# Patient Record
Sex: MALE | Employment: UNEMPLOYED | URBAN - METROPOLITAN AREA
[De-identification: names, ages, dates, MRNs, and addresses within clinical notes are randomized per-mention and may not be internally consistent; named-entity substitution may affect disease eponyms.]

---

## 2018-08-17 ENCOUNTER — TELEPHONE (OUTPATIENT)
Dept: INFECTIOUS DISEASES | Age: 53
End: 2018-08-17

## 2018-08-17 NOTE — TELEPHONE ENCOUNTER
Eli Angelo MD     8/17/2018 11:24 AM   Dr Jesus Mcpherson, I received a fax from [facility] on Giovanna Dakins ([birth year]) reading: End of treatment 8/18/18. Patient will be done with cefepime and daptomycin on 8/18. Is SN able to remove patient picc line? Dr Anne-Marie White recommendations on dictation from 7/20/18 in the hospital read: Wound culture of the right foot VRE  Wound culture positive for staph aureus and gram-negative prasanth  Continue daptomycin and cefepime until August 23  Check WBC, platelets, creatinine, LFTs, CK weekly while on antibiotic  Follow-up ID in 2 weeks. Patient scheduled to see you in the office on 9/11/18. Please advise. Thank you, Naila  Read 8/17/2018 12:01 PM     8/17/2018 12:02 PM   Yes pull picc after end date    *I spoke with Callie Strickland at [facility] and informed her of Dr Randie Gilford order. *

## 2018-09-07 PROBLEM — M00.9 SEPTIC ARTHRITIS (HCC): Status: ACTIVE | Noted: 2017-08-16

## 2018-09-07 PROBLEM — R13.10 DYSPHAGIA: Status: ACTIVE | Noted: 2018-03-02

## 2018-09-07 PROBLEM — R41.82 ALTERED MENTAL STATUS: Status: ACTIVE | Noted: 2017-08-24

## 2018-09-07 PROBLEM — E11.10 DKA (DIABETIC KETOACIDOSES): Status: ACTIVE | Noted: 2017-07-31

## 2018-09-07 PROBLEM — M79.89 HAND SWELLING: Status: ACTIVE | Noted: 2017-08-01

## 2018-09-07 PROBLEM — M86.172 ACUTE OSTEOMYELITIS OF TOE OF LEFT FOOT (HCC): Status: ACTIVE | Noted: 2017-08-08

## 2018-09-07 PROBLEM — L08.9 FOOT INFECTION: Status: ACTIVE | Noted: 2018-03-01

## 2018-09-07 PROBLEM — M24.571 EQUINUS CONTRACTURE OF RIGHT ANKLE: Status: ACTIVE | Noted: 2018-07-09

## 2018-09-07 PROBLEM — E66.01 SEVERE OBESITY (BMI 35.0-39.9): Status: ACTIVE | Noted: 2017-08-01

## 2018-09-07 PROBLEM — M00.232: Status: ACTIVE | Noted: 2017-08-08

## 2018-09-07 PROBLEM — M86.9 OSTEOMYELITIS OF FOOT (HCC): Status: ACTIVE | Noted: 2018-03-01

## 2018-09-07 PROBLEM — L97.529 SKIN ULCERS OF FOOT, BILATERAL (HCC): Status: ACTIVE | Noted: 2017-08-01

## 2018-09-07 PROBLEM — M14.671 CHARCOT'S JOINT OF RIGHT FOOT: Status: ACTIVE | Noted: 2018-03-01

## 2018-09-07 PROBLEM — L03.119 CELLULITIS AND ABSCESS OF FOOT: Status: ACTIVE | Noted: 2018-03-07

## 2018-09-07 PROBLEM — T84.84XA PAINFUL ORTHOPAEDIC HARDWARE (HCC): Status: ACTIVE | Noted: 2018-09-07

## 2018-09-07 PROBLEM — N50.89 SCROTUM SWELLING: Status: ACTIVE | Noted: 2018-09-06

## 2018-09-07 PROBLEM — N17.9 AKI (ACUTE KIDNEY INJURY) (HCC): Status: ACTIVE | Noted: 2017-08-01

## 2018-09-07 PROBLEM — L02.619 CELLULITIS AND ABSCESS OF FOOT: Status: ACTIVE | Noted: 2018-03-07

## 2018-09-07 PROBLEM — L97.519 SKIN ULCERS OF FOOT, BILATERAL (HCC): Status: ACTIVE | Noted: 2017-08-01

## 2018-09-07 PROBLEM — M00.9 SEPTIC ARTHRITIS OF WRIST, LEFT (HCC): Status: ACTIVE | Noted: 2017-08-02

## 2018-09-07 PROBLEM — L03.115 CELLULITIS OF RIGHT FOOT: Status: ACTIVE | Noted: 2018-07-09

## 2018-09-07 PROBLEM — D72.9 NEUTROPHILIC LEUKOCYTOSIS: Status: ACTIVE | Noted: 2017-08-08

## 2018-09-07 PROBLEM — T38.0X5A STEROID-INDUCED HYPERGLYCEMIA: Status: ACTIVE | Noted: 2018-03-11

## 2018-09-07 PROBLEM — A49.02 MRSA INFECTION: Status: ACTIVE | Noted: 2017-08-08

## 2018-09-07 PROBLEM — D57.3 SICKLE-CELL TRAIT (HCC): Status: ACTIVE | Noted: 2018-09-07

## 2018-09-07 PROBLEM — R73.9 STEROID-INDUCED HYPERGLYCEMIA: Status: ACTIVE | Noted: 2018-03-11

## 2018-09-07 PROBLEM — D72.829 LEUKOCYTOSIS: Status: ACTIVE | Noted: 2017-08-01

## 2018-10-06 ENCOUNTER — APPOINTMENT (OUTPATIENT)
Dept: CT IMAGING | Age: 53
End: 2018-10-06
Payer: COMMERCIAL

## 2018-10-06 ENCOUNTER — HOSPITAL ENCOUNTER (EMERGENCY)
Age: 53
Discharge: HOME | End: 2018-10-07
Attending: EMERGENCY MEDICINE
Payer: COMMERCIAL

## 2018-10-06 DIAGNOSIS — R10.13 ABDOMINAL PAIN, EPIGASTRIC: Primary | ICD-10-CM

## 2018-10-06 LAB
ABSOLUTE EOS #: 0 K/UL (ref 0–0.4)
ABSOLUTE IMMATURE GRANULOCYTE: ABNORMAL K/UL (ref 0–0.3)
ABSOLUTE LYMPH #: 0.8 K/UL (ref 1–4.8)
ABSOLUTE MONO #: 0.6 K/UL (ref 0.2–0.8)
ALBUMIN SERPL-MCNC: 3.4 G/DL (ref 3.5–5.2)
ALBUMIN/GLOBULIN RATIO: ABNORMAL (ref 1–2.5)
ALP BLD-CCNC: 106 U/L (ref 40–129)
ALT SERPL-CCNC: 13 U/L (ref 5–41)
AMYLASE: 66 U/L (ref 28–100)
ANION GAP SERPL CALCULATED.3IONS-SCNC: 21 MMOL/L (ref 9–17)
AST SERPL-CCNC: 36 U/L
BASOPHILS # BLD: 0 % (ref 0–2)
BASOPHILS ABSOLUTE: 0 K/UL (ref 0–0.2)
BILIRUB SERPL-MCNC: 1.19 MG/DL (ref 0.3–1.2)
BILIRUBIN DIRECT: 0.28 MG/DL
BILIRUBIN, INDIRECT: 0.91 MG/DL (ref 0–1)
BUN BLDV-MCNC: 24 MG/DL (ref 6–20)
BUN/CREAT BLD: 19 (ref 9–20)
CALCIUM SERPL-MCNC: 9.8 MG/DL (ref 8.6–10.4)
CHLORIDE BLD-SCNC: 92 MMOL/L (ref 98–107)
CO2: 24 MMOL/L (ref 20–31)
CREAT SERPL-MCNC: 1.29 MG/DL (ref 0.7–1.2)
DIFFERENTIAL TYPE: ABNORMAL
EOSINOPHILS RELATIVE PERCENT: 0 % (ref 1–4)
GFR AFRICAN AMERICAN: >60 ML/MIN
GFR NON-AFRICAN AMERICAN: 58 ML/MIN
GFR SERPL CREATININE-BSD FRML MDRD: ABNORMAL ML/MIN/{1.73_M2}
GFR SERPL CREATININE-BSD FRML MDRD: ABNORMAL ML/MIN/{1.73_M2}
GLOBULIN: ABNORMAL G/DL (ref 1.5–3.8)
GLUCOSE BLD-MCNC: 453 MG/DL (ref 70–99)
HCT VFR BLD CALC: 37.4 % (ref 41–53)
HEMOGLOBIN: 12.1 G/DL (ref 13.5–17.5)
IMMATURE GRANULOCYTES: ABNORMAL %
LIPASE: 10 U/L (ref 13–60)
LYMPHOCYTES # BLD: 11 % (ref 24–44)
MCH RBC QN AUTO: 25.6 PG (ref 26–34)
MCHC RBC AUTO-ENTMCNC: 32.3 G/DL (ref 31–37)
MCV RBC AUTO: 79.1 FL (ref 80–100)
MONOCYTES # BLD: 7 % (ref 1–7)
NRBC AUTOMATED: ABNORMAL PER 100 WBC
PDW BLD-RTO: 17.8 % (ref 11.5–14.5)
PLATELET # BLD: 326 K/UL (ref 130–400)
PLATELET ESTIMATE: ABNORMAL
PMV BLD AUTO: 8.3 FL (ref 6–12)
POTASSIUM SERPL-SCNC: 4.7 MMOL/L (ref 3.7–5.3)
RBC # BLD: 4.72 M/UL (ref 4.5–5.9)
RBC # BLD: ABNORMAL 10*6/UL
SEG NEUTROPHILS: 82 % (ref 36–66)
SEGMENTED NEUTROPHILS ABSOLUTE COUNT: 6.4 K/UL (ref 1.8–7.7)
SODIUM BLD-SCNC: 137 MMOL/L (ref 135–144)
TOTAL PROTEIN: 7.7 G/DL (ref 6.4–8.3)
WBC # BLD: 7.9 K/UL (ref 3.5–11)
WBC # BLD: ABNORMAL 10*3/UL

## 2018-10-06 PROCEDURE — 6360000002 HC RX W HCPCS: Performed by: NURSE PRACTITIONER

## 2018-10-06 PROCEDURE — 83690 ASSAY OF LIPASE: CPT

## 2018-10-06 PROCEDURE — 80076 HEPATIC FUNCTION PANEL: CPT

## 2018-10-06 PROCEDURE — 85025 COMPLETE CBC W/AUTO DIFF WBC: CPT

## 2018-10-06 PROCEDURE — 6370000000 HC RX 637 (ALT 250 FOR IP): Performed by: NURSE PRACTITIONER

## 2018-10-06 PROCEDURE — 99284 EMERGENCY DEPT VISIT MOD MDM: CPT

## 2018-10-06 PROCEDURE — 2580000003 HC RX 258: Performed by: NURSE PRACTITIONER

## 2018-10-06 PROCEDURE — 96375 TX/PRO/DX INJ NEW DRUG ADDON: CPT

## 2018-10-06 PROCEDURE — 74176 CT ABD & PELVIS W/O CONTRAST: CPT

## 2018-10-06 PROCEDURE — 82150 ASSAY OF AMYLASE: CPT

## 2018-10-06 PROCEDURE — 80048 BASIC METABOLIC PNL TOTAL CA: CPT

## 2018-10-06 PROCEDURE — 81003 URINALYSIS AUTO W/O SCOPE: CPT

## 2018-10-06 PROCEDURE — 96374 THER/PROPH/DIAG INJ IV PUSH: CPT

## 2018-10-06 RX ORDER — MORPHINE SULFATE 4 MG/ML
4 INJECTION, SOLUTION INTRAMUSCULAR; INTRAVENOUS ONCE
Status: COMPLETED | OUTPATIENT
Start: 2018-10-06 | End: 2018-10-06

## 2018-10-06 RX ORDER — SODIUM CHLORIDE 9 MG/ML
INJECTION, SOLUTION INTRAVENOUS CONTINUOUS
Status: DISCONTINUED | OUTPATIENT
Start: 2018-10-06 | End: 2018-10-07 | Stop reason: HOSPADM

## 2018-10-06 RX ORDER — CARVEDILOL 25 MG/1
25 TABLET ORAL 2 TIMES DAILY WITH MEALS
COMMUNITY

## 2018-10-06 RX ORDER — ATORVASTATIN CALCIUM 10 MG/1
TABLET, FILM COATED ORAL DAILY
Status: ON HOLD | COMMUNITY
End: 2018-10-24 | Stop reason: HOSPADM

## 2018-10-06 RX ORDER — INSULIN GLARGINE 100 [IU]/ML
40 INJECTION, SOLUTION SUBCUTANEOUS 2 TIMES DAILY
Status: ON HOLD | COMMUNITY
End: 2018-10-31 | Stop reason: HOSPADM

## 2018-10-06 RX ORDER — PANTOPRAZOLE SODIUM 20 MG/1
20 TABLET, DELAYED RELEASE ORAL DAILY
COMMUNITY

## 2018-10-06 RX ORDER — AMLODIPINE BESYLATE 10 MG/1
10 TABLET ORAL DAILY
Status: ON HOLD | COMMUNITY
End: 2018-10-24 | Stop reason: HOSPADM

## 2018-10-06 RX ORDER — ONDANSETRON 2 MG/ML
4 INJECTION INTRAMUSCULAR; INTRAVENOUS ONCE
Status: COMPLETED | OUTPATIENT
Start: 2018-10-06 | End: 2018-10-06

## 2018-10-06 RX ADMIN — INSULIN HUMAN 7 UNITS: 100 INJECTION, SOLUTION PARENTERAL at 23:24

## 2018-10-06 RX ADMIN — SODIUM CHLORIDE: 9 INJECTION, SOLUTION INTRAVENOUS at 22:17

## 2018-10-06 RX ADMIN — MORPHINE SULFATE 4 MG: 4 INJECTION INTRAVENOUS at 22:17

## 2018-10-06 RX ADMIN — ONDANSETRON 4 MG: 2 INJECTION INTRAMUSCULAR; INTRAVENOUS at 22:17

## 2018-10-06 ASSESSMENT — PAIN SCALES - GENERAL
PAINLEVEL_OUTOF10: 10
PAINLEVEL_OUTOF10: 7
PAINLEVEL_OUTOF10: 10

## 2018-10-07 VITALS
DIASTOLIC BLOOD PRESSURE: 95 MMHG | BODY MASS INDEX: 38.36 KG/M2 | HEIGHT: 76 IN | SYSTOLIC BLOOD PRESSURE: 148 MMHG | WEIGHT: 315 LBS | TEMPERATURE: 98.1 F | HEART RATE: 101 BPM | RESPIRATION RATE: 16 BRPM | OXYGEN SATURATION: 97 %

## 2018-10-07 LAB — GLUCOSE BLD-MCNC: 384 MG/DL (ref 75–110)

## 2018-10-07 PROCEDURE — 82947 ASSAY GLUCOSE BLOOD QUANT: CPT

## 2018-10-07 PROCEDURE — 96375 TX/PRO/DX INJ NEW DRUG ADDON: CPT

## 2018-10-07 PROCEDURE — 6360000002 HC RX W HCPCS: Performed by: NURSE PRACTITIONER

## 2018-10-07 RX ORDER — ONDANSETRON 4 MG/1
4 TABLET, ORALLY DISINTEGRATING ORAL EVERY 8 HOURS PRN
Qty: 20 TABLET | Refills: 0 | Status: SHIPPED | OUTPATIENT
Start: 2018-10-07

## 2018-10-07 RX ORDER — ONDANSETRON 2 MG/ML
4 INJECTION INTRAMUSCULAR; INTRAVENOUS ONCE
Status: COMPLETED | OUTPATIENT
Start: 2018-10-07 | End: 2018-10-07

## 2018-10-07 RX ORDER — SUCRALFATE 1 G/1
1 TABLET ORAL 4 TIMES DAILY
Qty: 30 TABLET | Refills: 0 | Status: ON HOLD | OUTPATIENT
Start: 2018-10-07 | End: 2018-10-31 | Stop reason: HOSPADM

## 2018-10-07 RX ADMIN — ONDANSETRON 4 MG: 2 INJECTION INTRAMUSCULAR; INTRAVENOUS at 00:50

## 2018-10-07 ASSESSMENT — ENCOUNTER SYMPTOMS
RHINORRHEA: 0
SORE THROAT: 0
COLOR CHANGE: 0
WHEEZING: 0
COUGH: 0
SINUS PRESSURE: 0
CONSTIPATION: 0
DIARRHEA: 0
SHORTNESS OF BREATH: 0
ABDOMINAL PAIN: 1
NAUSEA: 1
VOMITING: 1

## 2018-10-07 NOTE — ED PROVIDER NOTES
[address] [place] [address] ED  eMERGENCY dEPARTMENT eNCOUnter      Pt Name: Noa Renae  MRN: [id number]  [place] 1965  Date of evaluation: 10/6/2018  Provider: [address] [place] [address] NP, OLIVER Mcmullen 6663       Chief Complaint   Patient presents with    Abdominal Pain     hx of pancreatitis         HISTORY OF PRESENT ILLNESS  (Location/Symptom, Timing/Onset, Context/Setting, Quality, Duration, Modifying Factors, Severity.)   Noa Renae is a 45 y.o. male who presents to the emergency department By private vehicle for evaluation of abdominal pain. Patient states he has a history of epigastric abdominal pain. He denies any associated fevers or chills. He states he's also had nausea and vomiting that started yesterday. Patient states that this feels similar. He denies any diarrhea or constipation. Nursing Notes were reviewed.     ALLERGIES     Nsaids    CURRENT MEDICATIONS       Discharge Medication List as of 10/7/2018 12:33 AM      CONTINUE these medications which have NOT CHANGED    Details   carvedilol (COREG) 25 MG tablet Take 25 mg by mouth 2 times daily (with meals)Historical Med      amLODIPine (NORVASC) 10 MG tablet Take 10 mg by mouth dailyHistorical Med      pantoprazole (PROTONIX) 20 MG tablet Take 20 mg by mouth dailyHistorical Med      atorvastatin (LIPITOR) 10 MG tablet Take by mouth dailyHistorical Med      insulin glargine (LANTUS) 100 UNIT/ML injection vial Inject into the skin nightlyHistorical Med      Insulin Zinc Human (HUMULIN L SC) Inject into the skinHistorical Med      aspirin 81 MG tablet Take 81 mg by mouth dailyHistorical Med             PAST MEDICAL HISTORY         Diagnosis Date    Dysphagia 3/2/2018    Overview:  Added automatically from request for surgery [id number]  Overview:  Overview:  Added automatically from request for surgery [id number]    Equinus contracture of right ankle 7/9/2018    Foot infection 3/1/2018    Hand swelling 8/1/2017    HTN (hypertension) 12/27/2012    Hyperglycemia without ketosis 12/27/2012    Hyperkalemia 7/20/2012    Leukocytosis 8/1/2017    Mixed hyperlipidemia 12/27/2012    MRSA infection 8/8/2017    Nausea & vomiting 35/34/5984    Neutrophilic leukocytosis 8/5/6123    Osteomyelitis ([address] 7/20/2012    Osteomyelitis of foot ([address] 3/1/2018    Overview:  Added automatically from request for surgery [id number]  Overview:  Overview:  Added automatically from request for surgery [id number]    Painful orthopaedic hardware ([address] 9/7/2018    Scrotum swelling 9/6/2018    Septic arthritis ([address] 8/16/2017    Overview:  Added automatically from request for surgery [id number]  Overview:  Overview:  Added automatically from request for surgery [id number]    Septic arthritis of wrist, left ([address] 8/2/2017    Overview:  Added automatically from request for surgery [id number]  Overview:  Overview:  Added automatically from request for surgery [id number]    Sickle-cell trait ([address] 9/7/2018    Skin ulcers of foot, bilateral ([address] 8/1/2017    Steroid-induced hyperglycemia 3/11/2018    Streptococcal arthritis of left wrist ([address] 8/8/2017    Type 2 diabetes mellitus, uncontrolled ([address] 7/20/2012    Uncontrolled type 2 diabetes mellitus with diabetic nephropathy, with long-term current use of insulin ([address] 3/9/2018    Uncontrolled type 2 diabetes mellitus with retinopathy, with long-term current use of insulin ([address] 3/9/2018       SURGICAL HISTORY           Procedure Laterality Date    AMPUTATION      left foot, 3rd toe    AMPUTATION      right foot, 2nd toe         FAMILY HISTORY     History reviewed. No pertinent family history. No family status information on file. SOCIAL HISTORY      reports that he has quit smoking. He has never used smokeless tobacco. He reports that he does not drink alcohol or use drugs.     REVIEW OF SYSTEMS    (2-9 systems for level 4, 10 or more for level 5)     Review of Systems   Constitutional: Negative for chills, fever and injection 4 mg (4 mg Intravenous Given 10/6/18 3455)   insulin regular (HUMULIN R;NOVOLIN R) injection 7 Units (7 Units Intravenous Given 10/6/18 9776)   ondansetron (ZOFRAN) injection 4 mg (4 mg Intravenous Given 10/7/18 0050)     FINAL IMPRESSION      1.  Abdominal pain, epigastric          DISPOSITION/PLAN   DISPOSITION Decision To Discharge 10/07/2018 12:32:31 AM      PATIENT REFERRED TO:   Monique Traore MD  [address] [facility]  [address] [facility]  [address]  [phone]    Call in 2 days      [facility] ED  [address] [facility]  [phone]    If symptoms worsen      DISCHARGE MEDICATIONS:     Discharge Medication List as of 10/7/2018 12:33 AM      START taking these medications    Details   sucralfate (CARAFATE) 1 GM tablet Take 1 tablet by mouth 4 times daily, Disp-30 tablet, R-0Print      ondansetron (ZOFRAN ODT) 4 MG disintegrating tablet Take 1 tablet by mouth every 8 hours as needed for Nausea, Disp-20 tablet, R-0Print                 (Please note that portions of this note were completed with a voice recognition program.  Efforts were made to edit the dictations but occasionally words are mis-transcribed.)    Destiny Rico NP, APRN - CNP  Certified Nurse Practitioner            OLIVER Madrid CNP  10/07/18 5631

## 2018-10-07 NOTE — ED NOTES
Pt presents with c/o abdominal pain. Pt states he has history of pancreatitis. Also experiencing nausea and vomiting. S/sx started today. Rates pain 10/10.      Lesley Habermann, RN  10/06/18 0461

## 2018-10-12 ENCOUNTER — OFFICE VISIT (OUTPATIENT)
Dept: INTERNAL MEDICINE | Age: 53
End: 2018-10-12
Payer: COMMERCIAL

## 2018-10-12 VITALS — DIASTOLIC BLOOD PRESSURE: 90 MMHG | OXYGEN SATURATION: 91 % | HEART RATE: 91 BPM | SYSTOLIC BLOOD PRESSURE: 145 MMHG

## 2018-10-12 DIAGNOSIS — D86.9 SARCOIDOSIS: ICD-10-CM

## 2018-10-12 DIAGNOSIS — Z23 NEED FOR PNEUMOCOCCAL VACCINATION: ICD-10-CM

## 2018-10-12 DIAGNOSIS — Z13.220 SCREENING FOR HYPERLIPIDEMIA: ICD-10-CM

## 2018-10-12 DIAGNOSIS — Z23 FLU VACCINE NEED: ICD-10-CM

## 2018-10-12 DIAGNOSIS — Z12.11 SCREENING FOR COLON CANCER: ICD-10-CM

## 2018-10-12 DIAGNOSIS — M06.9 RHEUMATOID ARTHRITIS, INVOLVING UNSPECIFIED SITE, UNSPECIFIED RHEUMATOID FACTOR PRESENCE: ICD-10-CM

## 2018-10-12 DIAGNOSIS — Z79.4 TYPE 2 DIABETES MELLITUS WITH DIABETIC PERIPHERAL ANGIOPATHY WITHOUT GANGRENE, WITH LONG-TERM CURRENT USE OF INSULIN (HCC): ICD-10-CM

## 2018-10-12 DIAGNOSIS — E11.51 TYPE 2 DIABETES MELLITUS WITH DIABETIC PERIPHERAL ANGIOPATHY WITHOUT GANGRENE, WITH LONG-TERM CURRENT USE OF INSULIN (HCC): ICD-10-CM

## 2018-10-12 LAB — HBA1C MFR BLD: 11.1 %

## 2018-10-12 PROCEDURE — 3046F HEMOGLOBIN A1C LEVEL >9.0%: CPT | Performed by: INTERNAL MEDICINE

## 2018-10-12 PROCEDURE — G8482 FLU IMMUNIZE ORDER/ADMIN: HCPCS | Performed by: INTERNAL MEDICINE

## 2018-10-12 PROCEDURE — 3017F COLORECTAL CA SCREEN DOC REV: CPT | Performed by: INTERNAL MEDICINE

## 2018-10-12 PROCEDURE — G8427 DOCREV CUR MEDS BY ELIG CLIN: HCPCS | Performed by: INTERNAL MEDICINE

## 2018-10-12 PROCEDURE — 90472 IMMUNIZATION ADMIN EACH ADD: CPT | Performed by: INTERNAL MEDICINE

## 2018-10-12 PROCEDURE — 90670 PCV13 VACCINE IM: CPT | Performed by: INTERNAL MEDICINE

## 2018-10-12 PROCEDURE — 2022F DILAT RTA XM EVC RTNOPTHY: CPT | Performed by: INTERNAL MEDICINE

## 2018-10-12 PROCEDURE — 90471 IMMUNIZATION ADMIN: CPT | Performed by: INTERNAL MEDICINE

## 2018-10-12 PROCEDURE — 1036F TOBACCO NON-USER: CPT | Performed by: INTERNAL MEDICINE

## 2018-10-12 PROCEDURE — 83036 HEMOGLOBIN GLYCOSYLATED A1C: CPT | Performed by: INTERNAL MEDICINE

## 2018-10-12 PROCEDURE — 99203 OFFICE O/P NEW LOW 30 MIN: CPT | Performed by: INTERNAL MEDICINE

## 2018-10-12 PROCEDURE — G8417 CALC BMI ABV UP PARAM F/U: HCPCS | Performed by: INTERNAL MEDICINE

## 2018-10-12 PROCEDURE — 90688 IIV4 VACCINE SPLT 0.5 ML IM: CPT | Performed by: INTERNAL MEDICINE

## 2018-10-12 RX ORDER — AMMONIUM LACTATE 12 G/100G
CREAM TOPICAL
COMMUNITY

## 2018-10-12 RX ORDER — DOCUSATE SODIUM 100 MG/1
100 CAPSULE, LIQUID FILLED ORAL 2 TIMES DAILY
COMMUNITY

## 2018-10-12 RX ORDER — FERROUS SULFATE 325(65) MG
325 TABLET ORAL 2 TIMES DAILY WITH MEALS
COMMUNITY

## 2018-10-12 RX ORDER — PREGABALIN 50 MG/1
50 CAPSULE ORAL 2 TIMES DAILY
COMMUNITY

## 2018-10-12 RX ORDER — HYDROCHLOROTHIAZIDE 25 MG/1
25 TABLET ORAL
Status: ON HOLD | COMMUNITY
Start: 2018-09-20 | End: 2018-10-24 | Stop reason: HOSPADM

## 2018-10-12 ASSESSMENT — ENCOUNTER SYMPTOMS
RESPIRATORY NEGATIVE: 1
NAUSEA: 1
VOMITING: 1
EYES NEGATIVE: 1
ALLERGIC/IMMUNOLOGIC NEGATIVE: 1

## 2018-10-12 ASSESSMENT — PATIENT HEALTH QUESTIONNAIRE - PHQ9
SUM OF ALL RESPONSES TO PHQ QUESTIONS 1-9: 0
SUM OF ALL RESPONSES TO PHQ QUESTIONS 1-9: 0
SUM OF ALL RESPONSES TO PHQ9 QUESTIONS 1 & 2: 0
1. LITTLE INTEREST OR PLEASURE IN DOING THINGS: 0
2. FEELING DOWN, DEPRESSED OR HOPELESS: 0

## 2018-10-12 NOTE — PROGRESS NOTES
of insulin ([address]   . Past Medical History:   Diagnosis Date    Dysphagia 3/2/2018    Overview:  Added automatically from request for surgery [id number]  Overview:  Overview:  Added automatically from request for surgery [id number]    Equinus contracture of right ankle 7/9/2018    Foot infection 3/1/2018    Hand swelling 8/1/2017    HTN (hypertension) 12/27/2012    Hyperglycemia without ketosis 12/27/2012    Hyperkalemia 7/20/2012    Leukocytosis 8/1/2017    Mixed hyperlipidemia 12/27/2012    MRSA infection 8/8/2017    Nausea & vomiting 17/83/0650    Neutrophilic leukocytosis 9/2/9910    Osteomyelitis ([address] 7/20/2012    Osteomyelitis of foot ([address] 3/1/2018    Overview:  Added automatically from request for surgery [id number]  Overview:  Overview:  Added automatically from request for surgery [id number]    Painful orthopaedic hardware ([address] 9/7/2018    Scrotum swelling 9/6/2018    Septic arthritis ([address] 8/16/2017    Overview:  Added automatically from request for surgery [id number]  Overview:  Overview:  Added automatically from request for surgery [id number]    Septic arthritis of wrist, left ([address] 8/2/2017    Overview:  Added automatically from request for surgery [id number]  Overview:  Overview:  Added automatically from request for surgery [id number]    Sickle-cell trait ([address] 9/7/2018    Skin ulcers of foot, bilateral ([address] 8/1/2017    Steroid-induced hyperglycemia 3/11/2018    Streptococcal arthritis of left wrist ([address] 8/8/2017    Type 2 diabetes mellitus, uncontrolled ([address] 7/20/2012    Uncontrolled type 2 diabetes mellitus with diabetic nephropathy, with long-term current use of insulin ([address] 3/9/2018    Uncontrolled type 2 diabetes mellitus with retinopathy, with long-term current use of insulin ([address] 3/9/2018       Past Surgical History:   Procedure Laterality Date    AMPUTATION      left foot, 3rd toe    AMPUTATION      right foot, 2nd toe       No family history on file.     Social History   Substance Use test (Diabetic or Prediabetic)  11/20/1973    Diabetic retinal exam  11/20/1973    Lipid screen  11/20/1973    HIV screen  11/20/1978    Diabetic microalbuminuria test  11/20/1981    DTaP/Tdap/Td vaccine (1 - Tdap) 11/20/1982    Colon cancer screen colonoscopy  11/20/2013    Pneumococcal highest risk (2 of 3 - PCV13) 12/29/2013    Flu vaccine (1) 09/01/2018    Potassium monitoring  10/06/2019    Creatinine monitoring  10/06/2019       Controlled Substances Monitoring:      HPI:     Diabetes   He presents for his initial diabetic visit. He has type 2 diabetes mellitus. His disease course has been stable. Symptoms are stable. Diabetic complications include nephropathy, peripheral neuropathy and PVD. Risk factors for coronary artery disease include diabetes mellitus, dyslipidemia, obesity and hypertension. Current diabetic treatment includes insulin injections. He is compliant with treatment most of the time. He is following a generally healthy diet. His home blood glucose trend is fluctuating minimally (A1C is 11.1). (Recently he says his BS run between 150 and 450.) An ACE inhibitor/angiotensin II receptor blocker is contraindicated. Multiple hospitalizations. Mainly for DM foot problems: Charcot foot, ulcers. Right foot. Has a podiatrist.    Has DM nephropathy not seeing a nephrologist.    He states he has been dx with both sarcoidosis and rheumatoid arthritis. Wants rheumatology referral.     ROS:     Review of Systems   Constitutional: Positive for appetite change. HENT: Negative. Eyes: Negative. Respiratory: Negative. Cardiovascular: Positive for leg swelling. Gastrointestinal: Positive for nausea and vomiting. Endocrine: Negative. Genitourinary: Negative. Musculoskeletal: Negative. Skin: Negative. Allergic/Immunologic: Negative. Neurological: Negative. Hematological: Negative. Psychiatric/Behavioral: Negative.     All other systems reviewed and are negative. Objective:     Physical Exam   Constitutional: He is oriented to person, place, and time. He appears well-developed and well-nourished. HENT:   Head: Normocephalic and atraumatic. Neck: Neck supple. Cardiovascular: Normal rate and regular rhythm. Pulmonary/Chest: Effort normal and breath sounds normal.   Abdominal: Soft. Musculoskeletal: He exhibits edema. Neurological: He is alert and oriented to person, place, and time. Skin: Skin is warm and dry. Right foot with decreased sensation and pulses. Has healing ulcerations. Psychiatric: He has a normal mood and affect. His behavior is normal.   Vitals reviewed. Visit Information    Have you changed or started any medications since your last visit including any over-the-counter medicines, vitamins, or herbal medicines? no   Are you having any side effects from any of your medications? -  no  Have you stopped taking any of your medications? Is so, why? -  no    Have you seen any other physician or provider since your last visit? Yes - Records Obtained  Have you had any other diagnostic tests since your last visit? Yes - Records Obtained  Have you been seen in the emergency room and/or had an admission to a hospital since we last saw you? Yes - Records Obtained  Have you had your routine dental cleaning in the past 6 months? no    Have you activated your [employer] account? If not, what are your barriers?  Yes     No care team member to display    Medical History Review  Past Medical, Family, and Social History reviewed and does contribute to the patient presenting condition    Health Maintenance   Topic Date Due    Hepatitis C screen  1965    Diabetic foot exam  11/20/1973    A1C test (Diabetic or Prediabetic)  11/20/1973    Diabetic retinal exam  11/20/1973    Lipid screen  11/20/1973    HIV screen  11/20/1978    Diabetic microalbuminuria test  11/20/1981    DTaP/Tdap/Td vaccine (1 - Tdap) 11/20/1982    Colon cancer

## 2018-10-14 ENCOUNTER — HOSPITAL ENCOUNTER (INPATIENT)
Age: 53
LOS: 9 days | Discharge: HOME | DRG: 174 | End: 2018-10-24
Attending: EMERGENCY MEDICINE | Admitting: INTERNAL MEDICINE
Payer: COMMERCIAL

## 2018-10-14 DIAGNOSIS — I50.42 CHRONIC COMBINED SYSTOLIC (CONGESTIVE) AND DIASTOLIC (CONGESTIVE) HEART FAILURE (HCC): ICD-10-CM

## 2018-10-14 DIAGNOSIS — I21.4 NSTEMI (NON-ST ELEVATED MYOCARDIAL INFARCTION) (HCC): Primary | ICD-10-CM

## 2018-10-14 DIAGNOSIS — I47.1 SVT (SUPRAVENTRICULAR TACHYCARDIA) (HCC): ICD-10-CM

## 2018-10-14 DIAGNOSIS — N17.9 AKI (ACUTE KIDNEY INJURY) (HCC): ICD-10-CM

## 2018-10-14 PROCEDURE — 2580000003 HC RX 258: Performed by: EMERGENCY MEDICINE

## 2018-10-14 PROCEDURE — 85014 HEMATOCRIT: CPT

## 2018-10-14 PROCEDURE — 85610 PROTHROMBIN TIME: CPT

## 2018-10-14 PROCEDURE — 87205 SMEAR GRAM STAIN: CPT

## 2018-10-14 PROCEDURE — 82435 ASSAY OF BLOOD CHLORIDE: CPT

## 2018-10-14 PROCEDURE — 85730 THROMBOPLASTIN TIME PARTIAL: CPT

## 2018-10-14 PROCEDURE — 85025 COMPLETE CBC W/AUTO DIFF WBC: CPT

## 2018-10-14 PROCEDURE — 93005 ELECTROCARDIOGRAM TRACING: CPT

## 2018-10-14 PROCEDURE — 87149 DNA/RNA DIRECT PROBE: CPT

## 2018-10-14 PROCEDURE — 82947 ASSAY GLUCOSE BLOOD QUANT: CPT

## 2018-10-14 PROCEDURE — 99291 CRITICAL CARE FIRST HOUR: CPT

## 2018-10-14 PROCEDURE — 83690 ASSAY OF LIPASE: CPT

## 2018-10-14 PROCEDURE — 86403 PARTICLE AGGLUT ANTBDY SCRN: CPT

## 2018-10-14 PROCEDURE — 84132 ASSAY OF SERUM POTASSIUM: CPT

## 2018-10-14 PROCEDURE — 84295 ASSAY OF SERUM SODIUM: CPT

## 2018-10-14 PROCEDURE — 87040 BLOOD CULTURE FOR BACTERIA: CPT

## 2018-10-14 PROCEDURE — 82565 ASSAY OF CREATININE: CPT

## 2018-10-14 PROCEDURE — 80053 COMPREHEN METABOLIC PANEL: CPT

## 2018-10-14 PROCEDURE — 82330 ASSAY OF CALCIUM: CPT

## 2018-10-14 PROCEDURE — 82803 BLOOD GASES ANY COMBINATION: CPT

## 2018-10-14 PROCEDURE — 83605 ASSAY OF LACTIC ACID: CPT

## 2018-10-14 RX ORDER — SODIUM CHLORIDE 0.9 % (FLUSH) 0.9 %
10 SYRINGE (ML) INJECTION PRN
Status: DISCONTINUED | OUTPATIENT
Start: 2018-10-14 | End: 2018-10-24 | Stop reason: HOSPADM

## 2018-10-14 RX ORDER — 0.9 % SODIUM CHLORIDE 0.9 %
1000 INTRAVENOUS SOLUTION INTRAVENOUS ONCE
Status: COMPLETED | OUTPATIENT
Start: 2018-10-15 | End: 2018-10-15

## 2018-10-14 RX ORDER — FENTANYL CITRATE 50 UG/ML
25 INJECTION, SOLUTION INTRAMUSCULAR; INTRAVENOUS ONCE
Status: DISCONTINUED | OUTPATIENT
Start: 2018-10-15 | End: 2018-10-14

## 2018-10-14 RX ORDER — FENTANYL CITRATE 50 UG/ML
50 INJECTION, SOLUTION INTRAMUSCULAR; INTRAVENOUS ONCE
Status: COMPLETED | OUTPATIENT
Start: 2018-10-15 | End: 2018-10-15

## 2018-10-14 RX ORDER — SODIUM CHLORIDE 0.9 % (FLUSH) 0.9 %
10 SYRINGE (ML) INJECTION EVERY 12 HOURS SCHEDULED
Status: DISCONTINUED | OUTPATIENT
Start: 2018-10-15 | End: 2018-10-24 | Stop reason: HOSPADM

## 2018-10-14 RX ORDER — ONDANSETRON 2 MG/ML
4 INJECTION INTRAMUSCULAR; INTRAVENOUS ONCE
Status: COMPLETED | OUTPATIENT
Start: 2018-10-15 | End: 2018-10-15

## 2018-10-14 RX ADMIN — SODIUM CHLORIDE 1000 ML: 9 INJECTION, SOLUTION INTRAVENOUS at 23:46

## 2018-10-14 RX ADMIN — SODIUM CHLORIDE 1000 ML: 9 INJECTION, SOLUTION INTRAVENOUS at 23:56

## 2018-10-15 ENCOUNTER — APPOINTMENT (OUTPATIENT)
Dept: CT IMAGING | Age: 53
DRG: 174 | End: 2018-10-15
Payer: COMMERCIAL

## 2018-10-15 ENCOUNTER — APPOINTMENT (OUTPATIENT)
Dept: NUCLEAR MEDICINE | Age: 53
DRG: 174 | End: 2018-10-15
Payer: COMMERCIAL

## 2018-10-15 ENCOUNTER — APPOINTMENT (OUTPATIENT)
Dept: GENERAL RADIOLOGY | Age: 53
DRG: 174 | End: 2018-10-15
Payer: COMMERCIAL

## 2018-10-15 PROBLEM — R77.8 ELEVATED TROPONIN: Status: ACTIVE | Noted: 2018-10-15

## 2018-10-15 PROBLEM — I24.9 ACUTE CORONARY SYNDROME (HCC): Status: ACTIVE | Noted: 2018-10-15

## 2018-10-15 PROBLEM — I47.1 SVT (SUPRAVENTRICULAR TACHYCARDIA) (HCC): Status: ACTIVE | Noted: 2018-10-15

## 2018-10-15 LAB
-: NORMAL
ABSOLUTE EOS #: 0.43 K/UL (ref 0–0.44)
ABSOLUTE IMMATURE GRANULOCYTE: 0.13 K/UL (ref 0–0.3)
ABSOLUTE LYMPH #: 2.15 K/UL (ref 1.1–3.7)
ABSOLUTE MONO #: 1.34 K/UL (ref 0.1–1.2)
ALBUMIN SERPL-MCNC: 2.9 G/DL (ref 3.5–5.2)
ALBUMIN/GLOBULIN RATIO: 0.7 (ref 1–2.5)
ALLEN TEST: ABNORMAL
ALP BLD-CCNC: 105 U/L (ref 40–129)
ALT SERPL-CCNC: 14 U/L (ref 5–41)
AMORPHOUS: NORMAL
ANION GAP SERPL CALCULATED.3IONS-SCNC: 13 MMOL/L (ref 9–17)
ANION GAP: 7 MMOL/L (ref 7–16)
AST SERPL-CCNC: 18 U/L
BACTERIA: NORMAL
BASOPHILS # BLD: 0 % (ref 0–2)
BASOPHILS ABSOLUTE: 0.03 K/UL (ref 0–0.2)
BILIRUB SERPL-MCNC: 0.58 MG/DL (ref 0.3–1.2)
BILIRUBIN URINE: NEGATIVE
BNP INTERPRETATION: ABNORMAL
BUN BLDV-MCNC: 42 MG/DL (ref 6–20)
BUN BLDV-MCNC: 42 MG/DL (ref 6–20)
BUN BLDV-MCNC: 44 MG/DL (ref 6–20)
BUN/CREAT BLD: ABNORMAL (ref 9–20)
CALCIUM SERPL-MCNC: 9.2 MG/DL (ref 8.6–10.4)
CASTS UA: NORMAL /LPF (ref 0–8)
CHLORIDE BLD-SCNC: 94 MMOL/L (ref 98–107)
CO2: 30 MMOL/L (ref 20–31)
COLOR: YELLOW
COMMENT UA: ABNORMAL
CREAT SERPL-MCNC: 1.68 MG/DL (ref 0.7–1.2)
CREAT SERPL-MCNC: 1.69 MG/DL (ref 0.7–1.2)
CREAT SERPL-MCNC: 1.97 MG/DL (ref 0.7–1.2)
CREATININE URINE: 68.5 MG/DL (ref 39–259)
CRYSTALS, UA: NORMAL /HPF
DIFFERENTIAL TYPE: ABNORMAL
EOSINOPHILS RELATIVE PERCENT: 4 % (ref 1–4)
EPITHELIAL CELLS UA: NORMAL /HPF (ref 0–5)
FIO2: ABNORMAL
GFR AFRICAN AMERICAN: 43 ML/MIN
GFR AFRICAN AMERICAN: 52 ML/MIN
GFR AFRICAN AMERICAN: 52 ML/MIN
GFR NON-AFRICAN AMERICAN: 34 ML/MIN
GFR NON-AFRICAN AMERICAN: 36 ML/MIN
GFR NON-AFRICAN AMERICAN: 43 ML/MIN
GFR NON-AFRICAN AMERICAN: 43 ML/MIN
GFR SERPL CREATININE-BSD FRML MDRD: 42 ML/MIN
GFR SERPL CREATININE-BSD FRML MDRD: ABNORMAL ML/MIN/{1.73_M2}
GLUCOSE BLD-MCNC: 356 MG/DL (ref 70–99)
GLUCOSE BLD-MCNC: 365 MG/DL (ref 74–100)
GLUCOSE BLD-MCNC: 399 MG/DL (ref 75–110)
GLUCOSE BLD-MCNC: 455 MG/DL (ref 75–110)
GLUCOSE BLD-MCNC: 473 MG/DL (ref 75–110)
GLUCOSE URINE: ABNORMAL
HCO3 VENOUS: 31.8 MMOL/L (ref 22–29)
HCT VFR BLD CALC: 37.5 % (ref 40.7–50.3)
HCT VFR BLD CALC: 42 % (ref 40.7–50.3)
HEMOGLOBIN: 11.7 G/DL (ref 13–17)
HEMOGLOBIN: 13.1 G/DL (ref 13–17)
IMMATURE GRANULOCYTES: 1 %
INR BLD: 1.1
KETONES, URINE: NEGATIVE
LACTIC ACID, SEPSIS WHOLE BLOOD: 3 MMOL/L (ref 0.5–1.9)
LACTIC ACID, SEPSIS: ABNORMAL MMOL/L (ref 0.5–1.9)
LACTIC ACID, WHOLE BLOOD: 2.8 MMOL/L (ref 0.7–2.1)
LEUKOCYTE ESTERASE, URINE: NEGATIVE
LIPASE: 13 U/L (ref 13–60)
LYMPHOCYTES # BLD: 19 % (ref 24–43)
MAGNESIUM: 1.8 MG/DL (ref 1.6–2.6)
MCH RBC QN AUTO: 24.6 PG (ref 25.2–33.5)
MCH RBC QN AUTO: 24.7 PG (ref 25.2–33.5)
MCHC RBC AUTO-ENTMCNC: 31.2 G/DL (ref 28.4–34.8)
MCHC RBC AUTO-ENTMCNC: 31.2 G/DL (ref 28.4–34.8)
MCV RBC AUTO: 78.9 FL (ref 82.6–102.9)
MCV RBC AUTO: 79.3 FL (ref 82.6–102.9)
MODE: ABNORMAL
MONOCYTES # BLD: 12 % (ref 3–12)
MUCUS: NORMAL
NEGATIVE BASE EXCESS, VEN: ABNORMAL (ref 0–2)
NITRITE, URINE: NEGATIVE
NRBC AUTOMATED: 0 PER 100 WBC
NRBC AUTOMATED: 0 PER 100 WBC
O2 DEVICE/FLOW/%: ABNORMAL
O2 SAT, VEN: 86 % (ref 60–85)
OTHER OBSERVATIONS UA: NORMAL
PARTIAL THROMBOPLASTIN TIME: 23 SEC (ref 20.5–30.5)
PARTIAL THROMBOPLASTIN TIME: 23.8 SEC (ref 20.5–30.5)
PARTIAL THROMBOPLASTIN TIME: 42.1 SEC (ref 20.5–30.5)
PATIENT TEMP: ABNORMAL
PCO2, VEN: 40.9 MM HG (ref 41–51)
PDW BLD-RTO: 17.1 % (ref 11.8–14.4)
PDW BLD-RTO: 17.1 % (ref 11.8–14.4)
PH UA: 5 (ref 5–8)
PH VENOUS: 7.5 (ref 7.32–7.43)
PHOSPHORUS: 3.6 MG/DL (ref 2.5–4.5)
PLATELET # BLD: 334 K/UL (ref 138–453)
PLATELET # BLD: 475 K/UL (ref 138–453)
PLATELET ESTIMATE: ABNORMAL
PMV BLD AUTO: 10.7 FL (ref 8.1–13.5)
PMV BLD AUTO: 10.8 FL (ref 8.1–13.5)
PO2, VEN: 47.1 MM HG (ref 30–50)
POC CHLORIDE: 97 MMOL/L (ref 98–107)
POC CREATININE: 2.03 MG/DL (ref 0.51–1.19)
POC HEMATOCRIT: 49 % (ref 41–53)
POC HEMOGLOBIN: 16.7 G/DL (ref 13.5–17.5)
POC IONIZED CALCIUM: 1.06 MMOL/L (ref 1.15–1.33)
POC LACTIC ACID: 2.93 MMOL/L (ref 0.56–1.39)
POC PCO2 TEMP: ABNORMAL MM HG
POC PH TEMP: ABNORMAL
POC PO2 TEMP: ABNORMAL MM HG
POC POTASSIUM: 4 MMOL/L (ref 3.5–4.5)
POC SODIUM: 136 MMOL/L (ref 138–146)
POC TROPONIN I: 3.74 NG/ML (ref 0–0.1)
POC TROPONIN INTERP: ABNORMAL
POSITIVE BASE EXCESS, VEN: 8 (ref 0–3)
POTASSIUM SERPL-SCNC: 4.2 MMOL/L (ref 3.7–5.3)
PRO-BNP: ABNORMAL PG/ML
PROTEIN UA: ABNORMAL
PROTHROMBIN TIME: 11.3 SEC (ref 9–12)
RBC # BLD: 4.73 M/UL (ref 4.21–5.77)
RBC # BLD: 5.32 M/UL (ref 4.21–5.77)
RBC # BLD: ABNORMAL 10*6/UL
RBC UA: NORMAL /HPF (ref 0–4)
RENAL EPITHELIAL, UA: NORMAL /HPF
SAMPLE SITE: ABNORMAL
SEG NEUTROPHILS: 64 % (ref 36–65)
SEGMENTED NEUTROPHILS ABSOLUTE COUNT: 7.18 K/UL (ref 1.5–8.1)
SODIUM BLD-SCNC: 137 MMOL/L (ref 135–144)
SODIUM,UR: 28 MMOL/L
SPECIFIC GRAVITY UA: 1.01 (ref 1–1.03)
TOTAL CO2, VENOUS: 33 MMOL/L (ref 23–30)
TOTAL PROTEIN, URINE: 40 MG/DL
TOTAL PROTEIN: 7.2 G/DL (ref 6.4–8.3)
TRICHOMONAS: NORMAL
TROPONIN INTERP: ABNORMAL
TROPONIN T: 1.91 NG/ML
TROPONIN T: 2.07 NG/ML
TROPONIN T: 2.14 NG/ML
TROPONIN T: 2.45 NG/ML
TSH SERPL DL<=0.05 MIU/L-ACNC: 1.82 MIU/L (ref 0.3–5)
TURBIDITY: CLEAR
URINE HGB: ABNORMAL
URINE TOTAL PROTEIN CREATININE RATIO: 0.58 (ref 0–0.2)
UROBILINOGEN, URINE: NORMAL
WBC # BLD: 11.3 K/UL (ref 3.5–11.3)
WBC # BLD: 9.6 K/UL (ref 3.5–11.3)
WBC # BLD: ABNORMAL 10*3/UL
WBC UA: NORMAL /HPF (ref 0–5)
YEAST: NORMAL

## 2018-10-15 PROCEDURE — 2580000003 HC RX 258: Performed by: EMERGENCY MEDICINE

## 2018-10-15 PROCEDURE — 93308 TTE F-UP OR LMTD: CPT

## 2018-10-15 PROCEDURE — 76937 US GUIDE VASCULAR ACCESS: CPT

## 2018-10-15 PROCEDURE — 87086 URINE CULTURE/COLONY COUNT: CPT

## 2018-10-15 PROCEDURE — 6360000002 HC RX W HCPCS: Performed by: EMERGENCY MEDICINE

## 2018-10-15 PROCEDURE — 6370000000 HC RX 637 (ALT 250 FOR IP): Performed by: NURSE PRACTITIONER

## 2018-10-15 PROCEDURE — A9538 TC99M PYROPHOSPHATE: HCPCS | Performed by: EMERGENCY MEDICINE

## 2018-10-15 PROCEDURE — 84300 ASSAY OF URINE SODIUM: CPT

## 2018-10-15 PROCEDURE — 96366 THER/PROPH/DIAG IV INF ADDON: CPT

## 2018-10-15 PROCEDURE — A9540 TC99M MAA: HCPCS | Performed by: EMERGENCY MEDICINE

## 2018-10-15 PROCEDURE — 82570 ASSAY OF URINE CREATININE: CPT

## 2018-10-15 PROCEDURE — 85730 THROMBOPLASTIN TIME PARTIAL: CPT

## 2018-10-15 PROCEDURE — 36415 COLL VENOUS BLD VENIPUNCTURE: CPT

## 2018-10-15 PROCEDURE — 2580000003 HC RX 258: Performed by: NURSE PRACTITIONER

## 2018-10-15 PROCEDURE — 71045 X-RAY EXAM CHEST 1 VIEW: CPT

## 2018-10-15 PROCEDURE — 2060000000 HC ICU INTERMEDIATE R&B

## 2018-10-15 PROCEDURE — 84443 ASSAY THYROID STIM HORMONE: CPT

## 2018-10-15 PROCEDURE — 82565 ASSAY OF CREATININE: CPT

## 2018-10-15 PROCEDURE — 99223 1ST HOSP IP/OBS HIGH 75: CPT | Performed by: INTERNAL MEDICINE

## 2018-10-15 PROCEDURE — 78582 LUNG VENTILAT&PERFUS IMAGING: CPT

## 2018-10-15 PROCEDURE — 84520 ASSAY OF UREA NITROGEN: CPT

## 2018-10-15 PROCEDURE — 6360000002 HC RX W HCPCS: Performed by: INTERNAL MEDICINE

## 2018-10-15 PROCEDURE — 85027 COMPLETE CBC AUTOMATED: CPT

## 2018-10-15 PROCEDURE — 87088 URINE BACTERIA CULTURE: CPT

## 2018-10-15 PROCEDURE — 74176 CT ABD & PELVIS W/O CONTRAST: CPT

## 2018-10-15 PROCEDURE — 83880 ASSAY OF NATRIURETIC PEPTIDE: CPT

## 2018-10-15 PROCEDURE — 3430000000 HC RX DIAGNOSTIC RADIOPHARMACEUTICAL: Performed by: EMERGENCY MEDICINE

## 2018-10-15 PROCEDURE — 2500000003 HC RX 250 WO HCPCS: Performed by: EMERGENCY MEDICINE

## 2018-10-15 PROCEDURE — 2500000003 HC RX 250 WO HCPCS

## 2018-10-15 PROCEDURE — 87186 SC STD MICRODIL/AGAR DIL: CPT

## 2018-10-15 PROCEDURE — 6360000002 HC RX W HCPCS: Performed by: NURSE PRACTITIONER

## 2018-10-15 PROCEDURE — 6370000000 HC RX 637 (ALT 250 FOR IP): Performed by: INTERNAL MEDICINE

## 2018-10-15 PROCEDURE — 96367 TX/PROPH/DG ADDL SEQ IV INF: CPT

## 2018-10-15 PROCEDURE — 84156 ASSAY OF PROTEIN URINE: CPT

## 2018-10-15 PROCEDURE — 96368 THER/DIAG CONCURRENT INF: CPT

## 2018-10-15 PROCEDURE — 83735 ASSAY OF MAGNESIUM: CPT

## 2018-10-15 PROCEDURE — 6360000002 HC RX W HCPCS

## 2018-10-15 PROCEDURE — 96375 TX/PRO/DX INJ NEW DRUG ADDON: CPT

## 2018-10-15 PROCEDURE — 81001 URINALYSIS AUTO W/SCOPE: CPT

## 2018-10-15 PROCEDURE — 84100 ASSAY OF PHOSPHORUS: CPT

## 2018-10-15 PROCEDURE — 84484 ASSAY OF TROPONIN QUANT: CPT

## 2018-10-15 PROCEDURE — 83605 ASSAY OF LACTIC ACID: CPT

## 2018-10-15 PROCEDURE — 82947 ASSAY GLUCOSE BLOOD QUANT: CPT

## 2018-10-15 PROCEDURE — 6370000000 HC RX 637 (ALT 250 FOR IP): Performed by: EMERGENCY MEDICINE

## 2018-10-15 PROCEDURE — 96376 TX/PRO/DX INJ SAME DRUG ADON: CPT

## 2018-10-15 PROCEDURE — 93005 ELECTROCARDIOGRAM TRACING: CPT

## 2018-10-15 PROCEDURE — 96365 THER/PROPH/DIAG IV INF INIT: CPT

## 2018-10-15 RX ORDER — ADENOSINE 3 MG/ML
INJECTION, SOLUTION INTRAVENOUS
Status: COMPLETED
Start: 2018-10-15 | End: 2018-10-15

## 2018-10-15 RX ORDER — MORPHINE SULFATE 4 MG/ML
4 INJECTION, SOLUTION INTRAMUSCULAR; INTRAVENOUS
Status: DISCONTINUED | OUTPATIENT
Start: 2018-10-15 | End: 2018-10-23

## 2018-10-15 RX ORDER — NICOTINE POLACRILEX 4 MG
15 LOZENGE BUCCAL PRN
Status: DISCONTINUED | OUTPATIENT
Start: 2018-10-15 | End: 2018-10-24 | Stop reason: HOSPADM

## 2018-10-15 RX ORDER — DEXTROSE, SODIUM CHLORIDE, AND POTASSIUM CHLORIDE 5; .45; .15 G/100ML; G/100ML; G/100ML
INJECTION INTRAVENOUS CONTINUOUS
Status: DISCONTINUED | OUTPATIENT
Start: 2018-10-15 | End: 2018-10-15

## 2018-10-15 RX ORDER — ACETYLCYSTEINE 200 MG/ML
600 SOLUTION ORAL; RESPIRATORY (INHALATION) 2 TIMES DAILY
Status: COMPLETED | OUTPATIENT
Start: 2018-10-15 | End: 2018-10-17

## 2018-10-15 RX ORDER — ATORVASTATIN CALCIUM 10 MG/1
10 TABLET, FILM COATED ORAL DAILY
Status: DISCONTINUED | OUTPATIENT
Start: 2018-10-15 | End: 2018-10-17

## 2018-10-15 RX ORDER — LANOLIN ALCOHOL/MO/W.PET/CERES
325 CREAM (GRAM) TOPICAL 2 TIMES DAILY WITH MEALS
Status: DISCONTINUED | OUTPATIENT
Start: 2018-10-15 | End: 2018-10-24 | Stop reason: HOSPADM

## 2018-10-15 RX ORDER — ASPIRIN 81 MG/1
324 TABLET, CHEWABLE ORAL ONCE
Status: COMPLETED | OUTPATIENT
Start: 2018-10-15 | End: 2018-10-15

## 2018-10-15 RX ORDER — SODIUM CHLORIDE 0.9 % (FLUSH) 0.9 %
10 SYRINGE (ML) INJECTION EVERY 12 HOURS SCHEDULED
Status: DISCONTINUED | OUTPATIENT
Start: 2018-10-15 | End: 2018-10-24 | Stop reason: HOSPADM

## 2018-10-15 RX ORDER — PANTOPRAZOLE SODIUM 20 MG/1
20 TABLET, DELAYED RELEASE ORAL DAILY
Status: DISCONTINUED | OUTPATIENT
Start: 2018-10-15 | End: 2018-10-24 | Stop reason: HOSPADM

## 2018-10-15 RX ORDER — ONDANSETRON 2 MG/ML
4 INJECTION INTRAMUSCULAR; INTRAVENOUS EVERY 6 HOURS PRN
Status: DISCONTINUED | OUTPATIENT
Start: 2018-10-15 | End: 2018-10-24 | Stop reason: HOSPADM

## 2018-10-15 RX ORDER — HEPARIN SODIUM 1000 [USP'U]/ML
2000 INJECTION, SOLUTION INTRAVENOUS; SUBCUTANEOUS PRN
Status: DISCONTINUED | OUTPATIENT
Start: 2018-10-15 | End: 2018-10-17

## 2018-10-15 RX ORDER — HYDROCODONE BITARTRATE AND ACETAMINOPHEN 5; 325 MG/1; MG/1
1 TABLET ORAL EVERY 4 HOURS PRN
Status: DISCONTINUED | OUTPATIENT
Start: 2018-10-15 | End: 2018-10-20

## 2018-10-15 RX ORDER — INSULIN GLARGINE 100 [IU]/ML
40 INJECTION, SOLUTION SUBCUTANEOUS NIGHTLY
Status: DISCONTINUED | OUTPATIENT
Start: 2018-10-15 | End: 2018-10-16

## 2018-10-15 RX ORDER — SODIUM CHLORIDE 0.9 % (FLUSH) 0.9 %
10 SYRINGE (ML) INJECTION PRN
Status: DISCONTINUED | OUTPATIENT
Start: 2018-10-15 | End: 2018-10-24 | Stop reason: HOSPADM

## 2018-10-15 RX ORDER — PREGABALIN 50 MG/1
50 CAPSULE ORAL 3 TIMES DAILY
Status: DISCONTINUED | OUTPATIENT
Start: 2018-10-15 | End: 2018-10-24 | Stop reason: HOSPADM

## 2018-10-15 RX ORDER — HYDROCHLOROTHIAZIDE 25 MG/1
25 TABLET ORAL DAILY
Status: DISCONTINUED | OUTPATIENT
Start: 2018-10-15 | End: 2018-10-15

## 2018-10-15 RX ORDER — POTASSIUM CHLORIDE 7.45 MG/ML
10 INJECTION INTRAVENOUS PRN
Status: DISCONTINUED | OUTPATIENT
Start: 2018-10-15 | End: 2018-10-23

## 2018-10-15 RX ORDER — HEPARIN SODIUM 1000 [USP'U]/ML
4000 INJECTION, SOLUTION INTRAVENOUS; SUBCUTANEOUS ONCE
Status: COMPLETED | OUTPATIENT
Start: 2018-10-15 | End: 2018-10-15

## 2018-10-15 RX ORDER — AMLODIPINE BESYLATE 10 MG/1
10 TABLET ORAL DAILY
Status: DISCONTINUED | OUTPATIENT
Start: 2018-10-15 | End: 2018-10-15

## 2018-10-15 RX ORDER — CARVEDILOL 25 MG/1
25 TABLET ORAL 2 TIMES DAILY WITH MEALS
Status: DISCONTINUED | OUTPATIENT
Start: 2018-10-15 | End: 2018-10-15

## 2018-10-15 RX ORDER — 0.9 % SODIUM CHLORIDE 0.9 %
1000 INTRAVENOUS SOLUTION INTRAVENOUS ONCE
Status: DISCONTINUED | OUTPATIENT
Start: 2018-10-15 | End: 2018-10-16

## 2018-10-15 RX ORDER — ACETAMINOPHEN 325 MG/1
650 TABLET ORAL EVERY 4 HOURS PRN
Status: DISCONTINUED | OUTPATIENT
Start: 2018-10-15 | End: 2018-10-24 | Stop reason: HOSPADM

## 2018-10-15 RX ORDER — HEPARIN SODIUM 10000 [USP'U]/100ML
1000 INJECTION, SOLUTION INTRAVENOUS CONTINUOUS
Status: DISCONTINUED | OUTPATIENT
Start: 2018-10-15 | End: 2018-10-17

## 2018-10-15 RX ORDER — DEXTROSE MONOHYDRATE 25 G/50ML
12.5 INJECTION, SOLUTION INTRAVENOUS PRN
Status: DISCONTINUED | OUTPATIENT
Start: 2018-10-15 | End: 2018-10-24 | Stop reason: HOSPADM

## 2018-10-15 RX ORDER — HYDROCODONE BITARTRATE AND ACETAMINOPHEN 5; 325 MG/1; MG/1
2 TABLET ORAL EVERY 4 HOURS PRN
Status: DISCONTINUED | OUTPATIENT
Start: 2018-10-15 | End: 2018-10-20

## 2018-10-15 RX ORDER — FENTANYL CITRATE 50 UG/ML
50 INJECTION, SOLUTION INTRAMUSCULAR; INTRAVENOUS ONCE
Status: COMPLETED | OUTPATIENT
Start: 2018-10-15 | End: 2018-10-15

## 2018-10-15 RX ORDER — SODIUM CHLORIDE 9 MG/ML
INJECTION, SOLUTION INTRAVENOUS CONTINUOUS
Status: DISCONTINUED | OUTPATIENT
Start: 2018-10-16 | End: 2018-10-20

## 2018-10-15 RX ORDER — ADENOSINE 3 MG/ML
6 INJECTION, SOLUTION INTRAVENOUS ONCE
Status: COMPLETED | OUTPATIENT
Start: 2018-10-15 | End: 2018-10-15

## 2018-10-15 RX ORDER — MAGNESIUM SULFATE 1 G/100ML
1 INJECTION INTRAVENOUS
Status: COMPLETED | OUTPATIENT
Start: 2018-10-15 | End: 2018-10-15

## 2018-10-15 RX ORDER — MORPHINE SULFATE 4 MG/ML
2 INJECTION, SOLUTION INTRAMUSCULAR; INTRAVENOUS
Status: DISCONTINUED | OUTPATIENT
Start: 2018-10-15 | End: 2018-10-23

## 2018-10-15 RX ORDER — HEPARIN SODIUM 1000 [USP'U]/ML
4000 INJECTION, SOLUTION INTRAVENOUS; SUBCUTANEOUS PRN
Status: DISCONTINUED | OUTPATIENT
Start: 2018-10-15 | End: 2018-10-17

## 2018-10-15 RX ORDER — MAGNESIUM SULFATE 1 G/100ML
1 INJECTION INTRAVENOUS PRN
Status: DISCONTINUED | OUTPATIENT
Start: 2018-10-15 | End: 2018-10-24 | Stop reason: HOSPADM

## 2018-10-15 RX ORDER — POTASSIUM CHLORIDE 20MEQ/15ML
40 LIQUID (ML) ORAL PRN
Status: DISCONTINUED | OUTPATIENT
Start: 2018-10-15 | End: 2018-10-23

## 2018-10-15 RX ORDER — DILTIAZEM HYDROCHLORIDE 5 MG/ML
INJECTION INTRAVENOUS
Status: COMPLETED
Start: 2018-10-15 | End: 2018-10-15

## 2018-10-15 RX ORDER — DEXTROSE MONOHYDRATE 50 MG/ML
100 INJECTION, SOLUTION INTRAVENOUS PRN
Status: DISCONTINUED | OUTPATIENT
Start: 2018-10-15 | End: 2018-10-24 | Stop reason: HOSPADM

## 2018-10-15 RX ORDER — ASPIRIN 81 MG/1
81 TABLET, CHEWABLE ORAL DAILY
Status: DISCONTINUED | OUTPATIENT
Start: 2018-10-15 | End: 2018-10-24 | Stop reason: HOSPADM

## 2018-10-15 RX ORDER — POTASSIUM CHLORIDE 20 MEQ/1
40 TABLET, EXTENDED RELEASE ORAL PRN
Status: DISCONTINUED | OUTPATIENT
Start: 2018-10-15 | End: 2018-10-23

## 2018-10-15 RX ORDER — NITROGLYCERIN 0.4 MG/1
0.4 TABLET SUBLINGUAL EVERY 5 MIN PRN
Status: DISCONTINUED | OUTPATIENT
Start: 2018-10-15 | End: 2018-10-24 | Stop reason: HOSPADM

## 2018-10-15 RX ORDER — SUCRALFATE 1 G/1
1 TABLET ORAL 4 TIMES DAILY
Status: DISCONTINUED | OUTPATIENT
Start: 2018-10-15 | End: 2018-10-21

## 2018-10-15 RX ADMIN — HEPARIN SODIUM AND DEXTROSE 1000 UNITS/HR: 10000; 5 INJECTION INTRAVENOUS at 00:37

## 2018-10-15 RX ADMIN — ASPIRIN 81 MG 324 MG: 81 TABLET ORAL at 10:28

## 2018-10-15 RX ADMIN — MAGNESIUM SULFATE HEPTAHYDRATE 1 G: 1 INJECTION, SOLUTION INTRAVENOUS at 00:37

## 2018-10-15 RX ADMIN — FERROUS SULFATE TAB EC 325 MG (65 MG FE EQUIVALENT) 325 MG: 325 (65 FE) TABLET DELAYED RESPONSE at 15:40

## 2018-10-15 RX ADMIN — METOPROLOL TARTRATE 25 MG: 25 TABLET ORAL at 10:10

## 2018-10-15 RX ADMIN — SUCRALFATE 1 G: 1 TABLET ORAL at 15:00

## 2018-10-15 RX ADMIN — Medication 600 MG: at 20:12

## 2018-10-15 RX ADMIN — INSULIN LISPRO 9 UNITS: 100 INJECTION, SOLUTION INTRAVENOUS; SUBCUTANEOUS at 21:09

## 2018-10-15 RX ADMIN — Medication 1000 UNITS/HR: at 08:00

## 2018-10-15 RX ADMIN — SUCRALFATE 1 G: 1 TABLET ORAL at 15:39

## 2018-10-15 RX ADMIN — HEPARIN SODIUM 4000 UNITS: 1000 INJECTION, SOLUTION INTRAVENOUS; SUBCUTANEOUS at 09:00

## 2018-10-15 RX ADMIN — INSULIN GLARGINE 40 UNITS: 100 INJECTION, SOLUTION SUBCUTANEOUS at 22:06

## 2018-10-15 RX ADMIN — SUCRALFATE 1 G: 1 TABLET ORAL at 21:08

## 2018-10-15 RX ADMIN — ONDANSETRON HYDROCHLORIDE 4 MG: 2 INJECTION, SOLUTION INTRAMUSCULAR; INTRAVENOUS at 00:01

## 2018-10-15 RX ADMIN — Medication 10 ML: at 21:09

## 2018-10-15 RX ADMIN — DILTIAZEM HYDROCHLORIDE 20 MG: 5 INJECTION INTRAVENOUS at 00:48

## 2018-10-15 RX ADMIN — AMLODIPINE BESYLATE 10 MG: 10 TABLET ORAL at 09:56

## 2018-10-15 RX ADMIN — Medication 37 MILLICURIE: at 10:50

## 2018-10-15 RX ADMIN — HEPARIN SODIUM 4000 UNITS: 1000 INJECTION, SOLUTION INTRAVENOUS; SUBCUTANEOUS at 00:31

## 2018-10-15 RX ADMIN — ADENOSINE 6 MG: 3 INJECTION, SOLUTION INTRAVENOUS at 00:17

## 2018-10-15 RX ADMIN — ADENOSINE 6 MG: 3 INJECTION, SOLUTION INTRAVENOUS at 00:26

## 2018-10-15 RX ADMIN — INSULIN LISPRO 12 UNITS: 100 INJECTION, SOLUTION INTRAVENOUS; SUBCUTANEOUS at 17:44

## 2018-10-15 RX ADMIN — Medication 1500 MG: at 15:40

## 2018-10-15 RX ADMIN — Medication 1500 MG: at 03:28

## 2018-10-15 RX ADMIN — PREGABALIN 50 MG: 50 CAPSULE ORAL at 14:39

## 2018-10-15 RX ADMIN — Medication 6 MILLICURIE: at 11:15

## 2018-10-15 RX ADMIN — MAGNESIUM SULFATE HEPTAHYDRATE 1 G: 1 INJECTION, SOLUTION INTRAVENOUS at 00:11

## 2018-10-15 RX ADMIN — PREGABALIN 50 MG: 50 CAPSULE ORAL at 21:09

## 2018-10-15 RX ADMIN — HYDROCODONE BITARTRATE AND ACETAMINOPHEN 2 TABLET: 5; 325 TABLET ORAL at 07:40

## 2018-10-15 RX ADMIN — FERROUS SULFATE TAB EC 325 MG (65 MG FE EQUIVALENT) 325 MG: 325 (65 FE) TABLET DELAYED RESPONSE at 10:11

## 2018-10-15 RX ADMIN — FENTANYL CITRATE 50 MCG: 50 INJECTION INTRAMUSCULAR; INTRAVENOUS at 02:48

## 2018-10-15 RX ADMIN — PIPERACILLIN AND TAZOBACTAM 3.38 G: 3; .375 INJECTION, POWDER, LYOPHILIZED, FOR SOLUTION INTRAVENOUS; PARENTERAL at 02:38

## 2018-10-15 RX ADMIN — DILTIAZEM HYDROCHLORIDE 10 MG/HR: 5 INJECTION INTRAVENOUS at 00:52

## 2018-10-15 RX ADMIN — HEPARIN SODIUM 2000 UNITS: 1000 INJECTION, SOLUTION INTRAVENOUS; SUBCUTANEOUS at 07:07

## 2018-10-15 RX ADMIN — PREGABALIN 50 MG: 50 CAPSULE ORAL at 09:55

## 2018-10-15 RX ADMIN — INSULIN LISPRO 10 UNITS: 100 INJECTION, SOLUTION INTRAVENOUS; SUBCUTANEOUS at 12:14

## 2018-10-15 RX ADMIN — FENTANYL CITRATE 50 MCG: 50 INJECTION, SOLUTION INTRAMUSCULAR; INTRAVENOUS at 00:00

## 2018-10-15 RX ADMIN — ATORVASTATIN CALCIUM 10 MG: 10 TABLET, FILM COATED ORAL at 12:14

## 2018-10-15 RX ADMIN — SODIUM CHLORIDE, PRESERVATIVE FREE 10 ML: 5 INJECTION INTRAVENOUS at 11:15

## 2018-10-15 RX ADMIN — HYDROCODONE BITARTRATE AND ACETAMINOPHEN 2 TABLET: 5; 325 TABLET ORAL at 20:02

## 2018-10-15 RX ADMIN — HEPARIN SODIUM 4000 UNITS: 1000 INJECTION, SOLUTION INTRAVENOUS; SUBCUTANEOUS at 17:57

## 2018-10-15 RX ADMIN — Medication 12.3 UNITS/KG/HR: at 17:59

## 2018-10-15 RX ADMIN — HYDROCODONE BITARTRATE AND ACETAMINOPHEN 2 TABLET: 5; 325 TABLET ORAL at 23:55

## 2018-10-15 RX ADMIN — ASPIRIN 81 MG 324 MG: 81 TABLET ORAL at 00:30

## 2018-10-15 RX ADMIN — PANTOPRAZOLE 20 MG: 20 TABLET, DELAYED RELEASE ORAL at 09:59

## 2018-10-15 ASSESSMENT — PAIN DESCRIPTION - LOCATION
LOCATION: ABDOMEN
LOCATION: GENERALIZED

## 2018-10-15 ASSESSMENT — PAIN DESCRIPTION - DESCRIPTORS
DESCRIPTORS: SHARP;BURNING
DESCRIPTORS: BURNING;SHARP
DESCRIPTORS: BURNING;SHARP

## 2018-10-15 ASSESSMENT — PAIN SCALES - GENERAL
PAINLEVEL_OUTOF10: 0
PAINLEVEL_OUTOF10: 5
PAINLEVEL_OUTOF10: 6
PAINLEVEL_OUTOF10: 8
PAINLEVEL_OUTOF10: 1
PAINLEVEL_OUTOF10: 10
PAINLEVEL_OUTOF10: 2
PAINLEVEL_OUTOF10: 2
PAINLEVEL_OUTOF10: 8
PAINLEVEL_OUTOF10: 2

## 2018-10-15 ASSESSMENT — PAIN DESCRIPTION - FREQUENCY
FREQUENCY: CONTINUOUS
FREQUENCY: CONTINUOUS

## 2018-10-15 ASSESSMENT — ENCOUNTER SYMPTOMS
SHORTNESS OF BREATH: 1
CHEST TIGHTNESS: 0
DIARRHEA: 0
COLOR CHANGE: 0
BACK PAIN: 0
ABDOMINAL PAIN: 1
VOMITING: 1
NAUSEA: 1

## 2018-10-15 ASSESSMENT — PAIN DESCRIPTION - PAIN TYPE
TYPE: ACUTE PAIN

## 2018-10-15 ASSESSMENT — PAIN - FUNCTIONAL ASSESSMENT: PAIN_FUNCTIONAL_ASSESSMENT: 0-10

## 2018-10-15 NOTE — ED NOTES
Adenosine 6mg IV given rapid IV push. Patient converted to sinus rhythm with bigeminy. HR 96.   2nd EKG done.       Mikala Silverman RN  10/15/18 9980

## 2018-10-15 NOTE — PROGRESS NOTES
Smoking Cessation - topics covered   []  Health Risks  []  Benefits of Quitting   []  Smoking Cessation  []  Patient has no history of tobacco use  [x]  Patient is former smoker. [x]  No need for tobacco cessation education. []  Booklet given  []  Patient verbalizes understanding. []  Patient denies need for tobacco cessation education.   Carolyn Xie  9:45 AM

## 2018-10-15 NOTE — CONSULTS
[facility]   Consultation Note               Today's Date: 10/15/2018  Patient Name: Holland Miller  Date of admission: 10/14/2018 11:18 PM  Patient's age: 45 y.o., 1965  Admission Dx: Acute coronary syndrome ([address] [I24.9]    Reason for Consult:  Elevated troponins, SVT    Requesting Physician: Quirino Brambila MD    CHIEF COMPLAINT:  Nausea/vomiting    History Obtained From:  patient, electronic medical record    HISTORY OF PRESENT ILLNESS:      The patient is a 45 y.o.  male who was admitted to the hospital for nausea/vomiting. He states that had about 30 episodes of vomiting that brought him to the ED. He was noted to have SVT in the ED with HR in the 160s to 180s. He was given Adenosine that converted him, but the same thing happened two more times requiring Adenosine. He states he has been feeling lightheaded, short of breath, but denies any chest pain. Past Medical History:   has a past medical history of Dysphagia; Equinus contracture of right ankle; Foot infection; Hand swelling; HTN (hypertension); Hyperglycemia without ketosis; Hyperkalemia; Leukocytosis; Mixed hyperlipidemia; MRSA infection; Nausea & vomiting; Neutrophilic leukocytosis; Osteomyelitis ([address] Osteomyelitis of foot ([address] Painful orthopaedic hardware [facility]); Scrotum swelling; Septic arthritis ([address] Septic arthritis of wrist, left ([address] Sickle-cell trait ([address] Skin ulcers of foot, bilateral ([address] Steroid-induced hyperglycemia; Streptococcal arthritis of left wrist ([address] Type 2 diabetes mellitus, uncontrolled ([address] Uncontrolled type 2 diabetes mellitus with diabetic nephropathy, with long-term current use of insulin ([address] and Uncontrolled type 2 diabetes mellitus with retinopathy, with long-term current use of insulin ([address] Past Surgical History:   has a past surgical history that includes amputation and amputation.        Home Medications:    Prior to Admission medications Medication Sig Start Date End Date Taking? Authorizing Provider   docusate sodium (COLACE) 100 MG capsule Take 100 mg by mouth 2 times daily    Historical Provider, MD   ferrous sulfate 325 (65 Fe) MG tablet Take 325 mg by mouth 2 times daily (with meals)    Historical Provider, MD   insulin NPH (HUMULIN N;NOVOLIN N) 100 UNIT/ML injection vial While on 50 mg prednisone, last dose 3/14/18  80 units of NPH in the AM, 55 units of NPH with dinner  15 of Regular with meals   Regular insulin 2 units per every 50 mg/dl greater than 150 mg/dl     3/15 return to previous insulin doses:  50 units of NPH with breakfast, 35 units of NPH with dinner  10 units of Regular insulin with meals  Regular in 3/13/18   Historical Provider, MD   ammonium lactate (AMLACTIN) 12 % cream Apply topically    Historical Provider, MD   hydrochlorothiazide (HYDRODIURIL) 25 MG tablet Take 25 mg by mouth 9/20/18 10/20/18  Historical Provider, MD   pregabalin (LYRICA) 50 MG capsule Take 50 mg by mouth 3 times daily. Daron Arzola     Historical Provider, MD   sucralfate (CARAFATE) 1 GM tablet Take 1 tablet by mouth 4 times daily 10/7/18   OLIVER Carver CNP   ondansetron (ZOFRAN ODT) 4 MG disintegrating tablet Take 1 tablet by mouth every 8 hours as needed for Nausea 10/7/18   OLIVER Carver CNP   carvedilol (COREG) 25 MG tablet Take 25 mg by mouth 2 times daily (with meals)    Historical Provider, MD   amLODIPine (NORVASC) 10 MG tablet Take 10 mg by mouth daily    Historical Provider, MD   pantoprazole (PROTONIX) 20 MG tablet Take 20 mg by mouth daily    Historical Provider, MD   atorvastatin (LIPITOR) 10 MG tablet Take by mouth daily    Historical Provider, MD   insulin glargine (LANTUS) 100 UNIT/ML injection vial Inject into the skin nightly    Historical Provider, MD   Insulin Zinc Human (HUMULIN L SC) Inject into the skin    Historical Provider, MD   aspirin 81 MG tablet Take 81 mg by mouth daily    Historical Provider, MD      Current strength, numbness or tingling. No change in gait, balance, coordination, mood, affect, memory, mentation, behavior. · Psychiatric: No anxiety, or depression. · Endocrine: No temperature intolerance. No excessive thirst, fluid intake, or urination. No tremor. · Hematologic/Lymphatic: No abnormal bruising or bleeding, blood clots or swollen lymph nodes. · Allergic/Immunologic: No nasal congestion or hives. PHYSICAL EXAM:      BP (!) 143/71   Pulse 86   Temp 98.4 °F (36.9 °C) (Oral)   Resp 24   Ht 6' 4\" (1.93 m)   Wt (!) 350 lb (158.8 kg)   SpO2 93%   BMI 42.60 kg/m²    Constitutional and General Appearance: Alert, cooperative, no distress and appears stated age  HEENT: PERRLA, no cervical lymphadenopathy. No masses palpable. Respiratory:  · Normal excursion and expansion without use of accessory muscles  · Resp Auscultation: Fair respiratory effort. No increased work of breathing. · Clear to auscultation bilaterally  Cardiovascular:  · Normal S1 and S2. RRR  · Jugular venous pulsation Normal  · The carotid upstroke is normal in amplitude and contour without delay or bruit  · Peripheral pulses are symmetrical and full   Abdomen:   · Soft  · No tenderness  · Bowel sounds present  Extremities:  · No cyanosis or clubbing  · No lower extremity edema  · Skin: Warm and dry  Neurologic:  · Alert and oriented.   · Moves all extremities well  Psychiatric:  · No abnormalities of mood, affect, memory, mentation, or behavior are noted      DATA:    Diagnostics:    EKG:   NSR, RBBB, LAE, inferior and anteroseptal infarct      ECHO: pending      Labs:     CBC:   Recent Labs      10/14/18   0003  10/15/18   0625   WBC  11.3  9.6   HGB  13.1  11.7*   HCT  42.0  37.5*   PLT  475*  334     BMP:   Recent Labs      10/14/18   0003  10/14/18   2350  10/15/18   0625   NA  137   --    --    K  4.2   --    --    CO2  30   --    --    BUN  44*   --   42*   CREATININE  1.97*  2.03*  1.68*   LABGLOM  36*  34*  43*   GLUCOSE

## 2018-10-15 NOTE — CONSULTS
CMP, magnesium, CBC, phosphorus, uric acid and intact PTH in the am  3. No renal ultrasound as just had it done  4. Mucomyst 600 mg oral twice daily x 4 doses  5. Begin IV fluids at 100 ml/hr of normal saline beginning 6 hours prior to cardiac cath and continuing 2 hours post cardiac cath. 6. Follow renal function and I and O following the cardiac cath  7. Eventually, it may be prudent to consider discontinuing the amlodipine with the patient's significant edema and switching to another antihypertensive. Thank you for the consultation. Please do not hesitate to call with questions. Yury Howard M.D.   [facility]                Electronically signed by Yury Howard MD on 10/15/2018 at 2:52 PM

## 2018-10-15 NOTE — ED PROVIDER NOTES
[facility] ED  Emergency Department  Emergency Medicine Resident Sign-out     Care of Claudean Hook was assumed from Dr. Mayela Pineda and is being seen for Emesis (started 11am) and Palpitations  . The patient's initial evaluation and plan have been discussed with the prior provider who initially evaluated the patient.      EMERGENCY DEPARTMENT COURSE / MEDICAL DECISION MAKING:       MEDICATIONS GIVEN:  Orders Placed This Encounter   Medications    sodium chloride flush 0.9 % injection 10 mL    sodium chloride flush 0.9 % injection 10 mL    0.9 % sodium chloride bolus    ondansetron (ZOFRAN) injection 4 mg    DISCONTD: fentaNYL (SUBLIMAZE) injection 25 mcg    fentaNYL (SUBLIMAZE) injection 50 mcg    magnesium sulfate 1 g in dextrose 5% 100 mL IVPB    adenosine (ADENOCARD) 6 MG/2ML injection     CRISTINA STOUT: cabinet override    aspirin chewable tablet 324 mg    heparin (porcine) injection 4,000 Units    heparin (porcine) injection 4,000 Units    heparin (porcine) injection 2,000 Units    heparin 25,000 units in dextrose 5% 250 mL infusion    adenosine (ADENOCARD) 6 MG/2ML injection     CRISTINA STOUT: cabinet override    0.9 % sodium chloride bolus    diltiazem 125 mg in dextrose 5 % 125 mL infusion    adenosine (ADENOCARD) injection 6 mg    adenosine (ADENOCARD) injection 6 mg    diltiazem 50 MG/10ML injection     Rachael Chamberlain: cabinet override    piperacillin-tazobactam (ZOSYN) 3.375 g in dextrose 5 % 50 mL IVPB (mini-bag)    vancomycin (VANCOCIN) intermittent dosing (placeholder)    vancomycin (VANCOCIN) 1500 mg in dextrose 5 % 250 mL IVPB    fentaNYL (SUBLIMAZE) injection 50 mcg    OR Linked Order Group     HYDROcodone-acetaminophen (NORCO) 5-325 MG per tablet 1 tablet     HYDROcodone-acetaminophen (NORCO) 5-325 MG per tablet 2 tablet    OR Linked Order Group     morphine (PF) injection 2 mg     morphine (PF) injection 4 mg       LABS / epigastric tenderness. Hernia. FINDINGS: Lower Chest:  Visualized portion of the lower chest demonstrates no acute abnormality. Scarring and probable partial atelectasis in the right lower lobe. Organs: Evaluation of abdominal organs and bowel is limited due to lack of IV contrast.  Liver, spleen, pancreas and adrenal glands are within normal limits. Unremarkable gallbladder. No enlarged lymph nodes identified in the abdomen and pelvis. No hydronephrosis. GI/Bowel: Please see below for description of inguinal hernia contents. .  No focal enterocolitis. No bowel obstruction. Colonic diverticulosis. No evidence of appendicitis. No free air. Pelvis: Large right inguinal hernia containing cecum and proximal right colon as well as small bowel. Appendix is nondilated. Concurrent hydrocele in the right inguinal hernia sac. There is no associated bowel obstruction. There is a smaller left inguinal hernia containing fat only. Minimal wall thickening and pericolonic edema adjacent to the proximal right colon. Peritoneum/Retroperitoneum: No abdominal aortic aneurysm. Bones/Soft Tissues: No acute osseous findings identified. 1. Large right inguinal hernia containing proximal right colon, appendix and small bowel. There is associated hydrocele. No bowel obstruction. 2. Slight wall thickening and pericolonic edema in the intrapelvic portion of the proximal right colon. Correlate for possible mild colitis. 3. Small left inguinal hernia containing fat only. Ct Abdomen Pelvis Wo Contrast    Result Date: 10/7/2018  EXAMINATION: CT OF THE ABDOMEN AND PELVIS WITHOUT CONTRAST 10/6/2018 11:43 pm TECHNIQUE: CT of the abdomen and pelvis was performed without the administration of intravenous contrast. Multiplanar reformatted images are provided for review. Dose modulation, iterative reconstruction, and/or weight based adjustment of the mA/kV was utilized to reduce the radiation dose to as low as reasonably achievable. Prominence of the right hilum. Finding is nonspecific but can be seen with enlarged hilar lymph nodes. Consider further assessment with chest CT as clinically indicated. RECENT VITALS:     Temp: 98.1 °F (36.7 °C),  Pulse: 80, Resp: 18, BP: (!) 158/103, SpO2: 96 %    This patient is a 45 y.o. Male with abdominal pain with nausea and vomiting since yesterday. Patient was found to have a SVT with aberrancy was given 12 mg adenosine which converted into a sinus rhythm however 5 minutes later he had returned to my SVT received 6 mg of adenosine. Patient got a Cardizem drip and bolus. Patient has been rate controlled with Cardizem. Patient's CT abdomen showed a right inguinal hernia. Patient's troponins were elevated and started on heparin per cardiology's recommendations. Cardiology consult. OUTSTANDING TASKS / RECOMMENDATIONS:    1. Awaiting transfer to the floor     FINAL IMPRESSION:     1. NSTEMI (non-ST elevated myocardial infarction) ([facility]    2. SVT (supraventricular tachycardia) ([facility] [address]        DISPOSITION:         DISPOSITION:  []  Discharge   []  Transfer -    [x]  Admission - InterMed     []  Against Medical Advice   []  Eloped   FOLLOW-UP: No follow-up provider specified.    DISCHARGE MEDICATIONS: New Prescriptions    No medications on file          Casandra Whitten [facility]  Emergency Medicine Resident  [address] [facility]  Resident  10/15/18 5568

## 2018-10-15 NOTE — ED PROVIDER NOTES
[facility]     Emergency Department     Faculty Attestation    I performed a history and physical examination of the patient and discussed management with the resident. I have reviewed and agree with the residents findings including all diagnostic interpretations, and treatment plans as written at the time of my review. Any areas of disagreement are noted on the chart. I was personally present for the key portions of any procedures. I have documented in the chart those procedures where I was not present during the key portions. I agree with the chief complaint, past medical history, past surgical history, allergies, medications, social and family history as documented unless otherwise noted below. Documentation of the HPI, Physical Exam and Medical Decision Making performed by jeanette is based on my personal performance of the HPI, PE and MDM. For Physician Assistant/ Nurse Practitioner cases/documentation I have personally evaluated this patient and have completed at least one if not all key elements of the E/M (history, physical exam, and MDM). Additional findings are as noted. Primary Care Physician: No primary care provider on file. History: This is a 45 y.o. male who presents to the Emergency Department with complaint of nausea vomiting abdominal pain tachycardia. Patient states yesterday began having experiencing nausea with some vomiting. Also complains of abdominal pain and with a history of previous pancreatitis. Physical:   height is 6' 4\" (1.93 m) and weight is 350 lb (158.8 kg) (abnormal). His oral temperature is 98.2 °F (36.8 °C). His blood pressure is 124/99 (abnormal) and his pulse is 169. His respiration is 24 and oxygen saturation is 97%.   Lungs clear to auscultation with some rhonchi auscultated on the right, heart tachycardic, abdomen is soft he is diffusely tender obese, low extremity 3+ edema    Impression: Nausea vomiting mis-transcribed.)    Kiki Fletcher.  Maria Guadalupe Mayberry MD, [facility]  Attending Emergency Medicine Physician        Olaf Licea MD  10/15/18 9745

## 2018-10-15 NOTE — H&P
breakfast, 35 units of NPH with dinner  10 units of Regular insulin with meals  Regular in 3/13/18   Historical Provider, MD   ammonium lactate (AMLACTIN) 12 % cream Apply topically    Historical Provider, MD   hydrochlorothiazide (HYDRODIURIL) 25 MG tablet Take 25 mg by mouth 9/20/18 10/20/18  Historical Provider, MD   pregabalin (LYRICA) 50 MG capsule Take 50 mg by mouth 3 times daily. Deena Marsh Historical Provider, MD   sucralfate (CARAFATE) 1 GM tablet Take 1 tablet by mouth 4 times daily 10/7/18   Turner Rash, APRN - CNP   ondansetron (ZOFRAN ODT) 4 MG disintegrating tablet Take 1 tablet by mouth every 8 hours as needed for Nausea 10/7/18   Turner Rash, APRN - CNP   carvedilol (COREG) 25 MG tablet Take 25 mg by mouth 2 times daily (with meals)    Historical Provider, MD   amLODIPine (NORVASC) 10 MG tablet Take 10 mg by mouth daily    Historical Provider, MD   pantoprazole (PROTONIX) 20 MG tablet Take 20 mg by mouth daily    Historical Provider, MD   atorvastatin (LIPITOR) 10 MG tablet Take by mouth daily    Historical Provider, MD   insulin glargine (LANTUS) 100 UNIT/ML injection vial Inject into the skin nightly    Historical Provider, MD   Insulin Zinc Human (HUMULIN L SC) Inject into the skin    Historical Provider, MD   aspirin 81 MG tablet Take 81 mg by mouth daily    Historical Provider, MD        Allergies:     Nsaids    Social History:     Tobacco:    reports that he has quit smoking. He has never used smokeless tobacco.  Alcohol:      reports that he does not drink alcohol. Drug Use:  reports that he does not use drugs. Family History:     History reviewed. No pertinent family history. Review of Systems:     Positive and Negative as described in HPI. CONSTITUTIONAL:  negative for fevers, chills, sweats, fatigue, weight loss  HEENT:  negative for vision, hearing changes, runny nose, throat pain  RESPIRATORY:  negative for shortness of breath, cough, congestion, wheezing.   CARDIOVASCULAR: Positive for chest pain, palpitations  GASTROINTESTINAL:  Positive for nausea, vomiting, No diarrhea, constipation, change in bowel habits, abdominal pain   GENITOURINARY:  negative for difficulty of urination, burning with urination, frequency   INTEGUMENT:  negative for rash, skin lesions, easy bruising   HEMATOLOGIC/LYMPHATIC:  negative for swelling/edema   ALLERGIC/IMMUNOLOGIC:  negative for urticaria , itching  ENDOCRINE:  negative increase in drinking, increase in urination, hot or cold intolerance  MUSCULOSKELETAL:  negative joint pains, muscle aches, swelling of joints  NEUROLOGICAL:  negative for headaches, dizziness, lightheadedness, numbness, pain, tingling extremities  BEHAVIOR/PSYCH:  negative for depression, anxiety    Physical Exam:   BP (!) 144/75   Pulse 85   Temp 98.2 °F (36.8 °C) (Oral)   Resp 20   Ht 6' 4\" (1.93 m)   Wt (!) 350 lb (158.8 kg)   SpO2 96%   BMI 42.60 kg/m²   Temp (24hrs), Av.4 °F (36.9 °C), Min:98.1 °F (36.7 °C), Max:98.8 °F (37.1 °C)    Recent Labs      10/14/18   2350  10/15/18   1001  10/15/18   1638  10/15/18   2108   POCGLU  365*  399*  455*  473*       Intake/Output Summary (Last 24 hours) at 10/15/18 2317  Last data filed at 10/15/18 2118   Gross per 24 hour   Intake             2000 ml   Output             1400 ml   Net              600 ml       General Appearance:  alert, well appearing, and in no acute distress  Mental status: oriented to person, place, and time with normal affect  Head:  normocephalic, atraumatic. Eye: no icterus, redness, pupils equal and reactive, extraocular eye movements intact, conjunctiva clear  Ear: normal external ear, no discharge, hearing intact  Nose:  no drainage noted  Mouth: mucous membranes moist  Neck: supple, no carotid bruits, thyroid not palpable  Lungs: Bilateral equal air entry, clear to ausculation, no wheezing, rales or rhonchi, normal effort  Cardiovascular: normal rate, regular rhythm, no murmur, gallop, rub.   Abdomen: Temp NOT REPORTED     POC pCO2 Temp NOT REPORTED mm Hg    POC pO2 Temp NOT REPORTED mm Hg   Hemoglobin and hematocrit, blood    Collection Time: 10/14/18 11:50 PM   Result Value Ref Range    POC Hemoglobin 16.7 13.5 - 17.5 g/dL    POC Hematocrit 49 41 - 53 %   Creatinine W/GFR Point of Care    Collection Time: 10/14/18 11:50 PM   Result Value Ref Range    POC Creatinine 2.03 (H) 0.51 - 1.19 mg/dL    GFR Comment 42 (L) >60 mL/min    GFR Non- 34 (L) >60 mL/min    GFR Comment         SODIUM (POC)    Collection Time: 10/14/18 11:50 PM   Result Value Ref Range    POC Sodium 136 (L) 138 - 146 mmol/L   POTASSIUM (POC)    Collection Time: 10/14/18 11:50 PM   Result Value Ref Range    POC Potassium 4.0 3.5 - 4.5 mmol/L   CHLORIDE (POC)    Collection Time: 10/14/18 11:50 PM   Result Value Ref Range    POC Chloride 97 (L) 98 - 107 mmol/L   CALCIUM, IONIC (POC)    Collection Time: 10/14/18 11:50 PM   Result Value Ref Range    POC Ionized Calcium 1.06 (L) 1.15 - 1.33 mmol/L   Lactic Acid, POC    Collection Time: 10/14/18 11:50 PM   Result Value Ref Range    POC Lactic Acid 2.93 (H) 0.56 - 1.39 mmol/L   POCT Glucose    Collection Time: 10/14/18 11:50 PM   Result Value Ref Range    POC Glucose 365 (H) 74 - 100 mg/dL   Anion Gap (Calc) POC    Collection Time: 10/14/18 11:50 PM   Result Value Ref Range    Anion Gap 7 7 - 16 mmol/L   Troponin    Collection Time: 10/15/18  2:21 AM   Result Value Ref Range    Troponin T 2.07 (HH) <0.03 ng/mL    Troponin Interp         TSH with Reflex    Collection Time: 10/15/18  2:21 AM   Result Value Ref Range    TSH 1.82 0.30 - 5.00 mIU/L   Phosphorus    Collection Time: 10/15/18  2:21 AM   Result Value Ref Range    Phosphorus 3.6 2.5 - 4.5 mg/dL   Brain Natriuretic Peptide    Collection Time: 10/15/18  2:21 AM   Result Value Ref Range    Pro-BNP 11,213 (H) <300 pg/mL    BNP Interpretation         Lactic Acid, Whole Blood    Collection Time: 10/15/18  2:21 AM   Result Value Ref Range Lactic Acid, Whole Blood 2.8 (H) 0.7 - 2.1 mmol/L   Urinalysis Reflex to Culture    Collection Time: 10/15/18  3:20 AM   Result Value Ref Range    Color, UA YELLOW YEL    Turbidity UA CLEAR CLEAR    Glucose, Ur 2+ (A) NEG    Bilirubin Urine NEGATIVE NEG    Ketones, Urine NEGATIVE NEG    Specific Gravity, UA 1.014 1.005 - 1.030    Urine Hgb TRACE (A) NEG    pH, UA 5.0 5.0 - 8.0    Protein, UA 2+ (A) NEG    Urobilinogen, Urine Normal NORM    Nitrite, Urine NEGATIVE NEG    Leukocyte Esterase, Urine NEGATIVE NEG    Urinalysis Comments NOT REPORTED    Microscopic Urinalysis    Collection Time: 10/15/18  3:20 AM   Result Value Ref Range    -          WBC, UA 0 TO 2 0 - 5 /HPF    RBC, UA 0 TO 2 0 - 4 /HPF    Casts UA 2 TO 5 HYALINE 0 - 8 /LPF    Crystals UA NOT REPORTED NONE /HPF    Epithelial Cells UA 0 TO 2 0 - 5 /HPF    Renal Epithelial, Urine NOT REPORTED 0 /HPF    Bacteria, UA NOT REPORTED NONE    Mucus, UA NOT REPORTED NONE    Trichomonas, UA NOT REPORTED NONE    Amorphous, UA NOT REPORTED NONE    Other Observations UA NOT REPORTED NREQ    Yeast, UA NOT REPORTED NONE   APTT    Collection Time: 10/15/18  6:25 AM   Result Value Ref Range    PTT 42.1 (H) 20.5 - 30.5 sec   CBC    Collection Time: 10/15/18  6:25 AM   Result Value Ref Range    WBC 9.6 3.5 - 11.3 k/uL    RBC 4.73 4.21 - 5.77 m/uL    Hemoglobin 11.7 (L) 13.0 - 17.0 g/dL    Hematocrit 37.5 (L) 40.7 - 50.3 %    MCV 79.3 (L) 82.6 - 102.9 fL    MCH 24.7 (L) 25.2 - 33.5 pg    MCHC 31.2 28.4 - 34.8 g/dL    RDW 17.1 (H) 11.8 - 14.4 %    Platelets 752 952 - 339 k/uL    MPV 10.7 8.1 - 13.5 fL    NRBC Automated 0.0 0.0 per 100 WBC   BUN & Creatinine    Collection Time: 10/15/18  6:25 AM   Result Value Ref Range    BUN 42 (H) 6 - 20 mg/dL    CREATININE 1.68 (H) 0.70 - 1.20 mg/dL    GFR Non- 43 (L) >60 mL/min    GFR  52 (L) >60 mL/min    GFR Comment          GFR Staging NOT REPORTED    Troponin    Collection Time: 10/15/18  9:57 AM Large right inguinal hernia containing proximal right colon, appendix and  small bowel.  There is associated hydrocele.  No bowel obstruction. 2. Slight wall thickening and pericolonic edema in the intrapelvic portion of  the proximal right colon.  Correlate for possible mild colitis. 3. Small left inguinal hernia containing fat only. CXR:  Impression:        1. Minimal bibasilar opacities suggestive of partial atelectasis or mild  pneumonia  2. Mild cardiomegaly  3. Prominence of the right hilum.  Finding is nonspecific but can be seen  with enlarged hilar lymph nodes.  Consider further assessment with chest CT  as clinically indicated           Assessment :      Primary Problem  Acute coronary syndrome [facility])    Active Hospital Problems    Diagnosis Date Noted    Acute coronary syndrome ([facility] [address] [I24.9] 10/15/2018    SVT (supraventricular tachycardia) (HCC) [I47.1] 10/15/2018    Elevated troponin [R74.8] 10/15/2018    Type 2 diabetes mellitus with diabetic peripheral angiopathy without gangrene, with long-term current use of insulin ([facility] [address] [E11.51, Z79.4] 10/12/2018    HTN (hypertension) [I10] 12/27/2012    Nausea & vomiting [R11.2] 12/27/2012       Plan:     Patient status Admit as inpatient in the  Progressive Unit/Step down    1. Admit  2. Apprec Cardiology eval, needs cath once cleared by Nephrology, ASA, statin  3. Check Echo  4. IVF, mucomyst, monitor Bun/ Cr, avoid nephrotoxins  5. Monitor and control BS, Lantus 40 units BID, SSI, check HbA1C  6. Monitor BP  7.  Gi/ DVT proph    Consultations:   IP CONSULT TO CARDIOLOGY  PHARMACY TO DOSE VANCOMYCIN  IP CONSULT TO HOSPITALIST  IP CONSULT TO CARDIOLOGY  IP CONSULT TO NEPHROLOGY  IP CONSULT TO IV TEAM     Patient is admitted as inpatient status because of co-morbidities listed above, severity of signs and symptoms as outlined, requirement for current medical therapies and most importantly because of direct risk to patient if care not provided in a hospital setting. Erlinda Castro MD  10/15/2018  11:17 PM    Copy sent to Dr. Karron Boast primary care provider on file.

## 2018-10-15 NOTE — ED NOTES
RN to bedside with Yeny Alexandre RN to rate change Heparin after verbal hand off at 0700 am. Patients Heparin drip is going 15 ml/hr, per MAR should be going at 10 ml/hr. Previous RN, Krystin, was contacted about rate, states that it is possible the rate was initiated or adjusted incorrectly when medications were started around 0330 AM. Patients Cardizem drip was noted to be at 10 mg/ hr when Krystin RN states it should be running at 15 mg/hr. The adjustment was possibly made on wrong medication by Krystin RN. Charge nurse is informed. MD Wali Gayle is informed and APTT results are 41.2. Per MD Julian, the drip will be adjusted per protocol still. Yeny Alexandre RN remains in care of patient at this time, VSS at hand off. Yeny Alexandre RN makes adjustments to medications at this time.       Montserrat Roberson RN  10/15/18 [address] [facility] Deejay Obando RN  10/15/18 9571

## 2018-10-15 NOTE — ED NOTES
Hand off report from Krystin RN, patient resting on monitor with nad noted. Patients having increased episodes of low 02 and sleep apnea. Patient intermittently needs to awakened, but does come out of apnea on own. RT is aware of patient and suggests BIPAP to MD [facility] who does not want at this time. VS remain stable.       Asha Arriola RN  10/15/18 [address] Nigel Smith RN  10/15/18 [address] Juanjo Zendejas RN  10/15/18 6105

## 2018-10-15 NOTE — ED PROVIDER NOTES
injection 50 mcg    magnesium sulfate 1 g in dextrose 5% 100 mL IVPB    adenosine (ADENOCARD) 6 MG/2ML injection     Ting Clemente: cabinet override    aspirin chewable tablet 324 mg    heparin (porcine) injection 4,000 Units    heparin (porcine) injection 4,000 Units    heparin (porcine) injection 2,000 Units    heparin 25,000 units in dextrose 5% 250 mL infusion    adenosine (ADENOCARD) 6 MG/2ML injection     CRISTINA STOUT: cabinet override    0.9 % sodium chloride bolus    diltiazem 125 mg in dextrose 5 % 125 mL infusion    adenosine (ADENOCARD) injection 6 mg    adenosine (ADENOCARD) injection 6 mg    diltiazem 50 MG/10ML injection     Ting Clemente: cabinet override    piperacillin-tazobactam (ZOSYN) 3.375 g in dextrose 5 % 50 mL IVPB (mini-bag)    vancomycin (VANCOCIN) intermittent dosing (placeholder)    vancomycin (VANCOCIN) 1500 mg in dextrose 5 % 250 mL IVPB    fentaNYL (SUBLIMAZE) injection 50 mcg    amLODIPine (NORVASC) tablet 10 mg    atorvastatin (LIPITOR) tablet 10 mg    carvedilol (COREG) tablet 25 mg    ferrous sulfate EC tablet 325 mg    hydrochlorothiazide (HYDRODIURIL) tablet 25 mg    pantoprazole (PROTONIX) tablet 20 mg    pregabalin (LYRICA) capsule 50 mg    sucralfate (CARAFATE) tablet 1 g    dextrose 5 % and 0.45 % NaCl with KCl 20 mEq infusion    sodium chloride flush 0.9 % injection 10 mL    sodium chloride flush 0.9 % injection 10 mL    OR Linked Order Group     potassium chloride (KLOR-CON M) extended release tablet 40 mEq     potassium chloride 20 MEQ/15ML (10%) oral solution 40 mEq     potassium chloride 10 mEq/100 mL IVPB (Peripheral Line)    magnesium sulfate 1 g in dextrose 5% 100 mL IVPB    acetaminophen (TYLENOL) tablet 650 mg    OR Linked Order Group     HYDROcodone-acetaminophen (NORCO) 5-325 MG per tablet 1 tablet     HYDROcodone-acetaminophen (NORCO) 5-325 MG per tablet 2 tablet    OR Linked Order Group     morphine (PF) 453 k/uL    MPV 10.7 8.1 - 13.5 fL    NRBC Automated 0.0 0.0 per 100 WBC   Venous Blood Gas, POC   Result Value Ref Range    pH, Matt 7.499 (H) 7.320 - 7.430    pCO2, Matt 40.9 (L) 41.0 - 51.0 mm Hg    pO2, Matt 47.1 30.0 - 50.0 mm Hg    HCO3, Venous 31.8 (H) 22.0 - 29.0 mmol/L    Total CO2, Venous 33 (H) 23.0 - 30.0 mmol/L    Negative Base Excess, Matt NOT REPORTED 0.0 - 2.0    Positive Base Excess, Matt 8 (H) 0.0 - 3.0    O2 Sat, Matt 86 (H) 60.0 - 85.0 %    O2 Device/Flow/% NOT REPORTED     Eliceo Test NOT REPORTED     Sample Site NOT REPORTED     Mode NOT REPORTED     FIO2 NOT REPORTED     Pt Temp NOT REPORTED     POC pH Temp NOT REPORTED     POC pCO2 Temp NOT REPORTED mm Hg    POC pO2 Temp NOT REPORTED mm Hg   Creatinine W/GFR Point of Care   Result Value Ref Range    POC Creatinine 2.03 (H) 0.51 - 1.19 mg/dL    GFR Comment 42 (L) >60 mL/min    GFR Non- 34 (L) >60 mL/min    GFR Comment         Lactic Acid, POC   Result Value Ref Range    POC Lactic Acid 2.93 (H) 0.56 - 1.39 mmol/L   POCT Glucose   Result Value Ref Range    POC Glucose 365 (H) 74 - 100 mg/dL   Anion Gap (Calc) POC   Result Value Ref Range    Anion Gap 7 7 - 16 mmol/L   POCT troponin   Result Value Ref Range    POC Troponin I 3.74 (HH) 0.00 - 0.10 ng/mL    POC Troponin Interp       The Troponin-I (POC) results cannot be compared to the Troponin-T results. IMPRESSION: 78-year-old male comes in with significant tachycardia, initial EKG concerning for V. tach versus SVT with aberrancy. Patient was initially given 6 of adenosine after which he converted to a normal sinus rhythm. Patient troponin was however elevated, concern for an N STEMI. Full cardiac workup was done as well as a Septic workup given the elevated lactic acid as well, including EKG, chest x-ray, 2 sets of troponins, and lactate ×2, blood cultures ×2.       RADIOLOGY:    Ct Abdomen Pelvis Wo Contrast    Result Date: 10/15/2018  EXAMINATION: CT OF THE ABDOMEN AND

## 2018-10-15 NOTE — ED NOTES
Arrives per triage c/o vomiting, dizziness, pale, diaphoretic, placed on monitor  regular. Awake, alert and oriented.       Florinda Bazzi RN  10/14/18 2878

## 2018-10-15 NOTE — ED NOTES
Patients AM labs are sent, pending APTT for rate adjustment on Heparin.       Kofi Phelan RN  10/15/18 7842

## 2018-10-16 PROBLEM — B95.2 UTI (URINARY TRACT INFECTION) DUE TO ENTEROCOCCUS: Status: ACTIVE | Noted: 2018-10-16

## 2018-10-16 PROBLEM — N39.0 UTI (URINARY TRACT INFECTION) DUE TO ENTEROCOCCUS: Status: ACTIVE | Noted: 2018-10-16

## 2018-10-16 PROBLEM — I51.9 SYSTOLIC DYSFUNCTION, LEFT VENTRICLE: Status: ACTIVE | Noted: 2018-10-16

## 2018-10-16 LAB
ALBUMIN SERPL-MCNC: 2.7 G/DL (ref 3.5–5.2)
ALBUMIN/GLOBULIN RATIO: 0.8 (ref 1–2.5)
ALP BLD-CCNC: 89 U/L (ref 40–129)
ALT SERPL-CCNC: 11 U/L (ref 5–41)
ANION GAP SERPL CALCULATED.3IONS-SCNC: 11 MMOL/L (ref 9–17)
AST SERPL-CCNC: 13 U/L
BILIRUB SERPL-MCNC: 0.4 MG/DL (ref 0.3–1.2)
BNP INTERPRETATION: ABNORMAL
BUN BLDV-MCNC: 38 MG/DL (ref 6–20)
BUN/CREAT BLD: ABNORMAL (ref 9–20)
CALCIUM SERPL-MCNC: 8.2 MG/DL (ref 8.6–10.4)
CHLORIDE BLD-SCNC: 97 MMOL/L (ref 98–107)
CHOLESTEROL/HDL RATIO: 4.3
CHOLESTEROL: 112 MG/DL
CO2: 29 MMOL/L (ref 20–31)
CREAT SERPL-MCNC: 1.76 MG/DL (ref 0.7–1.2)
CULTURE: ABNORMAL
GFR AFRICAN AMERICAN: 49 ML/MIN
GFR NON-AFRICAN AMERICAN: 41 ML/MIN
GFR SERPL CREATININE-BSD FRML MDRD: ABNORMAL ML/MIN/{1.73_M2}
GFR SERPL CREATININE-BSD FRML MDRD: ABNORMAL ML/MIN/{1.73_M2}
GLUCOSE BLD-MCNC: 294 MG/DL (ref 75–110)
GLUCOSE BLD-MCNC: 307 MG/DL (ref 75–110)
GLUCOSE BLD-MCNC: 389 MG/DL (ref 75–110)
GLUCOSE BLD-MCNC: 405 MG/DL (ref 75–110)
GLUCOSE BLD-MCNC: 445 MG/DL (ref 70–99)
HCT VFR BLD CALC: 35.8 % (ref 40.7–50.3)
HDLC SERPL-MCNC: 26 MG/DL
HEMOGLOBIN: 10.9 G/DL (ref 13–17)
INR BLD: 1
LDL CHOLESTEROL: 53 MG/DL (ref 0–130)
Lab: ABNORMAL
MCH RBC QN AUTO: 24.7 PG (ref 25.2–33.5)
MCHC RBC AUTO-ENTMCNC: 30.4 G/DL (ref 28.4–34.8)
MCV RBC AUTO: 81 FL (ref 82.6–102.9)
NRBC AUTOMATED: 0 PER 100 WBC
ORGANISM: ABNORMAL
PARTIAL THROMBOPLASTIN TIME: 104.7 SEC (ref 20.5–30.5)
PARTIAL THROMBOPLASTIN TIME: 40.7 SEC (ref 20.5–30.5)
PARTIAL THROMBOPLASTIN TIME: 41.6 SEC (ref 20.5–30.5)
PARTIAL THROMBOPLASTIN TIME: 45.2 SEC (ref 20.5–30.5)
PARTIAL THROMBOPLASTIN TIME: 55.7 SEC (ref 20.5–30.5)
PDW BLD-RTO: 17.2 % (ref 11.8–14.4)
PHOSPHORUS: 4.1 MG/DL (ref 2.5–4.5)
PLATELET # BLD: 333 K/UL (ref 138–453)
PMV BLD AUTO: 10.9 FL (ref 8.1–13.5)
POTASSIUM SERPL-SCNC: 4.2 MMOL/L (ref 3.7–5.3)
PRO-BNP: 5702 PG/ML
PROTHROMBIN TIME: 10.9 SEC (ref 9–12)
PTH INTACT: 89.56 PG/ML (ref 15–65)
RBC # BLD: 4.42 M/UL (ref 4.21–5.77)
SODIUM BLD-SCNC: 137 MMOL/L (ref 135–144)
SPECIMEN DESCRIPTION: ABNORMAL
STATUS: ABNORMAL
TOTAL PROTEIN: 6.1 G/DL (ref 6.4–8.3)
TRIGL SERPL-MCNC: 165 MG/DL
URIC ACID: 8.6 MG/DL (ref 3.4–7)
VANCOMYCIN TROUGH DATE LAST DOSE: NORMAL
VANCOMYCIN TROUGH DOSE AMOUNT: NORMAL
VANCOMYCIN TROUGH TIME LAST DOSE: NORMAL
VANCOMYCIN TROUGH: 19 UG/ML (ref 10–20)
VLDLC SERPL CALC-MCNC: ABNORMAL MG/DL (ref 1–30)
WBC # BLD: 6.7 K/UL (ref 3.5–11.3)

## 2018-10-16 PROCEDURE — 99232 SBSQ HOSP IP/OBS MODERATE 35: CPT | Performed by: INTERNAL MEDICINE

## 2018-10-16 PROCEDURE — 6360000002 HC RX W HCPCS: Performed by: EMERGENCY MEDICINE

## 2018-10-16 PROCEDURE — 2700000000 HC OXYGEN THERAPY PER DAY

## 2018-10-16 PROCEDURE — 83970 ASSAY OF PARATHORMONE: CPT

## 2018-10-16 PROCEDURE — 84100 ASSAY OF PHOSPHORUS: CPT

## 2018-10-16 PROCEDURE — 6360000002 HC RX W HCPCS: Performed by: NURSE PRACTITIONER

## 2018-10-16 PROCEDURE — 36415 COLL VENOUS BLD VENIPUNCTURE: CPT

## 2018-10-16 PROCEDURE — 6370000000 HC RX 637 (ALT 250 FOR IP): Performed by: INTERNAL MEDICINE

## 2018-10-16 PROCEDURE — 80061 LIPID PANEL: CPT

## 2018-10-16 PROCEDURE — 2580000003 HC RX 258: Performed by: EMERGENCY MEDICINE

## 2018-10-16 PROCEDURE — 2060000000 HC ICU INTERMEDIATE R&B

## 2018-10-16 PROCEDURE — 82947 ASSAY GLUCOSE BLOOD QUANT: CPT

## 2018-10-16 PROCEDURE — 85610 PROTHROMBIN TIME: CPT

## 2018-10-16 PROCEDURE — 80053 COMPREHEN METABOLIC PANEL: CPT

## 2018-10-16 PROCEDURE — 83880 ASSAY OF NATRIURETIC PEPTIDE: CPT

## 2018-10-16 PROCEDURE — 6360000002 HC RX W HCPCS: Performed by: INTERNAL MEDICINE

## 2018-10-16 PROCEDURE — 84550 ASSAY OF BLOOD/URIC ACID: CPT

## 2018-10-16 PROCEDURE — 80202 ASSAY OF VANCOMYCIN: CPT

## 2018-10-16 PROCEDURE — 2580000003 HC RX 258: Performed by: INTERNAL MEDICINE

## 2018-10-16 PROCEDURE — 85730 THROMBOPLASTIN TIME PARTIAL: CPT

## 2018-10-16 PROCEDURE — 6370000000 HC RX 637 (ALT 250 FOR IP)

## 2018-10-16 PROCEDURE — 6370000000 HC RX 637 (ALT 250 FOR IP): Performed by: NURSE PRACTITIONER

## 2018-10-16 PROCEDURE — 85027 COMPLETE CBC AUTOMATED: CPT

## 2018-10-16 RX ORDER — ASPIRIN 81 MG/1
TABLET, CHEWABLE ORAL
Status: COMPLETED
Start: 2018-10-16 | End: 2018-10-16

## 2018-10-16 RX ORDER — INSULIN GLARGINE 100 [IU]/ML
25 INJECTION, SOLUTION SUBCUTANEOUS ONCE
Status: COMPLETED | OUTPATIENT
Start: 2018-10-16 | End: 2018-10-16

## 2018-10-16 RX ORDER — METOPROLOL TARTRATE 50 MG/1
50 TABLET, FILM COATED ORAL 2 TIMES DAILY
Status: DISCONTINUED | OUTPATIENT
Start: 2018-10-16 | End: 2018-10-16

## 2018-10-16 RX ORDER — CARVEDILOL 12.5 MG/1
12.5 TABLET ORAL 2 TIMES DAILY WITH MEALS
Status: DISCONTINUED | OUTPATIENT
Start: 2018-10-16 | End: 2018-10-17

## 2018-10-16 RX ORDER — LABETALOL HYDROCHLORIDE 5 MG/ML
10 INJECTION, SOLUTION INTRAVENOUS EVERY 4 HOURS PRN
Status: DISCONTINUED | OUTPATIENT
Start: 2018-10-16 | End: 2018-10-24 | Stop reason: HOSPADM

## 2018-10-16 RX ORDER — PROMETHAZINE HYDROCHLORIDE 25 MG/ML
12.5 INJECTION, SOLUTION INTRAMUSCULAR; INTRAVENOUS EVERY 6 HOURS PRN
Status: DISCONTINUED | OUTPATIENT
Start: 2018-10-16 | End: 2018-10-24 | Stop reason: HOSPADM

## 2018-10-16 RX ORDER — INSULIN GLARGINE 100 [IU]/ML
40 INJECTION, SOLUTION SUBCUTANEOUS 2 TIMES DAILY
Status: DISCONTINUED | OUTPATIENT
Start: 2018-10-16 | End: 2018-10-20

## 2018-10-16 RX ADMIN — INSULIN LISPRO 7 UNITS: 100 INJECTION, SOLUTION INTRAVENOUS; SUBCUTANEOUS at 21:00

## 2018-10-16 RX ADMIN — METOPROLOL TARTRATE 50 MG: 50 TABLET, FILM COATED ORAL at 08:55

## 2018-10-16 RX ADMIN — Medication 1500 MG: at 02:14

## 2018-10-16 RX ADMIN — Medication 600 MG: at 08:51

## 2018-10-16 RX ADMIN — PREGABALIN 50 MG: 50 CAPSULE ORAL at 21:05

## 2018-10-16 RX ADMIN — ONDANSETRON 4 MG: 2 INJECTION INTRAMUSCULAR; INTRAVENOUS at 11:42

## 2018-10-16 RX ADMIN — SUCRALFATE 1 G: 1 TABLET ORAL at 13:45

## 2018-10-16 RX ADMIN — Medication 12 UNITS/KG/HR: at 21:00

## 2018-10-16 RX ADMIN — PREGABALIN 50 MG: 50 CAPSULE ORAL at 08:50

## 2018-10-16 RX ADMIN — ASPIRIN 81 MG 81 MG: 81 TABLET ORAL at 08:54

## 2018-10-16 RX ADMIN — ATORVASTATIN CALCIUM 10 MG: 10 TABLET, FILM COATED ORAL at 08:51

## 2018-10-16 RX ADMIN — Medication 81 MG: at 08:54

## 2018-10-16 RX ADMIN — PREGABALIN 50 MG: 50 CAPSULE ORAL at 13:45

## 2018-10-16 RX ADMIN — SODIUM CHLORIDE: 9 INJECTION, SOLUTION INTRAVENOUS at 02:14

## 2018-10-16 RX ADMIN — SUCRALFATE 1 G: 1 TABLET ORAL at 16:51

## 2018-10-16 RX ADMIN — INSULIN LISPRO 18 UNITS: 100 INJECTION, SOLUTION INTRAVENOUS; SUBCUTANEOUS at 08:58

## 2018-10-16 RX ADMIN — HEPARIN SODIUM 2000 UNITS: 1000 INJECTION, SOLUTION INTRAVENOUS; SUBCUTANEOUS at 21:50

## 2018-10-16 RX ADMIN — HEPARIN SODIUM 2000 UNITS: 1000 INJECTION, SOLUTION INTRAVENOUS; SUBCUTANEOUS at 00:59

## 2018-10-16 RX ADMIN — Medication 600 MG: at 21:05

## 2018-10-16 RX ADMIN — INSULIN GLARGINE 40 UNITS: 100 INJECTION, SOLUTION SUBCUTANEOUS at 21:00

## 2018-10-16 RX ADMIN — CARVEDILOL 12.5 MG: 12.5 TABLET, FILM COATED ORAL at 16:45

## 2018-10-16 RX ADMIN — Medication 14.3 UNITS/KG/HR: at 05:40

## 2018-10-16 RX ADMIN — INSULIN LISPRO 12 UNITS: 100 INJECTION, SOLUTION INTRAVENOUS; SUBCUTANEOUS at 16:45

## 2018-10-16 RX ADMIN — ONDANSETRON 4 MG: 2 INJECTION INTRAMUSCULAR; INTRAVENOUS at 21:15

## 2018-10-16 RX ADMIN — PANTOPRAZOLE 20 MG: 20 TABLET, DELAYED RELEASE ORAL at 08:51

## 2018-10-16 RX ADMIN — FERROUS SULFATE TAB EC 325 MG (65 MG FE EQUIVALENT) 325 MG: 325 (65 FE) TABLET DELAYED RESPONSE at 08:51

## 2018-10-16 RX ADMIN — SUCRALFATE 1 G: 1 TABLET ORAL at 08:50

## 2018-10-16 RX ADMIN — HYDROCODONE BITARTRATE AND ACETAMINOPHEN 2 TABLET: 5; 325 TABLET ORAL at 04:42

## 2018-10-16 RX ADMIN — INSULIN GLARGINE 25 UNITS: 100 INJECTION, SOLUTION SUBCUTANEOUS at 11:46

## 2018-10-16 RX ADMIN — METOPROLOL TARTRATE 50 MG: 25 TABLET ORAL at 08:55

## 2018-10-16 RX ADMIN — SUCRALFATE 1 G: 1 TABLET ORAL at 21:05

## 2018-10-16 RX ADMIN — INSULIN LISPRO 9 UNITS: 100 INJECTION, SOLUTION INTRAVENOUS; SUBCUTANEOUS at 11:47

## 2018-10-16 ASSESSMENT — PAIN SCALES - GENERAL
PAINLEVEL_OUTOF10: 2
PAINLEVEL_OUTOF10: 7
PAINLEVEL_OUTOF10: 2

## 2018-10-16 ASSESSMENT — PAIN DESCRIPTION - PAIN TYPE: TYPE: ACUTE PAIN

## 2018-10-16 ASSESSMENT — PAIN DESCRIPTION - LOCATION: LOCATION: ABDOMEN

## 2018-10-16 NOTE — FLOWSHEET NOTE
Admit from ER. No c/o pain. [email or web address]  Monitor freq APBS  Sinus beats  Cardizem at 10ml  Heparin @8.3U/kg/hr        10:30 11:45   To and from nuclear medicine.   No c/o    Sinus rhythm with occas APBS    Tolerates food, But does c/o occas nausea    Bilat legs swollenRt>lt  3/4 toes missing  Old scabbed vijay from [employer]

## 2018-10-16 NOTE — PROGRESS NOTES
carvedilol  12.5 mg Oral BID     vancomycin (VANCOCIN) intermittent dosing (placeholder)   Other RX Placeholder    vancomycin  1,500 mg Intravenous Q12H    atorvastatin  10 mg Oral Daily    ferrous sulfate  325 mg Oral BID     pantoprazole  20 mg Oral Daily    pregabalin  50 mg Oral TID    sucralfate  1 g Oral 4x Daily    sodium chloride flush  10 mL Intravenous 2 times per day    aspirin  81 mg Oral Daily    acetylcysteine  600 mg Oral BID    insulin glargine  40 Units Subcutaneous Nightly    insulin lispro  0-18 Units Subcutaneous TID     insulin lispro  0-9 Units Subcutaneous Nightly    sodium chloride flush  10 mL Intravenous 2 times per day     Continuous Infusions:    heparin (porcine) 13.2 mL/hr (10/15/18 0709)    heparin (porcine) 12 Units/kg/hr (10/16/18 1217)    dextrose      sodium chloride 100 mL/hr at 10/16/18 0214     PRN Meds:  labetalol, heparin (porcine), heparin (porcine), sodium chloride flush, potassium chloride **OR** potassium chloride **OR** potassium chloride, magnesium sulfate, acetaminophen, HYDROcodone 5 mg - acetaminophen **OR** HYDROcodone 5 mg - acetaminophen, morphine **OR** morphine, magnesium hydroxide, ondansetron, nitroGLYCERIN, heparin (porcine), heparin (porcine), glucose, dextrose, glucagon (rDNA), dextrose, sodium chloride flush, sodium chloride flush    Input/Output:       I/O last 3 completed shifts: In: 5169 [P.O.:1895; I.V.:1249; IV Piggyback:250]  Out: 2877 [Urine:3020].       Patient Vitals for the past 96 hrs (Last 3 readings):   Weight   10/16/18 0446 (!) 347 lb 3.6 oz (157.5 kg)   10/14/18 2335 (!) 350 lb (158.8 kg)       Vital Signs:   Temperature:  Temp: 97.5 °F (36.4 °C)  TMax:   Temp (24hrs), Av.1 °F (36.7 °C), Min:97.5 °F (36.4 °C), Max:98.8 °F (37.1 °C)    Respirations:  Resp: 18  Pulse:   Pulse: 79  BP:    BP: (!) 152/87  BP Range: Systolic (06IOH), XLE:392 , Min:138 , SZD:559       Diastolic (44COI), BWW:00, Min:57, renal dysfunction. 2. Primary treating the hyperglycemia. 3. Monitor BMP in a.m.  4. Cardiac renal function better  5. Mucomyst and gentle fluids before and after cardiac cath due to Low EF. 6. Will follow     Nutrition   Please ensure that patient is on a renal diet/TF. Avoid nephrotoxic drugs/contrast exposure. We will continue to follow along with you. Adal Arce MD  [facility]     This note is created with the assistance of a speech-recognition program. While intending to generate a document that actually reflects the content of the visit, no guarantees can be provided that every mistake has been identified and corrected by editing.

## 2018-10-16 NOTE — PLAN OF CARE
Problem: Cardiac Output - Decreased:  Goal: Hemodynamic stability will improve  Hemodynamic stability will improve  Outcome: Ongoing

## 2018-10-16 NOTE — PLAN OF CARE
Problem: Pain:  Goal: Pain level will decrease  Pain level will decrease   Outcome: Ongoing    Goal: Control of acute pain  Control of acute pain   Outcome: Ongoing    Goal: Control of chronic pain  Control of chronic pain   Outcome: Ongoing      Problem: Cardiac Output - Decreased:  Goal: Hemodynamic stability will improve  Hemodynamic stability will improve   Outcome: Ongoing      Problem: Risk for Falls  Goal: Safety behavior - fall prevention  Patient and caregivers take actions to minimize risk factors that  might lead to falls.      Outcome: Ongoing

## 2018-10-17 ENCOUNTER — APPOINTMENT (OUTPATIENT)
Dept: CARDIAC CATH/INVASIVE PROCEDURES | Age: 53
DRG: 174 | End: 2018-10-17
Payer: COMMERCIAL

## 2018-10-17 ENCOUNTER — APPOINTMENT (OUTPATIENT)
Dept: GENERAL RADIOLOGY | Age: 53
DRG: 174 | End: 2018-10-17
Payer: COMMERCIAL

## 2018-10-17 PROBLEM — I82.402 LEFT LEG DVT (HCC): Status: ACTIVE | Noted: 2018-10-17

## 2018-10-17 LAB
ANION GAP SERPL CALCULATED.3IONS-SCNC: 12 MMOL/L (ref 9–17)
BUN BLDV-MCNC: 30 MG/DL (ref 6–20)
BUN/CREAT BLD: ABNORMAL (ref 9–20)
CALCIUM SERPL-MCNC: 8.4 MG/DL (ref 8.6–10.4)
CHLORIDE BLD-SCNC: 96 MMOL/L (ref 98–107)
CO2: 29 MMOL/L (ref 20–31)
CREAT SERPL-MCNC: 1.46 MG/DL (ref 0.7–1.2)
CULTURE: ABNORMAL
EKG ATRIAL RATE: 23 BPM
EKG ATRIAL RATE: 76 BPM
EKG ATRIAL RATE: 78 BPM
EKG ATRIAL RATE: 90 BPM
EKG ATRIAL RATE: 98 BPM
EKG P AXIS: 58 DEGREES
EKG P AXIS: 59 DEGREES
EKG P AXIS: 66 DEGREES
EKG P AXIS: 68 DEGREES
EKG P-R INTERVAL: 188 MS
EKG P-R INTERVAL: 192 MS
EKG P-R INTERVAL: 200 MS
EKG P-R INTERVAL: 204 MS
EKG Q-T INTERVAL: 312 MS
EKG Q-T INTERVAL: 358 MS
EKG Q-T INTERVAL: 386 MS
EKG Q-T INTERVAL: 388 MS
EKG Q-T INTERVAL: 414 MS
EKG QRS DURATION: 128 MS
EKG QRS DURATION: 136 MS
EKG QRS DURATION: 138 MS
EKG QRS DURATION: 138 MS
EKG QRS DURATION: 142 MS
EKG QTC CALCULATION (BAZETT): 442 MS
EKG QTC CALCULATION (BAZETT): 457 MS
EKG QTC CALCULATION (BAZETT): 465 MS
EKG QTC CALCULATION (BAZETT): 472 MS
EKG QTC CALCULATION (BAZETT): 521 MS
EKG R AXIS: -64 DEGREES
EKG R AXIS: -72 DEGREES
EKG R AXIS: -73 DEGREES
EKG R AXIS: -74 DEGREES
EKG R AXIS: -77 DEGREES
EKG T AXIS: 24 DEGREES
EKG T AXIS: 56 DEGREES
EKG T AXIS: 71 DEGREES
EKG T AXIS: 72 DEGREES
EKG T AXIS: 90 DEGREES
EKG VENTRICULAR RATE: 168 BPM
EKG VENTRICULAR RATE: 76 BPM
EKG VENTRICULAR RATE: 78 BPM
EKG VENTRICULAR RATE: 90 BPM
EKG VENTRICULAR RATE: 98 BPM
GFR AFRICAN AMERICAN: >60 ML/MIN
GFR NON-AFRICAN AMERICAN: 50 ML/MIN
GFR SERPL CREATININE-BSD FRML MDRD: ABNORMAL ML/MIN/{1.73_M2}
GFR SERPL CREATININE-BSD FRML MDRD: ABNORMAL ML/MIN/{1.73_M2}
GLUCOSE BLD-MCNC: 159 MG/DL (ref 75–110)
GLUCOSE BLD-MCNC: 190 MG/DL (ref 75–110)
GLUCOSE BLD-MCNC: 213 MG/DL (ref 75–110)
GLUCOSE BLD-MCNC: 275 MG/DL (ref 70–99)
GLUCOSE BLD-MCNC: 69 MG/DL (ref 75–110)
Lab: ABNORMAL
PARTIAL THROMBOPLASTIN TIME: 37.4 SEC (ref 20.5–30.5)
PARTIAL THROMBOPLASTIN TIME: 69.5 SEC (ref 20.5–30.5)
PARTIAL THROMBOPLASTIN TIME: 82.4 SEC (ref 20.5–30.5)
PLATELET # BLD: 267 K/UL (ref 138–453)
POTASSIUM SERPL-SCNC: 4.3 MMOL/L (ref 3.7–5.3)
SODIUM BLD-SCNC: 137 MMOL/L (ref 135–144)
SPECIMEN DESCRIPTION: ABNORMAL
STATUS: ABNORMAL

## 2018-10-17 PROCEDURE — 6370000000 HC RX 637 (ALT 250 FOR IP): Performed by: INTERNAL MEDICINE

## 2018-10-17 PROCEDURE — 6360000002 HC RX W HCPCS

## 2018-10-17 PROCEDURE — C1769 GUIDE WIRE: HCPCS

## 2018-10-17 PROCEDURE — 6370000000 HC RX 637 (ALT 250 FOR IP): Performed by: NURSE PRACTITIONER

## 2018-10-17 PROCEDURE — C1876 STENT, NON-COA/NON-COV W/DEL: HCPCS

## 2018-10-17 PROCEDURE — 82947 ASSAY GLUCOSE BLOOD QUANT: CPT

## 2018-10-17 PROCEDURE — 6360000004 HC RX CONTRAST MEDICATION

## 2018-10-17 PROCEDURE — 93970 EXTREMITY STUDY: CPT

## 2018-10-17 PROCEDURE — 71046 X-RAY EXAM CHEST 2 VIEWS: CPT

## 2018-10-17 PROCEDURE — 6370000000 HC RX 637 (ALT 250 FOR IP): Performed by: STUDENT IN AN ORGANIZED HEALTH CARE EDUCATION/TRAINING PROGRAM

## 2018-10-17 PROCEDURE — 6360000002 HC RX W HCPCS: Performed by: INTERNAL MEDICINE

## 2018-10-17 PROCEDURE — 02703DZ DILATION OF CORONARY ARTERY, ONE ARTERY WITH INTRALUMINAL DEVICE, PERCUTANEOUS APPROACH: ICD-10-PCS | Performed by: INTERNAL MEDICINE

## 2018-10-17 PROCEDURE — 6370000000 HC RX 637 (ALT 250 FOR IP)

## 2018-10-17 PROCEDURE — C1725 CATH, TRANSLUMIN NON-LASER: HCPCS

## 2018-10-17 PROCEDURE — 2060000000 HC ICU INTERMEDIATE R&B

## 2018-10-17 PROCEDURE — 93458 L HRT ARTERY/VENTRICLE ANGIO: CPT

## 2018-10-17 PROCEDURE — 99232 SBSQ HOSP IP/OBS MODERATE 35: CPT | Performed by: INTERNAL MEDICINE

## 2018-10-17 PROCEDURE — B2111ZZ FLUOROSCOPY OF MULTIPLE CORONARY ARTERIES USING LOW OSMOLAR CONTRAST: ICD-10-PCS | Performed by: INTERNAL MEDICINE

## 2018-10-17 PROCEDURE — 2580000003 HC RX 258: Performed by: NURSE PRACTITIONER

## 2018-10-17 PROCEDURE — 2500000003 HC RX 250 WO HCPCS

## 2018-10-17 PROCEDURE — 2580000003 HC RX 258: Performed by: EMERGENCY MEDICINE

## 2018-10-17 PROCEDURE — 36415 COLL VENOUS BLD VENIPUNCTURE: CPT

## 2018-10-17 PROCEDURE — 51798 US URINE CAPACITY MEASURE: CPT

## 2018-10-17 PROCEDURE — C1887 CATHETER, GUIDING: HCPCS

## 2018-10-17 PROCEDURE — 80048 BASIC METABOLIC PNL TOTAL CA: CPT

## 2018-10-17 PROCEDURE — 2500000003 HC RX 250 WO HCPCS: Performed by: INTERNAL MEDICINE

## 2018-10-17 PROCEDURE — 2580000003 HC RX 258: Performed by: STUDENT IN AN ORGANIZED HEALTH CARE EDUCATION/TRAINING PROGRAM

## 2018-10-17 PROCEDURE — 4A023N7 MEASUREMENT OF CARDIAC SAMPLING AND PRESSURE, LEFT HEART, PERCUTANEOUS APPROACH: ICD-10-PCS | Performed by: INTERNAL MEDICINE

## 2018-10-17 PROCEDURE — C1894 INTRO/SHEATH, NON-LASER: HCPCS

## 2018-10-17 PROCEDURE — 92928 PRQ TCAT PLMT NTRAC ST 1 LES: CPT | Performed by: INTERNAL MEDICINE

## 2018-10-17 PROCEDURE — 2709999900 HC NON-CHARGEABLE SUPPLY

## 2018-10-17 PROCEDURE — 85049 AUTOMATED PLATELET COUNT: CPT

## 2018-10-17 PROCEDURE — 6360000002 HC RX W HCPCS: Performed by: NURSE PRACTITIONER

## 2018-10-17 PROCEDURE — 85730 THROMBOPLASTIN TIME PARTIAL: CPT

## 2018-10-17 PROCEDURE — 94762 N-INVAS EAR/PLS OXIMTRY CONT: CPT

## 2018-10-17 RX ORDER — ACETAMINOPHEN 325 MG/1
650 TABLET ORAL EVERY 4 HOURS PRN
Status: DISCONTINUED | OUTPATIENT
Start: 2018-10-17 | End: 2018-10-24 | Stop reason: HOSPADM

## 2018-10-17 RX ORDER — HEPARIN SODIUM 10000 [USP'U]/100ML
18 INJECTION, SOLUTION INTRAVENOUS CONTINUOUS
Status: DISCONTINUED | OUTPATIENT
Start: 2018-10-17 | End: 2018-10-23

## 2018-10-17 RX ORDER — ISOSORBIDE MONONITRATE 30 MG/1
30 TABLET, EXTENDED RELEASE ORAL DAILY
Status: DISCONTINUED | OUTPATIENT
Start: 2018-10-17 | End: 2018-10-24 | Stop reason: HOSPADM

## 2018-10-17 RX ORDER — LABETALOL HYDROCHLORIDE 5 MG/ML
10 INJECTION, SOLUTION INTRAVENOUS ONCE
Status: COMPLETED | OUTPATIENT
Start: 2018-10-17 | End: 2018-10-17

## 2018-10-17 RX ORDER — CARVEDILOL 25 MG/1
25 TABLET ORAL 2 TIMES DAILY WITH MEALS
Status: DISCONTINUED | OUTPATIENT
Start: 2018-10-17 | End: 2018-10-24 | Stop reason: HOSPADM

## 2018-10-17 RX ORDER — SODIUM CHLORIDE 0.9 % (FLUSH) 0.9 %
10 SYRINGE (ML) INJECTION PRN
Status: DISCONTINUED | OUTPATIENT
Start: 2018-10-17 | End: 2018-10-24 | Stop reason: HOSPADM

## 2018-10-17 RX ORDER — SODIUM CHLORIDE 0.9 % (FLUSH) 0.9 %
10 SYRINGE (ML) INJECTION EVERY 12 HOURS SCHEDULED
Status: DISCONTINUED | OUTPATIENT
Start: 2018-10-17 | End: 2018-10-24 | Stop reason: HOSPADM

## 2018-10-17 RX ORDER — HYDRALAZINE HYDROCHLORIDE 25 MG/1
25 TABLET, FILM COATED ORAL EVERY 8 HOURS SCHEDULED
Status: DISCONTINUED | OUTPATIENT
Start: 2018-10-17 | End: 2018-10-18

## 2018-10-17 RX ORDER — DOCUSATE SODIUM 100 MG/1
100 CAPSULE, LIQUID FILLED ORAL 2 TIMES DAILY
Status: DISCONTINUED | OUTPATIENT
Start: 2018-10-17 | End: 2018-10-18

## 2018-10-17 RX ORDER — ATORVASTATIN CALCIUM 80 MG/1
80 TABLET, FILM COATED ORAL NIGHTLY
Status: DISCONTINUED | OUTPATIENT
Start: 2018-10-17 | End: 2018-10-24 | Stop reason: HOSPADM

## 2018-10-17 RX ORDER — CARVEDILOL 3.12 MG/1
12.5 TABLET ORAL ONCE
Status: COMPLETED | OUTPATIENT
Start: 2018-10-17 | End: 2018-10-17

## 2018-10-17 RX ADMIN — AMPICILLIN SODIUM 500 MG: 500 INJECTION, POWDER, FOR SOLUTION INTRAMUSCULAR; INTRAVENOUS at 14:43

## 2018-10-17 RX ADMIN — SUCRALFATE 1 G: 1 TABLET ORAL at 14:42

## 2018-10-17 RX ADMIN — CARVEDILOL 25 MG: 25 TABLET, FILM COATED ORAL at 23:08

## 2018-10-17 RX ADMIN — Medication 10 ML: at 08:09

## 2018-10-17 RX ADMIN — PREGABALIN 50 MG: 50 CAPSULE ORAL at 20:31

## 2018-10-17 RX ADMIN — SUCRALFATE 1 G: 1 TABLET ORAL at 08:01

## 2018-10-17 RX ADMIN — SUCRALFATE 1 G: 1 TABLET ORAL at 16:34

## 2018-10-17 RX ADMIN — INSULIN GLARGINE 40 UNITS: 100 INJECTION, SOLUTION SUBCUTANEOUS at 08:12

## 2018-10-17 RX ADMIN — SUCRALFATE 1 G: 1 TABLET ORAL at 20:31

## 2018-10-17 RX ADMIN — HYDROCODONE BITARTRATE AND ACETAMINOPHEN 2 TABLET: 5; 325 TABLET ORAL at 22:27

## 2018-10-17 RX ADMIN — ISOSORBIDE MONONITRATE 30 MG: 30 TABLET ORAL at 14:41

## 2018-10-17 RX ADMIN — Medication 81 MG: at 08:01

## 2018-10-17 RX ADMIN — Medication 600 MG: at 08:01

## 2018-10-17 RX ADMIN — INSULIN GLARGINE 40 UNITS: 100 INJECTION, SOLUTION SUBCUTANEOUS at 20:34

## 2018-10-17 RX ADMIN — PREGABALIN 50 MG: 50 CAPSULE ORAL at 14:42

## 2018-10-17 RX ADMIN — Medication 10 ML: at 20:40

## 2018-10-17 RX ADMIN — Medication 14 UNITS/KG/HR: at 10:55

## 2018-10-17 RX ADMIN — HYDRALAZINE HYDROCHLORIDE 25 MG: 25 TABLET, FILM COATED ORAL at 20:31

## 2018-10-17 RX ADMIN — HYDRALAZINE HYDROCHLORIDE 25 MG: 25 TABLET, FILM COATED ORAL at 14:41

## 2018-10-17 RX ADMIN — PANTOPRAZOLE 20 MG: 20 TABLET, DELAYED RELEASE ORAL at 08:01

## 2018-10-17 RX ADMIN — HYDROCODONE BITARTRATE AND ACETAMINOPHEN 2 TABLET: 5; 325 TABLET ORAL at 15:05

## 2018-10-17 RX ADMIN — CARVEDILOL 12.5 MG: 12.5 TABLET, FILM COATED ORAL at 08:01

## 2018-10-17 RX ADMIN — FERROUS SULFATE TAB EC 325 MG (65 MG FE EQUIVALENT) 325 MG: 325 (65 FE) TABLET DELAYED RESPONSE at 08:01

## 2018-10-17 RX ADMIN — INSULIN LISPRO 3 UNITS: 100 INJECTION, SOLUTION INTRAVENOUS; SUBCUTANEOUS at 16:43

## 2018-10-17 RX ADMIN — TICAGRELOR 90 MG: 90 TABLET ORAL at 20:32

## 2018-10-17 RX ADMIN — LABETALOL HYDROCHLORIDE 10 MG: 5 INJECTION, SOLUTION INTRAVENOUS at 16:34

## 2018-10-17 RX ADMIN — LABETALOL HYDROCHLORIDE 10 MG: 5 INJECTION, SOLUTION INTRAVENOUS at 14:40

## 2018-10-17 RX ADMIN — AMPICILLIN SODIUM 500 MG: 500 INJECTION, POWDER, FOR SOLUTION INTRAMUSCULAR; INTRAVENOUS at 07:54

## 2018-10-17 RX ADMIN — DOCUSATE SODIUM 100 MG: 100 CAPSULE, LIQUID FILLED ORAL at 23:08

## 2018-10-17 RX ADMIN — PREGABALIN 50 MG: 50 CAPSULE ORAL at 08:01

## 2018-10-17 RX ADMIN — AMPICILLIN SODIUM 500 MG: 500 INJECTION, POWDER, FOR SOLUTION INTRAMUSCULAR; INTRAVENOUS at 23:47

## 2018-10-17 RX ADMIN — INSULIN LISPRO 2 UNITS: 100 INJECTION, SOLUTION INTRAVENOUS; SUBCUTANEOUS at 20:34

## 2018-10-17 RX ADMIN — ATORVASTATIN CALCIUM 80 MG: 80 TABLET, FILM COATED ORAL at 20:32

## 2018-10-17 RX ADMIN — CARVEDILOL 12.5 MG: 25 TABLET, FILM COATED ORAL at 16:34

## 2018-10-17 RX ADMIN — ATORVASTATIN CALCIUM 10 MG: 10 TABLET, FILM COATED ORAL at 08:01

## 2018-10-17 RX ADMIN — INSULIN LISPRO 9 UNITS: 100 INJECTION, SOLUTION INTRAVENOUS; SUBCUTANEOUS at 08:13

## 2018-10-17 ASSESSMENT — PAIN SCALES - GENERAL
PAINLEVEL_OUTOF10: 0
PAINLEVEL_OUTOF10: 9
PAINLEVEL_OUTOF10: 0
PAINLEVEL_OUTOF10: 0
PAINLEVEL_OUTOF10: 9

## 2018-10-17 NOTE — PROCEDURES
[facility]    CARDIAC CATHETERIZATION    Date:   10/17/2018  Patient name: Aki Haley  Date of admission:  10/14/2018 11:18 PM  MRN:   [id number]  YOB: 1965    Operators:  Primary:     CV Fellow:    Pre Procedure Conscious Sedation Data:    ASA Class:    [] I [x] II [] III [] IV    Mallampati Class:  [] I [x] II [] III [] IV     Indication:  [] STEMI      [] + Stress test  [x] ACS      [] + EKG Changes  [] Non Q MI       [x] Significant Risk Factors  [] Recurrent Angina             [x] Diabetes Mellitus    [] New LBBB      [] Uncontrolled HTN. [x] CHF / Low EF changes     [] Abnormal CTA / Ca Score    Procedure:  Access:  [x] Femoral  [] Radial  artery       [x] Right  [] Left    Procedure: After informed consent was obtained with explanation of the risks and benefits, patient was brought to the cath lab. The access area was prepped and draped in sterile fashion. 1% lidocaine was used for local block. The artery was cannulated with 6  Fr sheath with brisk arterial blood return. The side port was frequently flushed and aspirated with normal saline. Findings:    LAD: has proximal LAD ulcerated plaque with 90% stenosis.      Lesion on Prox LAD: 90% stenosis 16 mm length reduced to 0% by using the Vision Stent 4.0x18mm BMS.        Conclusions:     Successful PTCA/BMS to proximal LAD from left femoral  approach. Catheters unable to reach LM via right radial approach due to brachiocephalic tortuosity. BMS was done because of pending inguinal hernia repair. Recommendation:   Medical therapy as needed.   Risk factor modification.   Routine Post PCI Order with [facility]         History and Risk Factors    [x] Hypertension     [] Family history of CAD  [x] Hyperlipidemia     [] Cerebrovascular Disease   [] Prior MI       [] Peripheral Vascular disease   [] Prior PCI              [x] Diabetes Mellitus    [] Left Main PCI. [] Currently on Dialysis. [] Prior CABG. [] Currently smoker. [] Cardiac Arrest outside of healthcare facility. [] Yes    [x] No        Witnessed     [] Yes   [] No     Arrest after arrival of EMS  [] Yes   [] No     [] Cardiac Arrest at other Facility. [] Yes   [x] No    Pre-Procedure Information. Heart Failure       [x] Yes    [] No        Class  [] I      [] II  [x] III    [] IV. New Diagnosis    [x] Yes  [] No    HF Type      [x] Systolic   [] Diastolic          [] Unknown. Stress Test Performed:   [] Yes    [x] No     Type:     [] Stress Echo   [] Exercise Stress Test (no imaging)      [] Stress Nuclear  [] Stress Imaging     Results   [] Negative   [] Positive        [] Indeterminate  [] Unavailable     If Positive/ Risk / Extent of Ischemia:       [] Low  [] Intermediate         [] High  [] Unavailable      Cardiac CTA Performed:  [] Yes    [x] No      Results   [] CAD   [] Non obstructive CAD      [] No CAD   [] Uncertain      [] Unknown   [] Structural Disease. Pre Procedure Medications: [x] Yes    [] No         [x] ASA  [x] Beta Blockers      [] Nitrate  [] Ca Channel Blockers      [] Ranolazine  [x] Statin       [] Plavix/Others antiplatelets     heparin gtt    Electronically signed by Larry Clemente MD on 10/17/2018 at 4:52 PM    I was present during entire procedure and performed all critical elements of the procedure.     Tab Vargas MD

## 2018-10-17 NOTE — PROGRESS NOTES
Héctor's care. Will follow with you    Electronically signed on 10/17/18 at 11:43 AM by:    Armando Aguilera MD   Fellow, [address] Attending Physician Statement  I have discussed the case of Danielle Monique including pertinent history and exam findings with the resident. I have seen and examined the patient and the key elements of the encounter have been performed by me. I agree with the assessment, plan and orders as documented by the resident With changes made to the note.      Electronically signed by Loyda Wallis MD on 10/17/2018 at 1:00 PM.    [facility]      [phone]

## 2018-10-17 NOTE — PROGRESS NOTES
the skin  aspirin 81 MG tablet, Take 81 mg by mouth daily    Current Medications:     Scheduled Meds:    ampicillin IV  500 mg Intravenous Q6H    carvedilol  25 mg Oral BID WC    hydrALAZINE  25 mg Oral 3 times per day    isosorbide mononitrate  30 mg Oral Daily    insulin glargine  40 Units Subcutaneous BID    atorvastatin  10 mg Oral Daily    ferrous sulfate  325 mg Oral BID WC    pantoprazole  20 mg Oral Daily    pregabalin  50 mg Oral TID    sucralfate  1 g Oral 4x Daily    sodium chloride flush  10 mL Intravenous 2 times per day    aspirin  81 mg Oral Daily    insulin lispro  0-18 Units Subcutaneous TID WC    insulin lispro  0-9 Units Subcutaneous Nightly    sodium chloride flush  10 mL Intravenous 2 times per day     Continuous Infusions:    heparin (porcine) 13.2 mL/hr (10/15/18 0709)    heparin (porcine) 14 Units/kg/hr (10/17/18 1055)    dextrose      sodium chloride 50 mL/hr at 10/17/18 0950     PRN Meds:  labetalol, promethazine, heparin (porcine), heparin (porcine), sodium chloride flush, potassium chloride **OR** potassium chloride **OR** potassium chloride, magnesium sulfate, acetaminophen, HYDROcodone 5 mg - acetaminophen **OR** HYDROcodone 5 mg - acetaminophen, morphine **OR** morphine, magnesium hydroxide, ondansetron, nitroGLYCERIN, heparin (porcine), heparin (porcine), glucose, dextrose, glucagon (rDNA), dextrose, sodium chloride flush, sodium chloride flush    Input/Output:       I/O last 3 completed shifts: In: 2300.5 [P.O.:1270; I.V.:1030.5]  Out: 2560 [Urine:2560].       Patient Vitals for the past 96 hrs (Last 3 readings):   Weight   10/17/18 0600 (!) 348 lb 1.7 oz (157.9 kg)   10/16/18 0446 (!) 347 lb 3.6 oz (157.5 kg)   10/14/18 2335 (!) 350 lb (158.8 kg)       Vital Signs:   Temperature:  Temp: 97.4 °F (36.3 °C)  TMax:   Temp (24hrs), Av °F (36.7 °C), Min:97.4 °F (36.3 °C), Max:98.6 °F (37 °C)    Respirations:  Resp: 20  Pulse:   Pulse: 80  BP:    BP: (!) 156/89  BP

## 2018-10-17 NOTE — PROGRESS NOTES
pregabalin  50 mg Oral TID    sucralfate  1 g Oral 4x Daily    sodium chloride flush  10 mL Intravenous 2 times per day    aspirin  81 mg Oral Daily    acetylcysteine  600 mg Oral BID    insulin lispro  0-18 Units Subcutaneous TID WC    insulin lispro  0-9 Units Subcutaneous Nightly    sodium chloride flush  10 mL Intravenous 2 times per day     Continuous Infusions:    heparin (porcine) 13.2 mL/hr (10/15/18 0709)    heparin (porcine) 14 Units/kg/hr (10/16/18 2147)    dextrose      sodium chloride 100 mL/hr at 10/16/18 0214     PRN Meds: labetalol, heparin (porcine), heparin (porcine), sodium chloride flush, potassium chloride **OR** potassium chloride **OR** potassium chloride, magnesium sulfate, acetaminophen, HYDROcodone 5 mg - acetaminophen **OR** HYDROcodone 5 mg - acetaminophen, morphine **OR** morphine, magnesium hydroxide, ondansetron, nitroGLYCERIN, heparin (porcine), heparin (porcine), glucose, dextrose, glucagon (rDNA), dextrose, sodium chloride flush, sodium chloride flush    Data:     Past Medical History:   has a past medical history of COPD (chronic obstructive pulmonary disease) ([address] Dysphagia; Equinus contracture of right ankle; Foot infection; Hand swelling; HTN (hypertension); Hyperglycemia without ketosis; Hyperkalemia; Leukocytosis; Mixed hyperlipidemia; MRSA infection; Nausea & vomiting; Neutrophilic leukocytosis; Osteomyelitis ([address] Osteomyelitis of foot ([address] Painful orthopaedic hardware [facility]); Scrotum swelling; Septic arthritis ([address] Septic arthritis of wrist, left ([address] Sickle-cell trait ([address] Skin ulcers of foot, bilateral ([address] Steroid-induced hyperglycemia; Streptococcal arthritis of left wrist ([address] Type 2 diabetes mellitus, uncontrolled ([address] Uncontrolled type 2 diabetes mellitus with diabetic nephropathy, with long-term current use of insulin ([address] and Uncontrolled type 2 diabetes mellitus with retinopathy, with long-term current use of insulin ([address]     Social

## 2018-10-17 NOTE — CONSULTS
(PF) injection 2 mg, 2 mg, Intravenous, Q2H PRN **OR** morphine (PF) injection 4 mg, 4 mg, Intravenous, Q2H PRN, OLIVER Lal - CNP    magnesium hydroxide (MILK OF MAGNESIA) 400 MG/5ML suspension 30 mL, 30 mL, Oral, Daily PRN, Florinda Bonilla APRN - CNP    ondansetron (ZOFRAN) injection 4 mg, 4 mg, Intravenous, Q6H PRN, OLIVER Lal - CNP, 4 mg at 10/16/18 2115    nitroGLYCERIN (NITROSTAT) SL tablet 0.4 mg, 0.4 mg, Sublingual, Q5 Min PRN, OLIVER Lal - CNP    glucose (GLUTOSE) 40 % oral gel 15 g, 15 g, Oral, PRN, OLIVER Lal - CNP    dextrose 50 % solution 12.5 g, 12.5 g, Intravenous, PRN, OLIVER Lal - CNP    glucagon (rDNA) injection 1 mg, 1 mg, Intramuscular, PRN, OLIVER Lal - CNP    dextrose 5 % solution, 100 mL/hr, Intravenous, PRN, OLIVER Lal - CNP    sodium chloride flush 0.9 % injection 10 mL, 10 mL, Intravenous, PRN, Akash Goss MD, 10 mL at 10/15/18 1115    aspirin chewable tablet 81 mg, 81 mg, Oral, Daily, Philly Xie MD, 81 mg at 10/17/18 0801    0.9 % sodium chloride infusion, , Intravenous, Continuous, Yue Lane MD, Last Rate: 50 mL/hr at 10/17/18 0950    insulin lispro (HUMALOG) injection vial 0-18 Units, 0-18 Units, Subcutaneous, TID WC, Radha Cunha MD, 3 Units at 10/17/18 1643    insulin lispro (HUMALOG) injection vial 0-9 Units, 0-9 Units, Subcutaneous, Nightly, Rdaha Cunha MD, 7 Units at 10/16/18 2100    sodium chloride flush 0.9 % injection 10 mL, 10 mL, Intravenous, 2 times per day, Akash Goss MD, 10 mL at 10/17/18 0809    sodium chloride flush 0.9 % injection 10 mL, 10 mL, Intravenous, PRN, Akash Goss MD    Allergies:     Allergies   Allergen Reactions    Nsaids        Social History:   Social History     Social History    Marital status: Single     Spouse name: N/A    Number of children: N/A    Years of education: N/A     Occupational History   

## 2018-10-17 NOTE — PROGRESS NOTES
ampicillin IV  500 mg Intravenous Q6H    carvedilol  25 mg Oral BID WC    hydrALAZINE  25 mg Oral 3 times per day    isosorbide mononitrate  30 mg Oral Daily    sodium chloride flush  10 mL Intravenous 2 times per day    atorvastatin  80 mg Oral Nightly    ticagrelor  90 mg Oral BID    docusate sodium  100 mg Oral BID    insulin glargine  40 Units Subcutaneous BID    ferrous sulfate  325 mg Oral BID WC    pantoprazole  20 mg Oral Daily    pregabalin  50 mg Oral TID    sucralfate  1 g Oral 4x Daily    sodium chloride flush  10 mL Intravenous 2 times per day    aspirin  81 mg Oral Daily    insulin lispro  0-18 Units Subcutaneous TID WC    insulin lispro  0-9 Units Subcutaneous Nightly    sodium chloride flush  10 mL Intravenous 2 times per day     Continuous Infusions:    heparin (porcine) 18 Units/kg/hr (10/17/18 2035)    dextrose      sodium chloride 50 mL/hr at 10/17/18 0950     PRN Meds: sodium chloride flush, acetaminophen, labetalol, promethazine, sodium chloride flush, potassium chloride **OR** potassium chloride **OR** potassium chloride, magnesium sulfate, acetaminophen, HYDROcodone 5 mg - acetaminophen **OR** HYDROcodone 5 mg - acetaminophen, morphine **OR** morphine, magnesium hydroxide, ondansetron, nitroGLYCERIN, glucose, dextrose, glucagon (rDNA), dextrose, sodium chloride flush, sodium chloride flush    Data:     Past Medical History:   has a past medical history of COPD (chronic obstructive pulmonary disease) ([facility] Dysphagia; Equinus contracture of right ankle; Foot infection; Hand swelling; HTN (hypertension); Hyperglycemia without ketosis; Hyperkalemia; Leukocytosis; Mixed hyperlipidemia; MRSA infection; Nausea & vomiting; Neutrophilic leukocytosis; Osteomyelitis ([facility] Osteomyelitis of foot ([facility] Painful orthopaedic hardware [facility]); Scrotum swelling; Septic arthritis ([facility] Septic arthritis of wrist, left ([facility] Sickle-cell trait ([facility]  Skin ulcers of foot, bilateral ([facility]

## 2018-10-18 PROBLEM — I25.5 ISCHEMIC CARDIOMYOPATHY: Status: ACTIVE | Noted: 2018-10-18

## 2018-10-18 LAB
ANION GAP SERPL CALCULATED.3IONS-SCNC: 12 MMOL/L (ref 9–17)
BUN BLDV-MCNC: 25 MG/DL (ref 6–20)
BUN/CREAT BLD: ABNORMAL (ref 9–20)
CALCIUM SERPL-MCNC: 8.2 MG/DL (ref 8.6–10.4)
CHLORIDE BLD-SCNC: 100 MMOL/L (ref 98–107)
CO2: 30 MMOL/L (ref 20–31)
CREAT SERPL-MCNC: 1.49 MG/DL (ref 0.7–1.2)
GFR AFRICAN AMERICAN: 60 ML/MIN
GFR NON-AFRICAN AMERICAN: 49 ML/MIN
GFR SERPL CREATININE-BSD FRML MDRD: ABNORMAL ML/MIN/{1.73_M2}
GFR SERPL CREATININE-BSD FRML MDRD: ABNORMAL ML/MIN/{1.73_M2}
GLUCOSE BLD-MCNC: 101 MG/DL (ref 75–110)
GLUCOSE BLD-MCNC: 137 MG/DL (ref 75–110)
GLUCOSE BLD-MCNC: 161 MG/DL (ref 75–110)
GLUCOSE BLD-MCNC: 187 MG/DL (ref 75–110)
GLUCOSE BLD-MCNC: 222 MG/DL (ref 70–99)
GLUCOSE BLD-MCNC: 238 MG/DL (ref 75–110)
HCT VFR BLD CALC: 34.1 % (ref 40.7–50.3)
HEMOGLOBIN: 10.5 G/DL (ref 13–17)
MCH RBC QN AUTO: 24.5 PG (ref 25.2–33.5)
MCHC RBC AUTO-ENTMCNC: 30.8 G/DL (ref 28.4–34.8)
MCV RBC AUTO: 79.5 FL (ref 82.6–102.9)
NRBC AUTOMATED: 0 PER 100 WBC
PARTIAL THROMBOPLASTIN TIME: 113.6 SEC (ref 20.5–30.5)
PARTIAL THROMBOPLASTIN TIME: 84.7 SEC (ref 20.5–30.5)
PARTIAL THROMBOPLASTIN TIME: 90.1 SEC (ref 20.5–30.5)
PDW BLD-RTO: 17.3 % (ref 11.8–14.4)
PLATELET # BLD: 289 K/UL (ref 138–453)
PMV BLD AUTO: 10 FL (ref 8.1–13.5)
POTASSIUM SERPL-SCNC: 4.5 MMOL/L (ref 3.7–5.3)
RBC # BLD: 4.29 M/UL (ref 4.21–5.77)
SODIUM BLD-SCNC: 142 MMOL/L (ref 135–144)
WBC # BLD: 5.5 K/UL (ref 3.5–11.3)

## 2018-10-18 PROCEDURE — 6370000000 HC RX 637 (ALT 250 FOR IP): Performed by: INTERNAL MEDICINE

## 2018-10-18 PROCEDURE — 76937 US GUIDE VASCULAR ACCESS: CPT

## 2018-10-18 PROCEDURE — G8987 SELF CARE CURRENT STATUS: HCPCS

## 2018-10-18 PROCEDURE — 6370000000 HC RX 637 (ALT 250 FOR IP): Performed by: NURSE PRACTITIONER

## 2018-10-18 PROCEDURE — 97535 SELF CARE MNGMENT TRAINING: CPT

## 2018-10-18 PROCEDURE — 99233 SBSQ HOSP IP/OBS HIGH 50: CPT | Performed by: INTERNAL MEDICINE

## 2018-10-18 PROCEDURE — 97162 PT EVAL MOD COMPLEX 30 MIN: CPT

## 2018-10-18 PROCEDURE — 82947 ASSAY GLUCOSE BLOOD QUANT: CPT

## 2018-10-18 PROCEDURE — 6360000002 HC RX W HCPCS: Performed by: INTERNAL MEDICINE

## 2018-10-18 PROCEDURE — G8988 SELF CARE GOAL STATUS: HCPCS

## 2018-10-18 PROCEDURE — 97530 THERAPEUTIC ACTIVITIES: CPT

## 2018-10-18 PROCEDURE — 6370000000 HC RX 637 (ALT 250 FOR IP): Performed by: STUDENT IN AN ORGANIZED HEALTH CARE EDUCATION/TRAINING PROGRAM

## 2018-10-18 PROCEDURE — 85730 THROMBOPLASTIN TIME PARTIAL: CPT

## 2018-10-18 PROCEDURE — 80048 BASIC METABOLIC PNL TOTAL CA: CPT

## 2018-10-18 PROCEDURE — 36415 COLL VENOUS BLD VENIPUNCTURE: CPT

## 2018-10-18 PROCEDURE — 2580000003 HC RX 258: Performed by: STUDENT IN AN ORGANIZED HEALTH CARE EDUCATION/TRAINING PROGRAM

## 2018-10-18 PROCEDURE — G8978 MOBILITY CURRENT STATUS: HCPCS

## 2018-10-18 PROCEDURE — 94762 N-INVAS EAR/PLS OXIMTRY CONT: CPT

## 2018-10-18 PROCEDURE — 85027 COMPLETE CBC AUTOMATED: CPT

## 2018-10-18 PROCEDURE — 2700000000 HC OXYGEN THERAPY PER DAY

## 2018-10-18 PROCEDURE — 6360000002 HC RX W HCPCS: Performed by: NURSE PRACTITIONER

## 2018-10-18 PROCEDURE — 97166 OT EVAL MOD COMPLEX 45 MIN: CPT

## 2018-10-18 PROCEDURE — 2580000003 HC RX 258: Performed by: NURSE PRACTITIONER

## 2018-10-18 PROCEDURE — 2060000000 HC ICU INTERMEDIATE R&B

## 2018-10-18 PROCEDURE — G8979 MOBILITY GOAL STATUS: HCPCS

## 2018-10-18 RX ORDER — HEPARIN SODIUM 1000 [USP'U]/ML
5000 INJECTION, SOLUTION INTRAVENOUS; SUBCUTANEOUS PRN
Status: DISCONTINUED | OUTPATIENT
Start: 2018-10-18 | End: 2018-10-23

## 2018-10-18 RX ORDER — FUROSEMIDE 10 MG/ML
40 INJECTION INTRAMUSCULAR; INTRAVENOUS ONCE
Status: COMPLETED | OUTPATIENT
Start: 2018-10-18 | End: 2018-10-18

## 2018-10-18 RX ORDER — SENNA AND DOCUSATE SODIUM 50; 8.6 MG/1; MG/1
2 TABLET, FILM COATED ORAL 2 TIMES DAILY
Status: DISCONTINUED | OUTPATIENT
Start: 2018-10-18 | End: 2018-10-24 | Stop reason: HOSPADM

## 2018-10-18 RX ORDER — AMLODIPINE BESYLATE 5 MG/1
5 TABLET ORAL DAILY
Status: DISCONTINUED | OUTPATIENT
Start: 2018-10-18 | End: 2018-10-23

## 2018-10-18 RX ORDER — HYDRALAZINE HYDROCHLORIDE 50 MG/1
50 TABLET, FILM COATED ORAL EVERY 8 HOURS SCHEDULED
Status: DISCONTINUED | OUTPATIENT
Start: 2018-10-18 | End: 2018-10-24 | Stop reason: HOSPADM

## 2018-10-18 RX ORDER — WARFARIN SODIUM 5 MG/1
10 TABLET ORAL ONCE
Status: COMPLETED | OUTPATIENT
Start: 2018-10-18 | End: 2018-10-18

## 2018-10-18 RX ORDER — HEPARIN SODIUM 1000 [USP'U]/ML
10000 INJECTION, SOLUTION INTRAVENOUS; SUBCUTANEOUS PRN
Status: DISCONTINUED | OUTPATIENT
Start: 2018-10-18 | End: 2018-10-23

## 2018-10-18 RX ADMIN — PREGABALIN 50 MG: 50 CAPSULE ORAL at 08:20

## 2018-10-18 RX ADMIN — TICAGRELOR 90 MG: 90 TABLET ORAL at 08:21

## 2018-10-18 RX ADMIN — DOCUSATE SODIUM - SENNOSIDES 2 TABLET: 50; 8.6 TABLET, FILM COATED ORAL at 10:37

## 2018-10-18 RX ADMIN — Medication 81 MG: at 08:21

## 2018-10-18 RX ADMIN — SUCRALFATE 1 G: 1 TABLET ORAL at 08:20

## 2018-10-18 RX ADMIN — PREGABALIN 50 MG: 50 CAPSULE ORAL at 14:30

## 2018-10-18 RX ADMIN — ATORVASTATIN CALCIUM 80 MG: 80 TABLET, FILM COATED ORAL at 20:56

## 2018-10-18 RX ADMIN — HYDRALAZINE HYDROCHLORIDE 50 MG: 50 TABLET, FILM COATED ORAL at 10:36

## 2018-10-18 RX ADMIN — SUCRALFATE 1 G: 1 TABLET ORAL at 14:00

## 2018-10-18 RX ADMIN — HYDROCODONE BITARTRATE AND ACETAMINOPHEN 2 TABLET: 5; 325 TABLET ORAL at 15:48

## 2018-10-18 RX ADMIN — WARFARIN SODIUM 10 MG: 5 TABLET ORAL at 21:24

## 2018-10-18 RX ADMIN — PREGABALIN 50 MG: 50 CAPSULE ORAL at 20:56

## 2018-10-18 RX ADMIN — FUROSEMIDE 40 MG: 10 INJECTION, SOLUTION INTRAMUSCULAR; INTRAVENOUS at 12:44

## 2018-10-18 RX ADMIN — PANTOPRAZOLE 20 MG: 20 TABLET, DELAYED RELEASE ORAL at 08:20

## 2018-10-18 RX ADMIN — HYDROCODONE BITARTRATE AND ACETAMINOPHEN 2 TABLET: 5; 325 TABLET ORAL at 19:51

## 2018-10-18 RX ADMIN — INSULIN GLARGINE 40 UNITS: 100 INJECTION, SOLUTION SUBCUTANEOUS at 20:55

## 2018-10-18 RX ADMIN — HYDROCODONE BITARTRATE AND ACETAMINOPHEN 2 TABLET: 5; 325 TABLET ORAL at 10:58

## 2018-10-18 RX ADMIN — AMLODIPINE BESYLATE 5 MG: 5 TABLET ORAL at 10:37

## 2018-10-18 RX ADMIN — Medication 16 UNITS/KG/HR: at 04:44

## 2018-10-18 RX ADMIN — INSULIN GLARGINE 40 UNITS: 100 INJECTION, SOLUTION SUBCUTANEOUS at 09:17

## 2018-10-18 RX ADMIN — Medication 10 ML: at 08:22

## 2018-10-18 RX ADMIN — ISOSORBIDE MONONITRATE 30 MG: 30 TABLET ORAL at 08:21

## 2018-10-18 RX ADMIN — INSULIN LISPRO 6 UNITS: 100 INJECTION, SOLUTION INTRAVENOUS; SUBCUTANEOUS at 07:45

## 2018-10-18 RX ADMIN — CARVEDILOL 25 MG: 25 TABLET, FILM COATED ORAL at 08:20

## 2018-10-18 RX ADMIN — HYDRALAZINE HYDROCHLORIDE 25 MG: 25 TABLET, FILM COATED ORAL at 06:11

## 2018-10-18 RX ADMIN — Medication 13 UNITS/KG/HR: at 15:49

## 2018-10-18 RX ADMIN — AMPICILLIN SODIUM 500 MG: 500 INJECTION, POWDER, FOR SOLUTION INTRAMUSCULAR; INTRAVENOUS at 18:14

## 2018-10-18 RX ADMIN — CARVEDILOL 25 MG: 25 TABLET, FILM COATED ORAL at 16:52

## 2018-10-18 RX ADMIN — AMPICILLIN SODIUM 500 MG: 500 INJECTION, POWDER, FOR SOLUTION INTRAMUSCULAR; INTRAVENOUS at 12:30

## 2018-10-18 RX ADMIN — AMPICILLIN SODIUM 500 MG: 500 INJECTION, POWDER, FOR SOLUTION INTRAMUSCULAR; INTRAVENOUS at 06:09

## 2018-10-18 RX ADMIN — HYDRALAZINE HYDROCHLORIDE 50 MG: 50 TABLET, FILM COATED ORAL at 20:56

## 2018-10-18 RX ADMIN — TICAGRELOR 90 MG: 90 TABLET ORAL at 20:56

## 2018-10-18 RX ADMIN — INSULIN LISPRO 4 UNITS: 100 INJECTION, SOLUTION INTRAVENOUS; SUBCUTANEOUS at 16:52

## 2018-10-18 RX ADMIN — SUCRALFATE 1 G: 1 TABLET ORAL at 20:56

## 2018-10-18 RX ADMIN — DOCUSATE SODIUM 100 MG: 100 CAPSULE, LIQUID FILLED ORAL at 08:20

## 2018-10-18 RX ADMIN — INSULIN LISPRO 6 UNITS: 100 INJECTION, SOLUTION INTRAVENOUS; SUBCUTANEOUS at 12:10

## 2018-10-18 ASSESSMENT — PAIN SCALES - GENERAL
PAINLEVEL_OUTOF10: 8

## 2018-10-18 NOTE — PROGRESS NOTES
Occupational Therapy   Occupational Therapy Initial Assessment  Date: 10/18/2018   Patient Name: Navin Dalton  MRN: [id number]     : 1965    Chief Complaint   Patient presents with    Emesis     started 11am    Palpitations     Date of Service: 10/18/2018    Discharge Recommendations:  Home with assist PRN, Continue to assess pending progress  OT Equipment Recommendations  Equipment Needed: No    Patient Diagnosis(es): The primary encounter diagnosis was NSTEMI (non-ST elevated myocardial infarction) ([facility] A diagnosis of SVT (supraventricular tachycardia) (HCC) was also pertinent to this visit. has a past medical history of COPD (chronic obstructive pulmonary disease) ([address] Dysphagia; Equinus contracture of right ankle; Foot infection; Hand swelling; HTN (hypertension); Hyperglycemia without ketosis; Hyperkalemia; Leukocytosis; Mixed hyperlipidemia; MRSA infection; Nausea & vomiting; Neutrophilic leukocytosis; Osteomyelitis ([address] Osteomyelitis of foot ([address] Painful orthopaedic hardware [facility]); Scrotum swelling; Septic arthritis ([address] Septic arthritis of wrist, left ([address] Sickle-cell trait ([address] Skin ulcers of foot, bilateral ([address] Steroid-induced hyperglycemia; Streptococcal arthritis of left wrist ([address] Type 2 diabetes mellitus, uncontrolled ([address] Uncontrolled type 2 diabetes mellitus with diabetic nephropathy, with long-term current use of insulin ([address] and Uncontrolled type 2 diabetes mellitus with retinopathy, with long-term current use of insulin ([address] has a past surgical history that includes amputation; amputation; and Cardiac surgery.      Restrictions  Restrictions/Precautions  Restrictions/Precautions: General Precautions  Required Braces or Orthoses?: No    Subjective   General  Patient assessed for rehabilitation services?: Yes  Family / Caregiver Present: No  Pain Assessment  Patient Currently in Pain: Denies  Pain Assessment: 0-10  Pain Level: 8  Pain Type: Acute pain  Pain assistance  LE Bathing: Moderate assistance  UE Dressing: Stand by assistance  LE Dressing: Moderate assistance  Toileting: Minimal assistance  Additional Comments: Pt seated EOB on arrival. Pt completed sit > stand transfer and functional mobility into babcock. Pt demo slight SOB with increased activity. Pt required 1x rest break d/t dizziness/ SOB. Pt returned to chair, call light in reach and RN notified on therapist exit. Tone RUE  RUE Tone: Normotonic  Tone LUE  LUE Tone: Normotonic  Coordination  Movements Are Fluid And Coordinated: Yes     Bed mobility  Comment: Pt seated at EOB on arrival, returned to chair on exit      Transfers  Stand Step Transfers: Contact guard assistance  Sit to stand: Contact guard assistance  Stand to sit: Contact guard assistance  Transfer Comments: No LOb. Slight SOB on increased activity      Cognition  Overall Cognitive Status: WFL     Perception  Overall Perceptual Status: WFL     Sensation  Overall Sensation Status: WFL (Pt denies numbness/ tingling )    LUE AROM (degrees)  LUE AROM : WFL  RUE AROM (degrees)  RUE AROM : WFL  LUE Strength  Gross LUE Strength: WFL  RUE Strength  Gross RUE Strength: WFL    Assessment   Performance deficits / Impairments: Decreased functional mobility ; Decreased endurance;Decreased ADL status; Decreased high-level IADLs  Assessment: Pt would benefit from continued acute care OT to address moderate defiticits in ADL/ functional activities, endurance and functional mobility following admission  Prognosis: Good  Decision Making: Medium Complexity  Patient Education: OT POC, discharge planning, safety   REQUIRES OT FOLLOW UP: Yes  Activity Tolerance  Activity Tolerance: Patient limited by fatigue  Activity Tolerance: SOB with increased activity  Safety Devices  Safety Devices in place: Yes  Type of devices: Left in chair;Nurse notified;Call light within reach;Gait belt  Restraints  Initially in place: No         Plan   Plan  Times per week: 4-5x/wk 24 57.54 7.36 0%     Goals  Short term goals  Time Frame for Short term goals: by discharge, pt will  Short term goal 1: demo I in UE ADL activities   Short term goal 2: demo min A/MI in LE ADL activities with AD as needed  Short term goal 3: demo understanding and I use of EC/WS, fall prevention and proper pursed lipped breathing tech during functional activities   Short term goal 4: demo I in functional transfers/ mobility to assist with ADL/ functional activities   Short term goal 5: demo understanding and I use of safe transfer tech during functional activities with LRD to assist with ADL/ functional activities  Short term goal 6: demo increased activity tolerance of 30+ min to assist with ADL/ functional activities   Patient Goals   Patient goals : to go home    Therapy Time   Individual Concurrent Group Co-treatment   Time In 0840         Time Out 0905         Minutes 25          See above for LOF. RN reports patient is medically stable for therapy treatment this date. Chart reviewed prior to treatment and patient is agreeable for therapy. All lines intact and patient positioned comfortably at end of treatment. All patient needs addressed prior to ending therapy session.        Co-evaluation with PT  Elsa Jones OTR/L

## 2018-10-18 NOTE — PROGRESS NOTES
([facility] 3/9/2018    Uncontrolled type 2 diabetes mellitus with retinopathy, with long-term current use of insulin ([facility] [address] 3/9/2018                Recent Labs      10/16/18   0535   INR  1.0     Recent Labs      10/16/18   0535  10/17/18   0406  10/18/18   0244   HGB  10.9*   --   10.5*   HCT  35.8*   --   34.1*   PLT  333  267  289       Current warfarin drug-drug interactions: Heparin gtt, aspirin, ticagrelor      Date             INR        Dose   10/18/2018      1.0    10 mg    Daily PT/INR while inpatient. Thank you for the consult. Will continue to follow.     Gaby Flanagan PharmD, BCPS 10/18/2018 6:14 PM

## 2018-10-18 NOTE — CONSULTS
return to previous insulin doses:  50 units of NPH with breakfast, 35 units of NPH with dinner  10 units of Regular insulin with meals  Regular in 3/13/18   Historical Provider, MD   ammonium lactate (AMLACTIN) 12 % cream Apply topically    Historical Provider, MD   hydrochlorothiazide (HYDRODIURIL) 25 MG tablet Take 25 mg by mouth 9/20/18 10/20/18  Historical Provider, MD   pregabalin (LYRICA) 50 MG capsule Take 50 mg by mouth 3 times daily. [facility]     Historical Provider, MD   sucralfate (CARAFATE) 1 GM tablet Take 1 tablet by mouth 4 times daily 10/7/18   OLIVER Kim CNP   ondansetron (ZOFRAN ODT) 4 MG disintegrating tablet Take 1 tablet by mouth every 8 hours as needed for Nausea 10/7/18   OLIVER Kim CNP   carvedilol (COREG) 25 MG tablet Take 25 mg by mouth 2 times daily (with meals)    Historical Provider, MD   amLODIPine (NORVASC) 10 MG tablet Take 10 mg by mouth daily    Historical Provider, MD   pantoprazole (PROTONIX) 20 MG tablet Take 20 mg by mouth daily    Historical Provider, MD   atorvastatin (LIPITOR) 10 MG tablet Take by mouth daily    Historical Provider, MD   insulin glargine (LANTUS) 100 UNIT/ML injection vial Inject into the skin nightly    Historical Provider, MD   Insulin Zinc Human (HUMULIN L SC) Inject into the skin    Historical Provider, MD   aspirin 81 MG tablet Take 81 mg by mouth daily    Historical Provider, MD    Scheduled Meds:   ampicillin IV  500 mg Intravenous Q6H    carvedilol  25 mg Oral BID WC    hydrALAZINE  25 mg Oral 3 times per day    isosorbide mononitrate  30 mg Oral Daily    sodium chloride flush  10 mL Intravenous 2 times per day    atorvastatin  80 mg Oral Nightly    ticagrelor  90 mg Oral BID    docusate sodium  100 mg Oral BID    insulin glargine  40 Units Subcutaneous BID    ferrous sulfate  325 mg Oral BID WC    pantoprazole  20 mg Oral Daily    pregabalin  50 mg Oral TID    sucralfate  1 g Oral 4x Daily    sodium chloride flush  10 mL Intravenous 2 times per day    aspirin  81 mg Oral Daily    insulin lispro  0-18 Units Subcutaneous TID WC    insulin lispro  0-9 Units Subcutaneous Nightly    sodium chloride flush  10 mL Intravenous 2 times per day     Continuous Infusions:   heparin (porcine) 16 Units/kg/hr (10/18/18 0444)    dextrose      sodium chloride Stopped (10/17/18 2000)     PRN Meds:.heparin (porcine), heparin (porcine), sodium chloride flush, acetaminophen, labetalol, promethazine, sodium chloride flush, potassium chloride **OR** potassium chloride **OR** potassium chloride, magnesium sulfate, acetaminophen, HYDROcodone 5 mg - acetaminophen **OR** HYDROcodone 5 mg - acetaminophen, morphine **OR** morphine, magnesium hydroxide, ondansetron, nitroGLYCERIN, glucose, dextrose, glucagon (rDNA), dextrose, sodium chloride flush, sodium chloride flush  Allergies  is allergic to nsaids. Family History  family history includes No Known Problems in his father, mother, and sister. Social History   reports that he quit smoking about 9 months ago. He has never used smokeless tobacco.   reports that he does not drink alcohol. reports that he does not use drugs. Review of Systems  General negative  Denies any fever or chills  HEENT negative  Denies any diplopia, tinnitus or vertigo  Resp negative  Denies any shortness of breath, cough or wheezing  Cardiac negative  Denies any chest pain, palpitations, claudication or edema  GI see HPI   Denies any frequency, urgency, hesitancy or incontinence,  + scrotal swelling  Heme Denies bruising or bleeding easily  Endocrine: negative   Neuro negative    PHYSICAL:   VITALS:  height is 6' 4\" (1.93 m) and weight is 348 lb 1.7 oz (157.9 kg) (abnormal). His oral temperature is 98.2 °F (36.8 °C). His blood pressure is 141/69 (abnormal) and his pulse is 71. His respiration is 15 and oxygen saturation is 97%.    CONSTITUTIONAL: awake, alert, cooperative, no apparent distress, and appears stated age and Differential:    Lab Results   Component Value Date    WBC 5.5 10/18/2018    RBC 4.29 10/18/2018    HGB 10.5 10/18/2018    HCT 34.1 10/18/2018     10/18/2018    MCV 79.5 10/18/2018    MCH 24.5 10/18/2018    MCHC 30.8 10/18/2018    RDW 17.3 10/18/2018    LYMPHOPCT 19 10/14/2018    MONOPCT 12 10/14/2018    BASOPCT 0 10/14/2018    MONOSABS 1.34 10/14/2018    LYMPHSABS 2.15 10/14/2018    EOSABS 0.43 10/14/2018    BASOSABS 0.03 10/14/2018    DIFFTYPE NOT REPORTED 10/14/2018     CMP:    Lab Results   Component Value Date     10/18/2018    K 4.5 10/18/2018     10/18/2018    CO2 30 10/18/2018    BUN 25 10/18/2018    CREATININE 1.49 10/18/2018    GFRAA 60 10/18/2018    LABGLOM 49 10/18/2018    GLUCOSE 222 10/18/2018    PROT 6.1 10/16/2018    LABALBU 2.7 10/16/2018    CALCIUM 8.2 10/18/2018    BILITOT 0.40 10/16/2018    ALKPHOS 89 10/16/2018    AST 13 10/16/2018    ALT 11 10/16/2018       RADIOLOGY:   CT scan abd/pelvis:   1. Large right inguinal hernia containing proximal right colon, appendix and   small bowel.  There is associated hydrocele.  No bowel obstruction. 2. Slight wall thickening and pericolonic edema in the intrapelvic portion of   the proximal right colon.  Correlate for possible mild colitis. 3. Small left inguinal hernia containing fat only. Thank you for the interesting evaluation. Further recommendations to follow. Noah Green DO  10/18/18, 6:04 AM       Trauma, Emergency and Critical Surgical Services  Attending Note      I have reviewed the above TECSS note(s) and confirmed the key elements of the medical history and physical exam. I have discussed the findings, established the care plan and recommendations with Resident, TECSS RN, bedside nurse. Patient seen and examined by me with trauma team members. Patient with reji standing rt groin hernia. Has seen physicians in the past to contemplate repair. Large amount small as well as large bowel. No signs of obstruction.

## 2018-10-18 NOTE — PROGRESS NOTES
Julee Velasco, [address] [facility], [address], [place]  Urology Progress Note     Subjective: no acute urologic events. Patient Vitals for the past 24 hrs:   BP Temp Temp src Pulse Resp SpO2   10/18/18 0356 (!) 141/69 98.2 °F (36.8 °C) Oral 71 15 97 %   10/17/18 2309 (!) 151/94 98.4 °F (36.9 °C) Oral 82 18 99 %   10/17/18 2031 (!) 145/95 - - - - -   10/17/18 2000 (!) 145/95 98.6 °F (37 °C) Oral 71 17 100 %   10/17/18 1800 (!) 137/23 - - 77 - -   10/17/18 1700 126/74 - - 72 - -   10/17/18 1634 (!) 162/92 - - 73 - -   10/17/18 1605 - - - - - 100 %   10/17/18 1600 (!) 175/85 - - 71 - 99 %   10/17/18 1530 (!) 158/90 - - 73 - -   10/17/18 1500 (!) 148/72 - - 71 - 100 %   10/17/18 1445 (!) 170/94 - - 73 - 96 %   10/17/18 1441 (!) 215/123 - - - - -   10/17/18 1430 (!) 215/123 97.1 °F (36.2 °C) Oral 71 18 94 %   10/17/18 0801 (!) 156/89 - - 80 - -   10/17/18 0750 - - - - - 95 %   10/17/18 0707 - 97.4 °F (36.3 °C) Oral - 20 95 %       Intake/Output Summary (Last 24 hours) at 10/18/18 0610  Last data filed at 10/18/18 0400   Gross per 24 hour   Intake             1390 ml   Output             1650 ml   Net             -260 ml       Recent Labs      10/15/18   0625  10/16/18   0535  10/17/18   0406  10/18/18   0244   WBC  9.6  6.7   --   5.5   HGB  11.7*  10.9*   --   10.5*   HCT  37.5*  35.8*   --   34.1*   MCV  79.3*  81.0*   --   79.5*   PLT  334  333  267  289     Recent Labs      10/16/18   0535  10/17/18   0406  10/18/18   0244   NA  137  137  142   K  4.2  4.3  4.5   CL  97*  96*  100   CO2  29  29  30   PHOS  4.1   --    --    BUN  38*  30*  25*   CREATININE  1.76*  1.46*  1.49*       No results for input(s): COLORU, PHUR, LABCAST, WBCUA, RBCUA, MUCUS, TRICHOMONAS, YEAST, BACTERIA, CLARITYU, SPECGRAV, LEUKOCYTESUR, UROBILINOGEN, BILIRUBINUR, BLOODU in the last 72 hours.     Invalid input(s): NITRATE, GLUCOSEUKETONESUAMORPHOUS    Additional Lab/culture results:  None    Physical Exam:   AAOx3  NAD  Unlabored

## 2018-10-18 NOTE — PROGRESS NOTES
NA  137  137  142   K  4.2  4.3  4.5   CL  97*  96*  100   CO2  29  29  30   BUN  38*  30*  25*   CREATININE  1.76*  1.46*  1.49*   GLUCOSE  445*  275*  222*     Hepatic:   Recent Labs      10/16/18   0535   AST  13   ALT  11   BILITOT  0.40   ALKPHOS  89     Lipids:   Recent Labs      10/16/18   0535   CHOL  112   HDL  26*     INR:   Recent Labs      10/16/18   0535   INR  1.0       Objective:   Vitals: BP (!) 114/58   Pulse 74   Temp 97.6 °F (36.4 °C) (Oral)   Resp 15   Ht 6' 4\" (1.93 m)   Wt (!) 348 lb 1.7 oz (157.9 kg)   SpO2 98%   BMI 42.37 kg/m²   General appearance: Alert. No acute distress. HEENT: Head: Normal, normocephalic, atraumatic. Neck: Supple, no carotid bruit, no JVD, trachea midline  Lungs: Bibasilar crackles  Heart: Regular rate and rhythm, S1, S2 normal, no murmur, click, rub or gallop  Abdomen: Soft, non-tender; bowel sounds normal  Extremities: No cyanosis or edema. Left groin without hematoma  Groin: Large scrotal swelling  Neurologic: No focal neurologic deficits  Mental status: Alert, oriented, mood appropriate    EKG:   NSR, RBBB, LAE, inferior and anteroseptal infarct. ECHO: 10/15/2018   Moderate LV systolic dysfunction due to multiple segmental wall motion abnormalities as below. Estimated LVEF 35-40%. CATH: 10/17/18  LMCA: Normal 0% stenosis. LAD: Proximal 90% eccentric stenosis with ulcerated plaque s/p PTCA-BMS    LCx: Mid 70% stenosis at bifurcation of large OM which is normal    RCA: Proximal 60-70% stenosis  RPDA/RPL are small with mild disease    Ramus: Small with 20-30% stenosis    Summary  1. Multivessel CAD  2. High grade stenosis in LAD with ulcerated plaque (culprit)  3. Not performed due to renal insufficiency. LVEDP is elevated at 22 mmHg.     Recommendations  S/P BMS to LAD  Plan for PCI of proximal LAD with BMS and re-evaluate LCX/RCA lesion as OP with perfusion scan due to upcoming hernia surgery      Assessment:   1.  Multivessel CAD s/p BMS to LAD (10/18)  2. NSTEMI (Trop 2.45 -> 2.07 -> 2.14 -> 1.91)  3. SVT (likely AVNRT) - resolved  4. LLE DVT - likely secondary to immobility  5. Ischemic Cardiomyopathy - EF 35-40%  6. Acute on chronic systolic CHF  7. Frequent PVCs  8. Large scrotal edema secondary to history of right inguinal hernia  9. Nausea/vomiting  10. HTN  11. ALLISON on CKD-3  12. DM-2, insulin-dependent  13. Morbid obesity      Plan / Recommendations:   1. On ASA 81, Brilinta 90 mg BID, Lipitor 10, Coreg 25 bid, Hydralazine 25 tid, Imdur 30  2. On Heparin gtt  3. BP still elevated so will add Norvasc 5 qday and increase Hydralazine to 50 tid  4. Nephro input appreciated  5. Lasix IV 40 mg once if ok with Nephro  6. Monitor closely as patient is high-risk for fluid overload given low EF  7. Keep Mag > 2.0 and K > 4.0    Thank you for allowing us to participate in [address] [place] [address]. Will follow with you    Electronically signed on 10/18/18 at 2:26 PM by:    Jyoti Carpio MD   Fellow, [address] Attending Physician Statement  I have discussed the case of Kiki Kc including pertinent history and exam findings with the resident. I have seen and examined the patient and the key elements of the encounter have been performed by me. I agree with the assessment, plan and orders as documented by the resident With changes made to the note.      Electronically signed by Martha Nassar MD on 10/18/2018 at 3:57 PM.    [facility]      [phone]

## 2018-10-18 NOTE — CARE COORDINATION
Met with patient to discuss transitional planning. Plan is home with cousin.   PS Dr. Az Irene for  consult per patient request for handicapped housing resources

## 2018-10-18 NOTE — PROGRESS NOTES
1  Surface: level tile  Device: Rolling Walker  Assistance: Contact guard assistance  Distance: 150 ft   Took pt off O2 and his sats did not drop. During amb, pt reported feeling dizzy so he stopped and took a few deep breaths. Pt returned to room and sat in chair and his O2 was at 83, rebounded back to 93 in about 10 seconds and oxygen was re-administered. Stairs/Curb  Stairs?: No     Balance  Sitting - Static: Good  Sitting - Dynamic: Good  Standing - Static: Good;-  Standing - Dynamic: Good;-      Exercises:  B LE knee extension and ankle AROM 10x  B  LE hamstring curls with yellow Tband 15x    Assessment   Body structures, Functions, Activity limitations: Decreased functional mobility ; Decreased strength;Decreased balance;Decreased endurance  Decision Making: Medium Complexity  Patient Education: PT POC, exercises  REQUIRES PT FOLLOW UP: Yes  Activity Tolerance  Activity Tolerance: Patient limited by fatigue;Patient Tolerated treatment well         Plan   Plan  Times per week: 5x/week  Current Treatment Recommendations: Strengthening, Balance Training, Gait Training, Home Exercise Program, Endurance Training, Safety Education & Training  Safety Devices  Type of devices: Left in chair, Call light within reach, Patient at risk for falls, Nurse notified    G-Code  PT G-Codes  Functional Assessment Tool Used: AM-PAC without stairs  Score: 17  Functional Limitation: Mobility: Walking and moving around  Mobility: Walking and Moving Around Current Status (): At least 20 percent but less than 40 percent impaired, limited or restricted  Mobility: Walking and Moving Around Goal Status ():  At least 1 percent but less than 20 percent impaired, limited or restricted    AM-PAC Score     AM-PAC Inpatient Mobility without Stair Climbing Raw Score : 17  AM-PAC Inpatient without Stair Climbing T-Scale Score : 48.47  Mobility Inpatient CMS 0-100% Score: 32.72  Mobility Inpatient without Stair CMS G-Code Modifier : CARMENCITA

## 2018-10-18 NOTE — PROGRESS NOTES
[address] [facility]    Progress Note    10/18/2018     Name:   Marcela Rico  MRN:     [id number]     Acct:      [de-identified]   Room:   [address] [place] [address] Day:  3  Admit Date:  10/14/2018 11:18 PM    PCP:   No primary care provider on file. Code Status:  Full Code    Subjective:     C/C:   Chief Complaint   Patient presents with    Emesis     started 11am    Palpitations     Interval History Status: not changed. Patient continues to have dull pain in scrotum. Afebrile, indicates is getting short of breath even with minimal walking. But at baseline also ambulates only a few steps as ' setup at home is so everything is within easy reach'. Brief History:   46 y/o presented with N/V/abdominal pain. Found to be in SVT requiring adenosine. +significant family h/o CAD. W/u:  ALLISON Cr:1.6 ,  trops>3, cath 10/17: showed Multivessel CAD and underwent BMS placement to LAD. Urology and [facility] consulted for large inguina hernia with multiple bowel loops . Found to have Left gastrocnemius DVT, started on heparin. Review of Systems:     Constitutional:  negative for chills, fevers, sweats  Respiratory:  negative for cough, dyspnea on exertion, hemoptysis, shortness of breath, wheezing  Cardiovascular:  negative for chest pain, chest pressure/discomfort, lower extremity edema, palpitations  Gastrointestinal:  negative for abdominal pain, constipation, diarrhea, nausea, vomiting  Neurological:  negative for dizziness, headache    Medications: Allergies:     Allergies   Allergen Reactions    Nsaids        Current Meds:   Scheduled Meds:    hydrALAZINE  50 mg Oral 3 times per day    amLODIPine  5 mg Oral Daily    sennosides-docusate sodium  2 tablet Oral BID    warfarin (COUMADIN) daily dosing (placeholder)   Other RX Placeholder    warfarin  10 mg Oral Once    ampicillin IV  500 mg Intravenous Q6H    carvedilol  25 mg Oral BID    

## 2018-10-19 PROBLEM — F41.9 ANXIETY: Status: ACTIVE | Noted: 2018-10-19

## 2018-10-19 LAB
ANION GAP SERPL CALCULATED.3IONS-SCNC: 12 MMOL/L (ref 9–17)
BUN BLDV-MCNC: 31 MG/DL (ref 6–20)
BUN/CREAT BLD: ABNORMAL (ref 9–20)
CALCIUM SERPL-MCNC: 8.5 MG/DL (ref 8.6–10.4)
CHLORIDE BLD-SCNC: 94 MMOL/L (ref 98–107)
CO2: 31 MMOL/L (ref 20–31)
CREAT SERPL-MCNC: 2.1 MG/DL (ref 0.7–1.2)
GFR AFRICAN AMERICAN: 40 ML/MIN
GFR NON-AFRICAN AMERICAN: 33 ML/MIN
GFR SERPL CREATININE-BSD FRML MDRD: ABNORMAL ML/MIN/{1.73_M2}
GFR SERPL CREATININE-BSD FRML MDRD: ABNORMAL ML/MIN/{1.73_M2}
GLUCOSE BLD-MCNC: 81 MG/DL (ref 70–99)
GLUCOSE BLD-MCNC: 82 MG/DL (ref 75–110)
GLUCOSE BLD-MCNC: 83 MG/DL (ref 75–110)
GLUCOSE BLD-MCNC: 83 MG/DL (ref 75–110)
INR BLD: 0.9
PARTIAL THROMBOPLASTIN TIME: 49.2 SEC (ref 20.5–30.5)
PARTIAL THROMBOPLASTIN TIME: 54.9 SEC (ref 20.5–30.5)
PARTIAL THROMBOPLASTIN TIME: 81.8 SEC (ref 20.5–30.5)
PLATELET # BLD: 328 K/UL (ref 138–453)
POTASSIUM SERPL-SCNC: 4.3 MMOL/L (ref 3.7–5.3)
PROTHROMBIN TIME: 10.1 SEC (ref 9–12)
SODIUM BLD-SCNC: 137 MMOL/L (ref 135–144)

## 2018-10-19 PROCEDURE — 84132 ASSAY OF SERUM POTASSIUM: CPT

## 2018-10-19 PROCEDURE — 85049 AUTOMATED PLATELET COUNT: CPT

## 2018-10-19 PROCEDURE — 36415 COLL VENOUS BLD VENIPUNCTURE: CPT

## 2018-10-19 PROCEDURE — 6370000000 HC RX 637 (ALT 250 FOR IP): Performed by: INTERNAL MEDICINE

## 2018-10-19 PROCEDURE — 82947 ASSAY GLUCOSE BLOOD QUANT: CPT

## 2018-10-19 PROCEDURE — 2580000003 HC RX 258: Performed by: STUDENT IN AN ORGANIZED HEALTH CARE EDUCATION/TRAINING PROGRAM

## 2018-10-19 PROCEDURE — 6360000002 HC RX W HCPCS: Performed by: INTERNAL MEDICINE

## 2018-10-19 PROCEDURE — 97535 SELF CARE MNGMENT TRAINING: CPT

## 2018-10-19 PROCEDURE — 2580000003 HC RX 258: Performed by: NURSE PRACTITIONER

## 2018-10-19 PROCEDURE — 6370000000 HC RX 637 (ALT 250 FOR IP): Performed by: NURSE PRACTITIONER

## 2018-10-19 PROCEDURE — 2060000000 HC ICU INTERMEDIATE R&B

## 2018-10-19 PROCEDURE — 85610 PROTHROMBIN TIME: CPT

## 2018-10-19 PROCEDURE — 99233 SBSQ HOSP IP/OBS HIGH 50: CPT | Performed by: INTERNAL MEDICINE

## 2018-10-19 PROCEDURE — 2700000000 HC OXYGEN THERAPY PER DAY

## 2018-10-19 PROCEDURE — 97116 GAIT TRAINING THERAPY: CPT

## 2018-10-19 PROCEDURE — 85730 THROMBOPLASTIN TIME PARTIAL: CPT

## 2018-10-19 PROCEDURE — 83735 ASSAY OF MAGNESIUM: CPT

## 2018-10-19 PROCEDURE — 94762 N-INVAS EAR/PLS OXIMTRY CONT: CPT

## 2018-10-19 PROCEDURE — 6370000000 HC RX 637 (ALT 250 FOR IP): Performed by: STUDENT IN AN ORGANIZED HEALTH CARE EDUCATION/TRAINING PROGRAM

## 2018-10-19 PROCEDURE — 6360000002 HC RX W HCPCS: Performed by: NURSE PRACTITIONER

## 2018-10-19 PROCEDURE — 80048 BASIC METABOLIC PNL TOTAL CA: CPT

## 2018-10-19 RX ORDER — FUROSEMIDE 10 MG/ML
40 INJECTION INTRAMUSCULAR; INTRAVENOUS ONCE
Status: COMPLETED | OUTPATIENT
Start: 2018-10-19 | End: 2018-10-19

## 2018-10-19 RX ORDER — CLOPIDOGREL BISULFATE 75 MG/1
75 TABLET ORAL DAILY
Status: DISCONTINUED | OUTPATIENT
Start: 2018-10-20 | End: 2018-10-24 | Stop reason: HOSPADM

## 2018-10-19 RX ORDER — WARFARIN SODIUM 5 MG/1
10 TABLET ORAL ONCE
Status: COMPLETED | OUTPATIENT
Start: 2018-10-19 | End: 2018-10-19

## 2018-10-19 RX ORDER — ALPRAZOLAM 0.5 MG/1
0.5 TABLET ORAL 2 TIMES DAILY
Status: DISCONTINUED | OUTPATIENT
Start: 2018-10-19 | End: 2018-10-24 | Stop reason: HOSPADM

## 2018-10-19 RX ADMIN — Medication 10 ML: at 21:07

## 2018-10-19 RX ADMIN — PREGABALIN 50 MG: 50 CAPSULE ORAL at 08:39

## 2018-10-19 RX ADMIN — Medication 13 UNITS/KG/HR: at 19:41

## 2018-10-19 RX ADMIN — TICAGRELOR 90 MG: 90 TABLET ORAL at 08:39

## 2018-10-19 RX ADMIN — Medication 10 ML: at 08:39

## 2018-10-19 RX ADMIN — HEPARIN SODIUM 5000 UNITS: 1000 INJECTION, SOLUTION INTRAVENOUS; SUBCUTANEOUS at 19:42

## 2018-10-19 RX ADMIN — AMPICILLIN SODIUM 500 MG: 500 INJECTION, POWDER, FOR SOLUTION INTRAMUSCULAR; INTRAVENOUS at 14:48

## 2018-10-19 RX ADMIN — PANTOPRAZOLE 20 MG: 20 TABLET, DELAYED RELEASE ORAL at 08:39

## 2018-10-19 RX ADMIN — CARVEDILOL 25 MG: 25 TABLET, FILM COATED ORAL at 08:38

## 2018-10-19 RX ADMIN — ISOSORBIDE MONONITRATE 30 MG: 30 TABLET ORAL at 08:39

## 2018-10-19 RX ADMIN — HYDRALAZINE HYDROCHLORIDE 50 MG: 50 TABLET, FILM COATED ORAL at 14:50

## 2018-10-19 RX ADMIN — WARFARIN SODIUM 10 MG: 5 TABLET ORAL at 21:07

## 2018-10-19 RX ADMIN — FUROSEMIDE 40 MG: 10 INJECTION, SOLUTION INTRAMUSCULAR; INTRAVENOUS at 14:49

## 2018-10-19 RX ADMIN — SUCRALFATE 1 G: 1 TABLET ORAL at 14:48

## 2018-10-19 RX ADMIN — SUCRALFATE 1 G: 1 TABLET ORAL at 08:39

## 2018-10-19 RX ADMIN — ALPRAZOLAM 0.5 MG: 0.5 TABLET ORAL at 21:43

## 2018-10-19 RX ADMIN — INSULIN GLARGINE 40 UNITS: 100 INJECTION, SOLUTION SUBCUTANEOUS at 08:38

## 2018-10-19 RX ADMIN — ATORVASTATIN CALCIUM 80 MG: 80 TABLET, FILM COATED ORAL at 21:08

## 2018-10-19 RX ADMIN — HYDRALAZINE HYDROCHLORIDE 50 MG: 50 TABLET, FILM COATED ORAL at 05:57

## 2018-10-19 RX ADMIN — AMPICILLIN SODIUM 500 MG: 500 INJECTION, POWDER, FOR SOLUTION INTRAMUSCULAR; INTRAVENOUS at 21:08

## 2018-10-19 RX ADMIN — AMLODIPINE BESYLATE 5 MG: 5 TABLET ORAL at 08:38

## 2018-10-19 RX ADMIN — AMPICILLIN SODIUM 500 MG: 500 INJECTION, POWDER, FOR SOLUTION INTRAMUSCULAR; INTRAVENOUS at 05:56

## 2018-10-19 RX ADMIN — HYDRALAZINE HYDROCHLORIDE 50 MG: 50 TABLET, FILM COATED ORAL at 21:08

## 2018-10-19 RX ADMIN — AMPICILLIN SODIUM 500 MG: 500 INJECTION, POWDER, FOR SOLUTION INTRAMUSCULAR; INTRAVENOUS at 00:19

## 2018-10-19 RX ADMIN — HYDROCODONE BITARTRATE AND ACETAMINOPHEN 2 TABLET: 5; 325 TABLET ORAL at 06:04

## 2018-10-19 RX ADMIN — Medication 81 MG: at 08:38

## 2018-10-19 RX ADMIN — SUCRALFATE 1 G: 1 TABLET ORAL at 18:30

## 2018-10-19 RX ADMIN — HYDROCODONE BITARTRATE AND ACETAMINOPHEN 2 TABLET: 5; 325 TABLET ORAL at 21:08

## 2018-10-19 RX ADMIN — PREGABALIN 50 MG: 50 CAPSULE ORAL at 21:08

## 2018-10-19 RX ADMIN — Medication 11 UNITS/KG/HR: at 18:55

## 2018-10-19 RX ADMIN — HYDROCODONE BITARTRATE AND ACETAMINOPHEN 2 TABLET: 5; 325 TABLET ORAL at 00:12

## 2018-10-19 RX ADMIN — ALPRAZOLAM 0.5 MG: 0.5 TABLET ORAL at 14:47

## 2018-10-19 RX ADMIN — HEPARIN SODIUM 5000 UNITS: 1000 INJECTION, SOLUTION INTRAVENOUS; SUBCUTANEOUS at 04:32

## 2018-10-19 RX ADMIN — CARVEDILOL 25 MG: 25 TABLET, FILM COATED ORAL at 18:30

## 2018-10-19 RX ADMIN — INSULIN GLARGINE 40 UNITS: 100 INJECTION, SOLUTION SUBCUTANEOUS at 21:07

## 2018-10-19 RX ADMIN — TICAGRELOR 90 MG: 90 TABLET ORAL at 21:08

## 2018-10-19 RX ADMIN — HYDROCODONE BITARTRATE AND ACETAMINOPHEN 2 TABLET: 5; 325 TABLET ORAL at 16:05

## 2018-10-19 RX ADMIN — FERROUS SULFATE TAB EC 325 MG (65 MG FE EQUIVALENT) 325 MG: 325 (65 FE) TABLET DELAYED RESPONSE at 08:38

## 2018-10-19 RX ADMIN — DOCUSATE SODIUM - SENNOSIDES 2 TABLET: 50; 8.6 TABLET, FILM COATED ORAL at 08:39

## 2018-10-19 RX ADMIN — PREGABALIN 50 MG: 50 CAPSULE ORAL at 14:47

## 2018-10-19 ASSESSMENT — PAIN SCALES - GENERAL
PAINLEVEL_OUTOF10: 8
PAINLEVEL_OUTOF10: 10
PAINLEVEL_OUTOF10: 9
PAINLEVEL_OUTOF10: 9

## 2018-10-19 ASSESSMENT — PAIN DESCRIPTION - PROGRESSION
CLINICAL_PROGRESSION: GRADUALLY IMPROVING
CLINICAL_PROGRESSION: GRADUALLY IMPROVING

## 2018-10-19 NOTE — PROGRESS NOTES
Physical Therapy  Facility/Department: [facility] CAR 1  Daily Treatment Note  NAME: Danielle Monique  : 1965  MRN: [id number]    Date of Service: 10/19/2018    Discharge Recommendations:  Home with assist PRN      Patient Diagnosis(es): The primary encounter diagnosis was NSTEMI (non-ST elevated myocardial infarction) ([address] A diagnosis of SVT (supraventricular tachycardia) (HCC) was also pertinent to this visit. has a past medical history of COPD (chronic obstructive pulmonary disease) ([address] Dysphagia; Equinus contracture of right ankle; Foot infection; Hand swelling; HTN (hypertension); Hyperglycemia without ketosis; Hyperkalemia; Leukocytosis; Mixed hyperlipidemia; MRSA infection; Nausea & vomiting; Neutrophilic leukocytosis; Osteomyelitis ([address] Osteomyelitis of foot ([address] Painful orthopaedic hardware [facility]); Scrotum swelling; Septic arthritis ([address] Septic arthritis of wrist, left ([address] Sickle-cell trait ([address] Skin ulcers of foot, bilateral ([address] Steroid-induced hyperglycemia; Streptococcal arthritis of left wrist ([address] Type 2 diabetes mellitus, uncontrolled ([address] Uncontrolled type 2 diabetes mellitus with diabetic nephropathy, with long-term current use of insulin ([address] and Uncontrolled type 2 diabetes mellitus with retinopathy, with long-term current use of insulin ([address] has a past surgical history that includes amputation; amputation; and Cardiac surgery. Restrictions  Restrictions/Precautions  Restrictions/Precautions: General Precautions, Cardiac, Fall Risk  Required Braces or Orthoses?: No  Position Activity Restriction  Other position/activity restrictions: up with assist.  Subjective   General  Chart Reviewed: Yes  Response To Previous Treatment: Patient with no complaints from previous session. Family / Caregiver Present: Yes  Subjective  Subjective: alert, sitting up in bed. General Comment  Comments: Pt. left in chair after RX.   Pain Screening  Patient Currently in Pain:

## 2018-10-19 NOTE — PROGRESS NOTES
Occupational Therapy  Facility/Department: [facility] CAR 1  Daily Treatment Note  NAME: Joy Storey  : 1965  MRN: [id number]    Date of Service: 10/19/2018    Discharge Recommendations:  Home with assist PRN, Continue to assess pending progress       Patient Diagnosis(es): The primary encounter diagnosis was NSTEMI (non-ST elevated myocardial infarction) ([address] A diagnosis of SVT (supraventricular tachycardia) (HCC) was also pertinent to this visit. has a past medical history of COPD (chronic obstructive pulmonary disease) ([address] Dysphagia; Equinus contracture of right ankle; Foot infection; Hand swelling; HTN (hypertension); Hyperglycemia without ketosis; Hyperkalemia; Leukocytosis; Mixed hyperlipidemia; MRSA infection; Nausea & vomiting; Neutrophilic leukocytosis; Osteomyelitis ([address] Osteomyelitis of foot ([address] Painful orthopaedic hardware [facility]); Scrotum swelling; Septic arthritis ([address] Septic arthritis of wrist, left ([address] Sickle-cell trait ([address] Skin ulcers of foot, bilateral ([address] Steroid-induced hyperglycemia; Streptococcal arthritis of left wrist ([address] Type 2 diabetes mellitus, uncontrolled ([address] Uncontrolled type 2 diabetes mellitus with diabetic nephropathy, with long-term current use of insulin ([address] and Uncontrolled type 2 diabetes mellitus with retinopathy, with long-term current use of insulin ([address] has a past surgical history that includes amputation; amputation; and Cardiac surgery.     Restrictions  Restrictions/Precautions  Restrictions/Precautions: General Precautions, Cardiac, Fall Risk  Required Braces or Orthoses?: No  Position Activity Restriction  Other position/activity restrictions: up with assist.  Subjective   General  Patient assessed for rehabilitation services?: Yes  Family / Caregiver Present: No  Pain Assessment  Patient Currently in Pain: Denies  Clinical Progression: Gradually improving  Response to Pain Intervention: Patient Satisfied   Objective

## 2018-10-19 NOTE — PROGRESS NOTES
Dr. Josseline Bunch at bedside with patient. Notified of anxiety overnight and this AM and need for SW consult.

## 2018-10-19 NOTE — PROGRESS NOTES
NEPHROLOGY PROGRESS NOTE      SUBJECTIVE   Patient was seen and examined. No new issues overnight. Had stent placement (10/16/18). Tolerated the procedure well. Creatinine increased to 2.10 MG per DL today. He had about 1.8 L of urine lasted for hours. Frontal have positive DVT, started on heparin bridging to Coumadin. OBJECTIVE     Vitals:    10/19/18 0630 10/19/18 0638 10/19/18 1241 10/19/18 1451   BP:  124/71 129/60    Pulse:  75 71 75   Resp:  16 20 20   Temp:  97.6 °F (36.4 °C) 97.8 °F (36.6 °C)    TempSrc:  Oral Oral    SpO2:  100% 99% 100%   Weight: (!) 368 lb 13.3 oz (167.3 kg)      Height:         24HR INTAKE/OUTPUT:      Intake/Output Summary (Last 24 hours) at 10/19/18 1534  Last data filed at 10/19/18 0900   Gross per 24 hour   Intake             2333 ml   Output             1500 ml   Net              833 ml       General appearance:Awake, alert  HEENT: PERRLA  Respiratory::vesicular breath sounds,n  Cardiovascular:S1 S2 normal,no gallop or organic murmur. Abdomen:Non tender/non distended. Bowel sounds present  Extremities: No Cyanosis or Clubbing,Present Lower extremity edema, +ve large scrotal swelling. Neurological:Alert and oriented. No abnormalities of mood, affect, memory, mentation, or behavior are noted      MEDICATIONS     Scheduled Meds:    warfarin  10 mg Oral Once    ALPRAZolam  0.5 mg Oral BID    ticagrelor  90 mg Oral BID    [START ON 10/20/2018] clopidogrel  75 mg Oral Daily    hydrALAZINE  50 mg Oral 3 times per day    amLODIPine  5 mg Oral Daily    sennosides-docusate sodium  2 tablet Oral BID    warfarin (COUMADIN) daily dosing (placeholder)   Other RX Placeholder    ampicillin IV  500 mg Intravenous Q6H    carvedilol  25 mg Oral BID     isosorbide mononitrate  30 mg Oral Daily    sodium chloride flush  10 mL Intravenous 2 times per day    atorvastatin  80 mg Oral Nightly    insulin glargine  40 Units Subcutaneous BID    ferrous sulfate  325 mg Oral BID

## 2018-10-19 NOTE — PROGRESS NOTES
cardiomyopathy [I25.5] 10/18/2018    Left leg DVT ([facility] [I82.402] 52/56/2442    Systolic dysfunction, left ventricle [I51.9] 10/16/2018    UTI (urinary tract infection) due to Enterococcus [N39.0, B95.2] 10/16/2018    Acute coronary syndrome ([address] [I24.9] 10/15/2018    SVT (supraventricular tachycardia) (HCC) [I47.1] 10/15/2018    Elevated troponin [R74.8] 10/15/2018    Type 2 diabetes mellitus with diabetic peripheral angiopathy without gangrene, with long-term current use of insulin ([facility] [E11.51, Z79.4] 10/12/2018    Scrotum swelling [N50.89] 09/06/2018    HTN (hypertension) [I10] 12/27/2012    Nausea & vomiting [R11.2] 12/27/2012       Plan:      - Large inguinal hernia with multiple bowel loops: Gen sx recommending outpatient sx. - b/l LE edema: Lasix as per nephro. Will monitor response. - Anxiety:add xanax BID. - uncontrolled HTN:cardiology following, appreciate input. - Left LE DVT:on heparin. Started on  coumadin. Follow INR.  - CAD s/p PCI with ischemic CMPcontinue ASA,  statin, BB. brillinta being switched to plavix per cardiology. Recommend:'Needs ASA and Plavix for at least 4 weeks; then can be de-escalated to Plavix with Warfarin with outpatient follow-up'. -  consult : help with housing options.     Barbara Gomez MD  10/19/2018

## 2018-10-20 LAB
ANION GAP SERPL CALCULATED.3IONS-SCNC: 10 MMOL/L (ref 9–17)
BUN BLDV-MCNC: 38 MG/DL (ref 6–20)
BUN/CREAT BLD: ABNORMAL (ref 9–20)
CALCIUM SERPL-MCNC: 8.7 MG/DL (ref 8.6–10.4)
CHLORIDE BLD-SCNC: 101 MMOL/L (ref 98–107)
CO2: 29 MMOL/L (ref 20–31)
CREAT SERPL-MCNC: 1.82 MG/DL (ref 0.7–1.2)
GFR AFRICAN AMERICAN: 47 ML/MIN
GFR NON-AFRICAN AMERICAN: 39 ML/MIN
GFR SERPL CREATININE-BSD FRML MDRD: ABNORMAL ML/MIN/{1.73_M2}
GFR SERPL CREATININE-BSD FRML MDRD: ABNORMAL ML/MIN/{1.73_M2}
GLUCOSE BLD-MCNC: 51 MG/DL (ref 75–110)
GLUCOSE BLD-MCNC: 54 MG/DL (ref 75–110)
GLUCOSE BLD-MCNC: 63 MG/DL (ref 75–110)
GLUCOSE BLD-MCNC: 66 MG/DL (ref 75–110)
GLUCOSE BLD-MCNC: 71 MG/DL (ref 75–110)
GLUCOSE BLD-MCNC: 73 MG/DL (ref 70–99)
GLUCOSE BLD-MCNC: 74 MG/DL (ref 75–110)
GLUCOSE BLD-MCNC: 87 MG/DL (ref 75–110)
GLUCOSE BLD-MCNC: 91 MG/DL (ref 75–110)
GLUCOSE BLD-MCNC: 92 MG/DL (ref 75–110)
GLUCOSE BLD-MCNC: 98 MG/DL (ref 75–110)
INR BLD: 1.1
MAGNESIUM: 1.5 MG/DL (ref 1.6–2.6)
PARTIAL THROMBOPLASTIN TIME: 56 SEC (ref 20.5–30.5)
PARTIAL THROMBOPLASTIN TIME: 69.6 SEC (ref 20.5–30.5)
PARTIAL THROMBOPLASTIN TIME: 81.2 SEC (ref 20.5–30.5)
PLATELET # BLD: 290 K/UL (ref 138–453)
POTASSIUM SERPL-SCNC: 4.8 MMOL/L (ref 3.7–5.3)
POTASSIUM SERPL-SCNC: 4.9 MMOL/L (ref 3.7–5.3)
PROTHROMBIN TIME: 11.3 SEC (ref 9–12)
SODIUM BLD-SCNC: 140 MMOL/L (ref 135–144)

## 2018-10-20 PROCEDURE — 6370000000 HC RX 637 (ALT 250 FOR IP): Performed by: INTERNAL MEDICINE

## 2018-10-20 PROCEDURE — 97530 THERAPEUTIC ACTIVITIES: CPT

## 2018-10-20 PROCEDURE — 36415 COLL VENOUS BLD VENIPUNCTURE: CPT

## 2018-10-20 PROCEDURE — 6360000002 HC RX W HCPCS: Performed by: NURSE PRACTITIONER

## 2018-10-20 PROCEDURE — 97535 SELF CARE MNGMENT TRAINING: CPT

## 2018-10-20 PROCEDURE — 6370000000 HC RX 637 (ALT 250 FOR IP): Performed by: STUDENT IN AN ORGANIZED HEALTH CARE EDUCATION/TRAINING PROGRAM

## 2018-10-20 PROCEDURE — 99232 SBSQ HOSP IP/OBS MODERATE 35: CPT | Performed by: INTERNAL MEDICINE

## 2018-10-20 PROCEDURE — 6360000002 HC RX W HCPCS: Performed by: INTERNAL MEDICINE

## 2018-10-20 PROCEDURE — 85610 PROTHROMBIN TIME: CPT

## 2018-10-20 PROCEDURE — 2700000000 HC OXYGEN THERAPY PER DAY

## 2018-10-20 PROCEDURE — 2060000000 HC ICU INTERMEDIATE R&B

## 2018-10-20 PROCEDURE — 2580000003 HC RX 258: Performed by: NURSE PRACTITIONER

## 2018-10-20 PROCEDURE — 6370000000 HC RX 637 (ALT 250 FOR IP)

## 2018-10-20 PROCEDURE — 80048 BASIC METABOLIC PNL TOTAL CA: CPT

## 2018-10-20 PROCEDURE — 6370000000 HC RX 637 (ALT 250 FOR IP): Performed by: NURSE PRACTITIONER

## 2018-10-20 PROCEDURE — 82947 ASSAY GLUCOSE BLOOD QUANT: CPT

## 2018-10-20 PROCEDURE — 85049 AUTOMATED PLATELET COUNT: CPT

## 2018-10-20 PROCEDURE — 85730 THROMBOPLASTIN TIME PARTIAL: CPT

## 2018-10-20 RX ORDER — FUROSEMIDE 10 MG/ML
40 INJECTION INTRAMUSCULAR; INTRAVENOUS ONCE
Status: DISCONTINUED | OUTPATIENT
Start: 2018-10-20 | End: 2018-10-20

## 2018-10-20 RX ORDER — OXYCODONE HYDROCHLORIDE AND ACETAMINOPHEN 5; 325 MG/1; MG/1
1 TABLET ORAL EVERY 4 HOURS PRN
Status: DISCONTINUED | OUTPATIENT
Start: 2018-10-20 | End: 2018-10-24 | Stop reason: HOSPADM

## 2018-10-20 RX ORDER — INSULIN GLARGINE 100 [IU]/ML
20 INJECTION, SOLUTION SUBCUTANEOUS 2 TIMES DAILY
Status: DISCONTINUED | OUTPATIENT
Start: 2018-10-20 | End: 2018-10-21

## 2018-10-20 RX ORDER — FUROSEMIDE 10 MG/ML
40 INJECTION INTRAMUSCULAR; INTRAVENOUS ONCE
Status: DISCONTINUED | OUTPATIENT
Start: 2018-10-20 | End: 2018-10-24

## 2018-10-20 RX ORDER — TAMSULOSIN HYDROCHLORIDE 0.4 MG/1
0.4 CAPSULE ORAL DAILY
Status: DISCONTINUED | OUTPATIENT
Start: 2018-10-20 | End: 2018-10-24 | Stop reason: HOSPADM

## 2018-10-20 RX ORDER — WARFARIN SODIUM 5 MG/1
10 TABLET ORAL ONCE
Status: COMPLETED | OUTPATIENT
Start: 2018-10-20 | End: 2018-10-20

## 2018-10-20 RX ORDER — OXYCODONE HYDROCHLORIDE AND ACETAMINOPHEN 5; 325 MG/1; MG/1
TABLET ORAL
Status: COMPLETED
Start: 2018-10-20 | End: 2018-10-20

## 2018-10-20 RX ORDER — OXYCODONE HYDROCHLORIDE AND ACETAMINOPHEN 5; 325 MG/1; MG/1
2 TABLET ORAL EVERY 4 HOURS PRN
Status: DISCONTINUED | OUTPATIENT
Start: 2018-10-20 | End: 2018-10-24 | Stop reason: HOSPADM

## 2018-10-20 RX ORDER — METHYLPREDNISOLONE SODIUM SUCCINATE 125 MG/2ML
80 INJECTION, POWDER, LYOPHILIZED, FOR SOLUTION INTRAMUSCULAR; INTRAVENOUS ONCE
Status: COMPLETED | OUTPATIENT
Start: 2018-10-20 | End: 2018-10-20

## 2018-10-20 RX ORDER — PREDNISONE 20 MG/1
40 TABLET ORAL DAILY
Status: DISCONTINUED | OUTPATIENT
Start: 2018-10-21 | End: 2018-10-24 | Stop reason: HOSPADM

## 2018-10-20 RX ADMIN — AMPICILLIN SODIUM 500 MG: 500 INJECTION, POWDER, FOR SOLUTION INTRAMUSCULAR; INTRAVENOUS at 02:35

## 2018-10-20 RX ADMIN — SUCRALFATE 1 G: 1 TABLET ORAL at 18:21

## 2018-10-20 RX ADMIN — Medication 13 UNITS/KG/HR: at 21:34

## 2018-10-20 RX ADMIN — Medication 81 MG: at 08:16

## 2018-10-20 RX ADMIN — HYDRALAZINE HYDROCHLORIDE 50 MG: 50 TABLET, FILM COATED ORAL at 06:39

## 2018-10-20 RX ADMIN — PREGABALIN 50 MG: 50 CAPSULE ORAL at 08:16

## 2018-10-20 RX ADMIN — MAGNESIUM SULFATE HEPTAHYDRATE 1 G: 1 INJECTION, SOLUTION INTRAVENOUS at 02:35

## 2018-10-20 RX ADMIN — CARVEDILOL 25 MG: 25 TABLET, FILM COATED ORAL at 08:16

## 2018-10-20 RX ADMIN — TAMSULOSIN HYDROCHLORIDE 0.4 MG: 0.4 CAPSULE ORAL at 18:21

## 2018-10-20 RX ADMIN — AMPICILLIN SODIUM 500 MG: 500 INJECTION, POWDER, FOR SOLUTION INTRAMUSCULAR; INTRAVENOUS at 09:29

## 2018-10-20 RX ADMIN — PANTOPRAZOLE 20 MG: 20 TABLET, DELAYED RELEASE ORAL at 08:16

## 2018-10-20 RX ADMIN — DEXTROSE MONOHYDRATE 100 ML/HR: 50 INJECTION, SOLUTION INTRAVENOUS at 13:10

## 2018-10-20 RX ADMIN — METHYLPREDNISOLONE SODIUM SUCCINATE 80 MG: 125 INJECTION, POWDER, FOR SOLUTION INTRAMUSCULAR; INTRAVENOUS at 18:53

## 2018-10-20 RX ADMIN — DEXTROSE MONOHYDRATE 12.5 G: 500 INJECTION PARENTERAL at 13:18

## 2018-10-20 RX ADMIN — OXYCODONE HYDROCHLORIDE AND ACETAMINOPHEN 2 TABLET: 5; 325 TABLET ORAL at 04:41

## 2018-10-20 RX ADMIN — AMLODIPINE BESYLATE 5 MG: 5 TABLET ORAL at 08:16

## 2018-10-20 RX ADMIN — BENZOCAINE AND MENTHOL 1 LOZENGE: 15; 3.6 LOZENGE ORAL at 18:20

## 2018-10-20 RX ADMIN — CARVEDILOL 25 MG: 25 TABLET, FILM COATED ORAL at 18:21

## 2018-10-20 RX ADMIN — HYDRALAZINE HYDROCHLORIDE 50 MG: 50 TABLET, FILM COATED ORAL at 21:38

## 2018-10-20 RX ADMIN — AMPICILLIN SODIUM 500 MG: 500 INJECTION, POWDER, FOR SOLUTION INTRAMUSCULAR; INTRAVENOUS at 14:41

## 2018-10-20 RX ADMIN — MAGNESIUM SULFATE HEPTAHYDRATE 1 G: 1 INJECTION, SOLUTION INTRAVENOUS at 01:22

## 2018-10-20 RX ADMIN — OXYCODONE HYDROCHLORIDE AND ACETAMINOPHEN 2 TABLET: 5; 325 TABLET ORAL at 19:00

## 2018-10-20 RX ADMIN — PREGABALIN 50 MG: 50 CAPSULE ORAL at 21:49

## 2018-10-20 RX ADMIN — BENZOCAINE AND MENTHOL 1 LOZENGE: 15; 3.6 LOZENGE ORAL at 02:30

## 2018-10-20 RX ADMIN — FERROUS SULFATE TAB EC 325 MG (65 MG FE EQUIVALENT) 325 MG: 325 (65 FE) TABLET DELAYED RESPONSE at 18:21

## 2018-10-20 RX ADMIN — HEPARIN SODIUM 5000 UNITS: 1000 INJECTION, SOLUTION INTRAVENOUS; SUBCUTANEOUS at 13:35

## 2018-10-20 RX ADMIN — SUCRALFATE 1 G: 1 TABLET ORAL at 08:16

## 2018-10-20 RX ADMIN — WARFARIN SODIUM 10 MG: 5 TABLET ORAL at 18:39

## 2018-10-20 RX ADMIN — ATORVASTATIN CALCIUM 80 MG: 80 TABLET, FILM COATED ORAL at 21:49

## 2018-10-20 RX ADMIN — ISOSORBIDE MONONITRATE 30 MG: 30 TABLET ORAL at 08:16

## 2018-10-20 RX ADMIN — AMPICILLIN SODIUM 500 MG: 500 INJECTION, POWDER, FOR SOLUTION INTRAMUSCULAR; INTRAVENOUS at 21:49

## 2018-10-20 RX ADMIN — DOCUSATE SODIUM - SENNOSIDES 2 TABLET: 50; 8.6 TABLET, FILM COATED ORAL at 21:38

## 2018-10-20 RX ADMIN — DOCUSATE SODIUM - SENNOSIDES 2 TABLET: 50; 8.6 TABLET, FILM COATED ORAL at 08:17

## 2018-10-20 RX ADMIN — CLOPIDOGREL 75 MG: 75 TABLET, FILM COATED ORAL at 08:16

## 2018-10-20 ASSESSMENT — PAIN DESCRIPTION - PROGRESSION
CLINICAL_PROGRESSION: GRADUALLY WORSENING

## 2018-10-20 ASSESSMENT — PAIN DESCRIPTION - LOCATION
LOCATION: ABDOMEN

## 2018-10-20 ASSESSMENT — PAIN SCALES - GENERAL
PAINLEVEL_OUTOF10: 8
PAINLEVEL_OUTOF10: 7
PAINLEVEL_OUTOF10: 7
PAINLEVEL_OUTOF10: 10
PAINLEVEL_OUTOF10: 8

## 2018-10-20 ASSESSMENT — PAIN DESCRIPTION - PAIN TYPE
TYPE: ACUTE PAIN
TYPE: ACUTE PAIN
TYPE: CHRONIC PAIN
TYPE: ACUTE PAIN

## 2018-10-20 ASSESSMENT — PAIN DESCRIPTION - FREQUENCY
FREQUENCY: CONTINUOUS

## 2018-10-20 ASSESSMENT — PAIN DESCRIPTION - ONSET: ONSET: ON-GOING

## 2018-10-20 ASSESSMENT — PAIN DESCRIPTION - DESCRIPTORS
DESCRIPTORS: DISCOMFORT
DESCRIPTORS: ACHING;BURNING;CONSTANT

## 2018-10-20 NOTE — PROGRESS NOTES
dextrose, glucagon (rDNA), dextrose, sodium chloride flush, sodium chloride flush    Data:     Past Medical History:   has a past medical history of COPD (chronic obstructive pulmonary disease) ([address] Dysphagia; Equinus contracture of right ankle; Foot infection; Hand swelling; HTN (hypertension); Hyperglycemia without ketosis; Hyperkalemia; Leukocytosis; Mixed hyperlipidemia; MRSA infection; Nausea & vomiting; Neutrophilic leukocytosis; Osteomyelitis ([address] Osteomyelitis of foot ([address] Painful orthopaedic hardware [facility]); Scrotum swelling; Septic arthritis ([address] Septic arthritis of wrist, left ([address] Sickle-cell trait ([address] Skin ulcers of foot, bilateral ([address] Steroid-induced hyperglycemia; Streptococcal arthritis of left wrist ([address] Type 2 diabetes mellitus, uncontrolled ([address] Uncontrolled type 2 diabetes mellitus with diabetic nephropathy, with long-term current use of insulin ([address] and Uncontrolled type 2 diabetes mellitus with retinopathy, with long-term current use of insulin ([address] Social History:   reports that he quit smoking about 9 months ago. He has never used smokeless tobacco. He reports that he does not drink alcohol or use drugs. Family History:   Family History   Problem Relation Age of Onset    No Known Problems Mother     No Known Problems Father     No Known Problems Sister        Vitals:  /77   Pulse 84   Temp 98.1 °F (36.7 °C) (Oral)   Resp 16   Ht 6' 4\" (1.93 m)   Wt (!) 369 lb 4.3 oz (167.5 kg)   SpO2 95%   BMI 44.95 kg/m²   Temp (24hrs), Av °F (36.7 °C), Min:97.8 °F (36.6 °C), Max:98.2 °F (36.8 °C)    Recent Labs      10/20/18   1200  10/20/18   1258  10/20/18   1427  10/20/18   1557   POCGLU  71*  54*  98  92       I/O (24Hr):     Intake/Output Summary (Last 24 hours) at 10/20/18 1825  Last data filed at 10/20/18 0800   Gross per 24 hour   Intake             1538 ml   Output             1625 ml   Net              -87 ml       Labs:    Hematology:  Recent Labs

## 2018-10-20 NOTE — PROGRESS NOTES
Occupational Therapy  Facility/Department: [facility] 1  Daily Treatment Note  NAME: Carolee Cano  : 1965  MRN: [id number]    Date of Service: 10/20/2018    Discharge Recommendations:  [address]       Patient Diagnosis(es): The primary encounter diagnosis was NSTEMI (non-ST elevated myocardial infarction) ([address] A diagnosis of SVT (supraventricular tachycardia) (HCC) was also pertinent to this visit. has a past medical history of COPD (chronic obstructive pulmonary disease) ([address] Dysphagia; Equinus contracture of right ankle; Foot infection; Hand swelling; HTN (hypertension); Hyperglycemia without ketosis; Hyperkalemia; Leukocytosis; Mixed hyperlipidemia; MRSA infection; Nausea & vomiting; Neutrophilic leukocytosis; Osteomyelitis ([address] Osteomyelitis of foot ([address] Painful orthopaedic hardware [facility]); Scrotum swelling; Septic arthritis ([address] Septic arthritis of wrist, left ([address] Sickle-cell trait ([address] Skin ulcers of foot, bilateral ([address] Steroid-induced hyperglycemia; Streptococcal arthritis of left wrist ([address] Type 2 diabetes mellitus, uncontrolled ([address] Uncontrolled type 2 diabetes mellitus with diabetic nephropathy, with long-term current use of insulin ([address] and Uncontrolled type 2 diabetes mellitus with retinopathy, with long-term current use of insulin ([address] has a past surgical history that includes amputation; amputation; and Cardiac surgery.     Restrictions  Restrictions/Precautions  Restrictions/Precautions: General Precautions, Cardiac, Fall Risk  Required Braces or Orthoses?: No  Position Activity Restriction  Other position/activity restrictions: up with assist.  Subjective   General  Patient assessed for rehabilitation services?: Yes  Family / Caregiver Present: No  Pain Assessment  Patient Currently in Pain: Yes  Pain Level: 7  Pain Type: Acute pain  Pain Location: Abdomen  Pain Descriptors: Aching;Burning;Constant  Pain Frequency: Continuous  Pain Intervention(s): Medication (see eMar);Repositioned; Ambulation/Increased activity  Response to Pain Intervention: None  Vital Signs  Patient Currently in Pain: Yes   Orientation  Orientation  Overall Orientation Status: Within Functional Limits  Objective    Pt drowsy/lethargic/ mild sweat on forehead, and easily agitated during the session. Pt' BS was low and writer/ RN encouraging pt to eat/drink- good return. Pt needed to use bathroom but pt too unsteady to demo transfer to bathroom. Pt demo bed mob and sat on EOB to use urinal. Pt resistant to assistance even after max encouragement and ed given on importance of accepting help d/t being unsteady on feet and using walker incorrectly. Pt wanted to rest forearms on walker while trying to use urinal. Pt became agitated w/ RN and writer about being present during toileting. Pt pants were soaked w/ urine and smelled. New pants were placed on pt after increased time given to use urinal seated on EOB. Writer left pt seated on EOB w/ PTA whom took over care. ADL  Feeding: Setup;Bringing food to mouth assist;Beverage management;Supervision;Verbal cueing; Increased time to complete  Simple grooming: washed face several times w/ cool wash cloth  LE Dressing: Setup;Dependent/Total   Balance  Sitting Balance: Stand by assistance (seated on EOB)  Standing Balance: Stand by assistance (w/SW)  Standing Balance  Sit to stand: Stand by assistance  Stand to sit: Stand by assistance  Bed mobility  Supine to Sit: Maximum assistance (+2)  Scooting: Maximal assistance (+2)  Transfers  Stand Step Transfers: Contact guard assistance  Sit to stand: Stand by assistance  Stand to sit: Stand by assistance  Attendance  Participation: Active participation    Assessment   Performance deficits / Impairments: Decreased functional mobility ; Decreased endurance;Decreased ADL status; Decreased high-level IADLs  Prognosis: Good  Patient Education: Ed on OT services/POC, transfer safety, bed mob,

## 2018-10-20 NOTE — PROGRESS NOTES
by mouth daily  pantoprazole (PROTONIX) 20 MG tablet, Take 20 mg by mouth daily  atorvastatin (LIPITOR) 10 MG tablet, Take by mouth daily  insulin glargine (LANTUS) 100 UNIT/ML injection vial, Inject 40 Units into the skin 2 times daily   aspirin 81 MG tablet, Take 81 mg by mouth daily  [DISCONTINUED] insulin NPH (HUMULIN N;NOVOLIN N) 100 UNIT/ML injection vial, While on 50 mg prednisone, last dose 3/14/18 80 units of NPH in the AM, 55 units of NPH with dinner 15 of Regular with meals  Regular insulin 2 units per every 50 mg/dl greater than 150 mg/dl   3/15 return to previous insulin doses: 50 units of NPH with breakfast, 35 units of NPH with dinner 10 units of Regular insulin with meals Regular in    INVESTIGATIONS     Last 3 CMP:    Recent Labs      10/18/18   0244  10/19/18   0236  10/20/18   0009  10/20/18   0435   NA  142  137   --   140   K  4.5  4.3  4.8  4.9   CL  100  94*   --   101   CO2  30  31   --   29   BUN  25*  31*   --   38*   CREATININE  1.49*  2.10*   --   1.82*   CALCIUM  8.2*  8.5*   --   8.7       Last 3 CBC:  Recent Labs      10/18/18   0244  10/19/18   0236  10/20/18   0435   WBC  5.5   --    --    RBC  4.29   --    --    HGB  10.5*   --    --    HCT  34.1*   --    --    MCV  79.5*   --    --    MCH  24.5*   --    --    MCHC  30.8   --    --    RDW  17.3*   --    --    PLT  289  328  290   MPV  10.0   --    --        ASSESSMENT     1. ALLISON likely secondary to contrast-induced nephropathy. Improving?? Obstructive component with hernia  2. Chronic kidney disease stage III secondary to diabetic nephrosclerosis with baseline creatinine 1.3-1.6  3. NSTEMI status post PCI BMS-LAD  4. DM2  5. HTN  6. Morbid Obesity  7. +ve large scrotal swelling- ventral hernia    PLAN     1. Daily Labs  2. Continue to monitor strict I's and O's and renal function   3. Will follow  4. Start Flomax   5.  Additional Lasix 40 mg IV x 1 dose    Please do not hesitate to call with questions  Attending Physician

## 2018-10-21 LAB
-: NORMAL
ANION GAP SERPL CALCULATED.3IONS-SCNC: 11 MMOL/L (ref 9–17)
BUN BLDV-MCNC: 44 MG/DL (ref 6–20)
BUN/CREAT BLD: ABNORMAL (ref 9–20)
CALCIUM SERPL-MCNC: 8.6 MG/DL (ref 8.6–10.4)
CHLORIDE BLD-SCNC: 97 MMOL/L (ref 98–107)
CO2: 27 MMOL/L (ref 20–31)
CREAT SERPL-MCNC: 1.92 MG/DL (ref 0.7–1.2)
CULTURE: NORMAL
ESTIMATED AVERAGE GLUCOSE: 286 MG/DL
GFR AFRICAN AMERICAN: 44 ML/MIN
GFR NON-AFRICAN AMERICAN: 37 ML/MIN
GFR SERPL CREATININE-BSD FRML MDRD: ABNORMAL ML/MIN/{1.73_M2}
GFR SERPL CREATININE-BSD FRML MDRD: ABNORMAL ML/MIN/{1.73_M2}
GLUCOSE BLD-MCNC: 156 MG/DL (ref 75–110)
GLUCOSE BLD-MCNC: 241 MG/DL (ref 75–110)
GLUCOSE BLD-MCNC: 273 MG/DL (ref 75–110)
GLUCOSE BLD-MCNC: 296 MG/DL (ref 75–110)
GLUCOSE BLD-MCNC: 299 MG/DL (ref 75–110)
GLUCOSE BLD-MCNC: 306 MG/DL (ref 70–99)
GLUCOSE BLD-MCNC: 311 MG/DL (ref 75–110)
HBA1C MFR BLD: 11.6 % (ref 4–6)
INR BLD: 1.3
Lab: NORMAL
PARTIAL THROMBOPLASTIN TIME: 70.4 SEC (ref 20.5–30.5)
PLATELET # BLD: 256 K/UL (ref 138–453)
POTASSIUM SERPL-SCNC: 5.4 MMOL/L (ref 3.7–5.3)
POTASSIUM SERPL-SCNC: 5.5 MMOL/L (ref 3.7–5.3)
POTASSIUM SERPL-SCNC: 5.6 MMOL/L (ref 3.7–5.3)
PROTHROMBIN TIME: 13.6 SEC (ref 9–12)
REASON FOR REJECTION: NORMAL
SODIUM BLD-SCNC: 135 MMOL/L (ref 135–144)
SPECIMEN DESCRIPTION: NORMAL
STATUS: NORMAL
ZZ NTE CLEAN UP: ORDERED TEST: NORMAL
ZZ NTE WITH NAME CLEAN UP: SPECIMEN SOURCE: NORMAL

## 2018-10-21 PROCEDURE — 2580000003 HC RX 258: Performed by: STUDENT IN AN ORGANIZED HEALTH CARE EDUCATION/TRAINING PROGRAM

## 2018-10-21 PROCEDURE — 85730 THROMBOPLASTIN TIME PARTIAL: CPT

## 2018-10-21 PROCEDURE — 6370000000 HC RX 637 (ALT 250 FOR IP): Performed by: INTERNAL MEDICINE

## 2018-10-21 PROCEDURE — 6370000000 HC RX 637 (ALT 250 FOR IP): Performed by: STUDENT IN AN ORGANIZED HEALTH CARE EDUCATION/TRAINING PROGRAM

## 2018-10-21 PROCEDURE — 2580000003 HC RX 258: Performed by: NURSE PRACTITIONER

## 2018-10-21 PROCEDURE — 83036 HEMOGLOBIN GLYCOSYLATED A1C: CPT

## 2018-10-21 PROCEDURE — 94762 N-INVAS EAR/PLS OXIMTRY CONT: CPT

## 2018-10-21 PROCEDURE — 36415 COLL VENOUS BLD VENIPUNCTURE: CPT

## 2018-10-21 PROCEDURE — 80048 BASIC METABOLIC PNL TOTAL CA: CPT

## 2018-10-21 PROCEDURE — 6370000000 HC RX 637 (ALT 250 FOR IP): Performed by: NURSE PRACTITIONER

## 2018-10-21 PROCEDURE — 6360000002 HC RX W HCPCS: Performed by: INTERNAL MEDICINE

## 2018-10-21 PROCEDURE — 2060000000 HC ICU INTERMEDIATE R&B

## 2018-10-21 PROCEDURE — 2700000000 HC OXYGEN THERAPY PER DAY

## 2018-10-21 PROCEDURE — 99232 SBSQ HOSP IP/OBS MODERATE 35: CPT | Performed by: INTERNAL MEDICINE

## 2018-10-21 PROCEDURE — 85049 AUTOMATED PLATELET COUNT: CPT

## 2018-10-21 PROCEDURE — 82947 ASSAY GLUCOSE BLOOD QUANT: CPT

## 2018-10-21 PROCEDURE — 84132 ASSAY OF SERUM POTASSIUM: CPT

## 2018-10-21 PROCEDURE — 85610 PROTHROMBIN TIME: CPT

## 2018-10-21 PROCEDURE — 97110 THERAPEUTIC EXERCISES: CPT

## 2018-10-21 PROCEDURE — 2580000003 HC RX 258: Performed by: INTERNAL MEDICINE

## 2018-10-21 RX ORDER — INSULIN GLARGINE 100 [IU]/ML
40 INJECTION, SOLUTION SUBCUTANEOUS 2 TIMES DAILY
Status: DISCONTINUED | OUTPATIENT
Start: 2018-10-22 | End: 2018-10-24 | Stop reason: HOSPADM

## 2018-10-21 RX ORDER — FUROSEMIDE 40 MG/1
40 TABLET ORAL DAILY
Status: DISCONTINUED | OUTPATIENT
Start: 2018-10-21 | End: 2018-10-23

## 2018-10-21 RX ORDER — WARFARIN SODIUM 5 MG/1
10 TABLET ORAL ONCE
Status: COMPLETED | OUTPATIENT
Start: 2018-10-21 | End: 2018-10-21

## 2018-10-21 RX ORDER — SUCRALFATE 1 G/1
1 TABLET ORAL EVERY 12 HOURS SCHEDULED
Status: DISCONTINUED | OUTPATIENT
Start: 2018-10-22 | End: 2018-10-24 | Stop reason: HOSPADM

## 2018-10-21 RX ORDER — DEXTROSE MONOHYDRATE 25 G/50ML
25 INJECTION, SOLUTION INTRAVENOUS PRN
Status: DISCONTINUED | OUTPATIENT
Start: 2018-10-21 | End: 2018-10-24 | Stop reason: HOSPADM

## 2018-10-21 RX ORDER — SODIUM POLYSTYRENE SULFONATE 15 G/60ML
15 SUSPENSION ORAL; RECTAL ONCE
Status: COMPLETED | OUTPATIENT
Start: 2018-10-21 | End: 2018-10-22

## 2018-10-21 RX ORDER — FLUDROCORTISONE ACETATE 0.1 MG/1
0.1 TABLET ORAL ONCE
Status: COMPLETED | OUTPATIENT
Start: 2018-10-21 | End: 2018-10-22

## 2018-10-21 RX ADMIN — TAMSULOSIN HYDROCHLORIDE 0.4 MG: 0.4 CAPSULE ORAL at 08:45

## 2018-10-21 RX ADMIN — OXYCODONE HYDROCHLORIDE AND ACETAMINOPHEN 2 TABLET: 5; 325 TABLET ORAL at 09:37

## 2018-10-21 RX ADMIN — ISOSORBIDE MONONITRATE 30 MG: 30 TABLET ORAL at 08:46

## 2018-10-21 RX ADMIN — AMLODIPINE BESYLATE 5 MG: 5 TABLET ORAL at 08:42

## 2018-10-21 RX ADMIN — INSULIN LISPRO 12 UNITS: 100 INJECTION, SOLUTION INTRAVENOUS; SUBCUTANEOUS at 11:47

## 2018-10-21 RX ADMIN — HYDRALAZINE HYDROCHLORIDE 50 MG: 50 TABLET, FILM COATED ORAL at 07:07

## 2018-10-21 RX ADMIN — Medication 81 MG: at 08:46

## 2018-10-21 RX ADMIN — Medication 10 ML: at 08:48

## 2018-10-21 RX ADMIN — CARVEDILOL 25 MG: 25 TABLET, FILM COATED ORAL at 17:29

## 2018-10-21 RX ADMIN — PREGABALIN 50 MG: 50 CAPSULE ORAL at 15:01

## 2018-10-21 RX ADMIN — PREDNISONE 40 MG: 20 TABLET ORAL at 08:43

## 2018-10-21 RX ADMIN — AMPICILLIN SODIUM 500 MG: 500 INJECTION, POWDER, FOR SOLUTION INTRAMUSCULAR; INTRAVENOUS at 08:43

## 2018-10-21 RX ADMIN — PREGABALIN 50 MG: 50 CAPSULE ORAL at 08:48

## 2018-10-21 RX ADMIN — HYDRALAZINE HYDROCHLORIDE 50 MG: 50 TABLET, FILM COATED ORAL at 15:00

## 2018-10-21 RX ADMIN — INSULIN LISPRO 9 UNITS: 100 INJECTION, SOLUTION INTRAVENOUS; SUBCUTANEOUS at 09:00

## 2018-10-21 RX ADMIN — INSULIN HUMAN 10 UNITS: 100 INJECTION, SOLUTION PARENTERAL at 07:55

## 2018-10-21 RX ADMIN — INSULIN LISPRO 6 UNITS: 100 INJECTION, SOLUTION INTRAVENOUS; SUBCUTANEOUS at 17:30

## 2018-10-21 RX ADMIN — INSULIN GLARGINE 20 UNITS: 100 INJECTION, SOLUTION SUBCUTANEOUS at 20:55

## 2018-10-21 RX ADMIN — OXYCODONE HYDROCHLORIDE AND ACETAMINOPHEN 2 TABLET: 5; 325 TABLET ORAL at 01:30

## 2018-10-21 RX ADMIN — PANTOPRAZOLE 20 MG: 20 TABLET, DELAYED RELEASE ORAL at 08:47

## 2018-10-21 RX ADMIN — AMPICILLIN SODIUM 500 MG: 500 INJECTION, POWDER, FOR SOLUTION INTRAMUSCULAR; INTRAVENOUS at 14:59

## 2018-10-21 RX ADMIN — SUCRALFATE 1 G: 1 TABLET ORAL at 08:43

## 2018-10-21 RX ADMIN — Medication 10 ML: at 08:49

## 2018-10-21 RX ADMIN — CLOPIDOGREL 75 MG: 75 TABLET, FILM COATED ORAL at 08:45

## 2018-10-21 RX ADMIN — INSULIN GLARGINE 20 UNITS: 100 INJECTION, SOLUTION SUBCUTANEOUS at 09:27

## 2018-10-21 RX ADMIN — ATORVASTATIN CALCIUM 80 MG: 80 TABLET, FILM COATED ORAL at 20:51

## 2018-10-21 RX ADMIN — Medication 13.05 UNITS/KG/HR: at 17:46

## 2018-10-21 RX ADMIN — CARVEDILOL 25 MG: 25 TABLET, FILM COATED ORAL at 08:47

## 2018-10-21 RX ADMIN — Medication 18 UNITS/HR: at 07:56

## 2018-10-21 RX ADMIN — OXYCODONE HYDROCHLORIDE AND ACETAMINOPHEN 2 TABLET: 5; 325 TABLET ORAL at 17:45

## 2018-10-21 RX ADMIN — WARFARIN SODIUM 10 MG: 5 TABLET ORAL at 17:29

## 2018-10-21 RX ADMIN — FUROSEMIDE 40 MG: 40 TABLET ORAL at 17:35

## 2018-10-21 RX ADMIN — SUCRALFATE 1 G: 1 TABLET ORAL at 13:00

## 2018-10-21 RX ADMIN — PREGABALIN 50 MG: 50 CAPSULE ORAL at 20:51

## 2018-10-21 RX ADMIN — DEXTROSE MONOHYDRATE 25 G: 25 INJECTION, SOLUTION INTRAVENOUS at 07:56

## 2018-10-21 RX ADMIN — HYDRALAZINE HYDROCHLORIDE 50 MG: 50 TABLET, FILM COATED ORAL at 20:51

## 2018-10-21 RX ADMIN — INSULIN LISPRO 5 UNITS: 100 INJECTION, SOLUTION INTRAVENOUS; SUBCUTANEOUS at 20:53

## 2018-10-21 RX ADMIN — AMPICILLIN SODIUM 500 MG: 500 INJECTION, POWDER, FOR SOLUTION INTRAMUSCULAR; INTRAVENOUS at 03:09

## 2018-10-21 RX ADMIN — AMPICILLIN SODIUM 500 MG: 500 INJECTION, POWDER, FOR SOLUTION INTRAMUSCULAR; INTRAVENOUS at 20:56

## 2018-10-21 ASSESSMENT — PAIN SCALES - GENERAL
PAINLEVEL_OUTOF10: 8
PAINLEVEL_OUTOF10: 8

## 2018-10-21 ASSESSMENT — PAIN DESCRIPTION - ONSET: ONSET: ON-GOING

## 2018-10-21 ASSESSMENT — PAIN DESCRIPTION - LOCATION: LOCATION: SCROTUM

## 2018-10-21 ASSESSMENT — PAIN DESCRIPTION - PROGRESSION: CLINICAL_PROGRESSION: NOT CHANGED

## 2018-10-21 ASSESSMENT — PAIN DESCRIPTION - DESCRIPTORS: DESCRIPTORS: ACHING;DISCOMFORT

## 2018-10-21 ASSESSMENT — PAIN DESCRIPTION - PAIN TYPE: TYPE: ACUTE PAIN

## 2018-10-21 ASSESSMENT — PAIN DESCRIPTION - FREQUENCY: FREQUENCY: CONTINUOUS

## 2018-10-21 NOTE — PROGRESS NOTES
[address] [facility]    Progress Note    10/21/2018    11:34 PM    Name:   Kiki Kc  MRN:     [id number]     Acct:      [de-identified]   Room:   [address] [place] [address] Day:  6  Admit Date:  10/14/2018 11:18 PM    PCP:   No primary care provider on file. Code Status:  Full Code    Subjective:     C/C:   Chief Complaint   Patient presents with    Emesis     started 11am    Palpitations     Interval History Status: worsened    Patient has much less joint pain today after he received the IV Solumedrol yesterday for his RA flare. He has some anxiety about all of his medical issues. He used to be in snf for 10 years and go to [address] [place] [address] for his DM2 and right foot infections. He really wants to get a handle on all of his medical conditions. He also thinks that his scrotal hernia is enlarging and more painful. He denies any chest pain, fever or chills. Brief History:     The patient is a 45 y.o.  AA  male who presents with Emesis (started 11am) and Palpitations   and he is admitted to the hospital for the management of  SVT and elevated troponins.     Patient had multiple episodes of N/V and abdominal pain at home over the last day that prompted him to go the ED. He also had some chest pain and was found to be in SVT in the ED. He was given several doses of adenosine which converted him back to a sinus rhythm. His  Mother  at 46 of an MI, his sister  at 61 of an MI and his father  in his [de-identified] of heart disease.       He thought he may have pancreatitis due to his abd pain. Lipase was normal.  Trop elevated and patient was taken to cath lab by Cardiology for an Nstemi. He received a BMS to the LAD on 10/17. Urology and General surgery consulted for large inguinal hernia with multiple bowel loops. He also has a Left gastroc DVT on heparin and is bridging on Coumadin.     Review of Systems:     Constitutional:  negative for chills, fevers,

## 2018-10-21 NOTE — PROGRESS NOTES
pain in scrotum from hernia. Pt has scrotum elevated on pillow, states he \"doesn't want to move. I'll do bed exercises\". General Comment  Comments: Pt left in bed with call light within reach. Pain Screening  Patient Currently in Pain: Yes  Pain Assessment  Pain Assessment: 0-10  Pain Level: 8  Pain Type: Acute pain  Pain Location: Scrotum  Pain Descriptors: Aching;Discomfort  Pain Frequency: Continuous  Pain Onset: On-going  Clinical Progression: Not changed  Pain Intervention(s): Ambulation/Increased activity; Therapeutic presence  Response to Pain Intervention: Patient Satisfied  Vital Signs  Patient Currently in Pain: Yes       Orientation  Orientation  Overall Orientation Status: Within Normal Limits  Objective   Bed mobility  Supine to Sit: Unable to assess  Comment: pt only willing to to bed exs at this time. States \"I can't stand to get up without my sweatpants supporting my hernia\". Pt states his daughter will be in later today to take his pants and she will return them prior to PT tomorrow. Transfers  Sit to Stand: Unable to assess  Ambulation  Ambulation?: No        Exercises  Supine Exercises: Ankle Pumps, Gluteal sets, Hamstring Sets, Quad Sets, SAQ, SLR. Reps: 10-15x   Upper extremity exercises: Bicep curl, shoulder flexion/extension, punches, tricep curl, shoulder abduction/adduction. Reps: 10x with therapist providing resistance per pt tolerance   Comments: pt educated to continue there exs (supine) per tolerance         Assessment   Body structures, Functions, Activity limitations: Decreased functional mobility ; Decreased strength;Decreased balance;Decreased endurance  Assessment: Pt. (prev)amb. w/' x 2 CGA. Agreeable to supine exs only this date, c/o increased pain in scrotum from hernia. Will continue to assess for d/c rec pending progress with PT.   Prognosis: Good  Patient Education: importance of amb and ther exercises  REQUIRES PT FOLLOW UP: Yes  Activity Tolerance  Activity Tolerance:

## 2018-10-21 NOTE — PLAN OF CARE
Problem: Risk for Falls  Goal: Safety behavior - fall prevention  Patient and caregivers take actions to minimize risk factors that  might lead to falls.      Outcome: Ongoing

## 2018-10-22 LAB
ANION GAP SERPL CALCULATED.3IONS-SCNC: 10 MMOL/L (ref 9–17)
BUN BLDV-MCNC: 48 MG/DL (ref 6–20)
BUN/CREAT BLD: ABNORMAL (ref 9–20)
CALCIUM IONIZED: 1.23 MMOL/L (ref 1.13–1.33)
CALCIUM SERPL-MCNC: 8.9 MG/DL (ref 8.6–10.4)
CHLORIDE BLD-SCNC: 96 MMOL/L (ref 98–107)
CO2: 28 MMOL/L (ref 20–31)
CREAT SERPL-MCNC: 1.96 MG/DL (ref 0.7–1.2)
EKG ATRIAL RATE: 78 BPM
EKG P AXIS: 59 DEGREES
EKG P-R INTERVAL: 196 MS
EKG Q-T INTERVAL: 390 MS
EKG QRS DURATION: 144 MS
EKG QTC CALCULATION (BAZETT): 444 MS
EKG R AXIS: 14 DEGREES
EKG T AXIS: 59 DEGREES
EKG VENTRICULAR RATE: 78 BPM
GFR AFRICAN AMERICAN: 43 ML/MIN
GFR NON-AFRICAN AMERICAN: 36 ML/MIN
GFR SERPL CREATININE-BSD FRML MDRD: ABNORMAL ML/MIN/{1.73_M2}
GFR SERPL CREATININE-BSD FRML MDRD: ABNORMAL ML/MIN/{1.73_M2}
GLUCOSE BLD-MCNC: 227 MG/DL (ref 75–110)
GLUCOSE BLD-MCNC: 228 MG/DL (ref 75–110)
GLUCOSE BLD-MCNC: 299 MG/DL (ref 75–110)
GLUCOSE BLD-MCNC: 303 MG/DL (ref 75–110)
GLUCOSE BLD-MCNC: 304 MG/DL (ref 75–110)
GLUCOSE BLD-MCNC: 345 MG/DL (ref 70–99)
INR BLD: 2.1
MAGNESIUM: 1.7 MG/DL (ref 1.6–2.6)
PARTIAL THROMBOPLASTIN TIME: 49.3 SEC (ref 20.5–30.5)
PARTIAL THROMBOPLASTIN TIME: 68.7 SEC (ref 20.5–30.5)
PARTIAL THROMBOPLASTIN TIME: 99.5 SEC (ref 20.5–30.5)
PLATELET # BLD: NORMAL K/UL (ref 138–453)
PLATELET, FLUORESCENCE: NORMAL K/UL (ref 138–453)
PLATELET, IMMATURE FRACTION: NORMAL % (ref 1.1–10.3)
POTASSIUM SERPL-SCNC: 5.1 MMOL/L (ref 3.7–5.3)
PROTHROMBIN TIME: 21.4 SEC (ref 9–12)
SODIUM BLD-SCNC: 134 MMOL/L (ref 135–144)

## 2018-10-22 PROCEDURE — 6360000002 HC RX W HCPCS: Performed by: NURSE PRACTITIONER

## 2018-10-22 PROCEDURE — 6370000000 HC RX 637 (ALT 250 FOR IP): Performed by: INTERNAL MEDICINE

## 2018-10-22 PROCEDURE — 93005 ELECTROCARDIOGRAM TRACING: CPT

## 2018-10-22 PROCEDURE — 85055 RETICULATED PLATELET ASSAY: CPT

## 2018-10-22 PROCEDURE — 36415 COLL VENOUS BLD VENIPUNCTURE: CPT

## 2018-10-22 PROCEDURE — 6370000000 HC RX 637 (ALT 250 FOR IP): Performed by: NURSE PRACTITIONER

## 2018-10-22 PROCEDURE — 99232 SBSQ HOSP IP/OBS MODERATE 35: CPT | Performed by: INTERNAL MEDICINE

## 2018-10-22 PROCEDURE — 97116 GAIT TRAINING THERAPY: CPT

## 2018-10-22 PROCEDURE — 82947 ASSAY GLUCOSE BLOOD QUANT: CPT

## 2018-10-22 PROCEDURE — 97110 THERAPEUTIC EXERCISES: CPT

## 2018-10-22 PROCEDURE — 82330 ASSAY OF CALCIUM: CPT

## 2018-10-22 PROCEDURE — 2580000003 HC RX 258: Performed by: STUDENT IN AN ORGANIZED HEALTH CARE EDUCATION/TRAINING PROGRAM

## 2018-10-22 PROCEDURE — 2060000000 HC ICU INTERMEDIATE R&B

## 2018-10-22 PROCEDURE — 76937 US GUIDE VASCULAR ACCESS: CPT

## 2018-10-22 PROCEDURE — 85610 PROTHROMBIN TIME: CPT

## 2018-10-22 PROCEDURE — 6370000000 HC RX 637 (ALT 250 FOR IP): Performed by: STUDENT IN AN ORGANIZED HEALTH CARE EDUCATION/TRAINING PROGRAM

## 2018-10-22 PROCEDURE — 83735 ASSAY OF MAGNESIUM: CPT

## 2018-10-22 PROCEDURE — 2580000003 HC RX 258: Performed by: NURSE PRACTITIONER

## 2018-10-22 PROCEDURE — 2580000003 HC RX 258: Performed by: INTERNAL MEDICINE

## 2018-10-22 PROCEDURE — 97535 SELF CARE MNGMENT TRAINING: CPT

## 2018-10-22 PROCEDURE — 6360000002 HC RX W HCPCS: Performed by: INTERNAL MEDICINE

## 2018-10-22 PROCEDURE — 85730 THROMBOPLASTIN TIME PARTIAL: CPT

## 2018-10-22 PROCEDURE — 85049 AUTOMATED PLATELET COUNT: CPT

## 2018-10-22 PROCEDURE — 80048 BASIC METABOLIC PNL TOTAL CA: CPT

## 2018-10-22 RX ADMIN — INSULIN HUMAN 10 UNITS: 100 INJECTION, SOLUTION PARENTERAL at 01:04

## 2018-10-22 RX ADMIN — HYDRALAZINE HYDROCHLORIDE 50 MG: 50 TABLET, FILM COATED ORAL at 20:04

## 2018-10-22 RX ADMIN — HEPARIN SODIUM 5000 UNITS: 1000 INJECTION, SOLUTION INTRAVENOUS; SUBCUTANEOUS at 15:36

## 2018-10-22 RX ADMIN — OXYCODONE HYDROCHLORIDE AND ACETAMINOPHEN 2 TABLET: 5; 325 TABLET ORAL at 20:42

## 2018-10-22 RX ADMIN — FLUDROCORTISONE ACETATE 0.1 MG: 0.1 TABLET ORAL at 01:03

## 2018-10-22 RX ADMIN — AMPICILLIN SODIUM 500 MG: 500 INJECTION, POWDER, FOR SOLUTION INTRAMUSCULAR; INTRAVENOUS at 15:15

## 2018-10-22 RX ADMIN — INSULIN LISPRO 3 UNITS: 100 INJECTION, SOLUTION INTRAVENOUS; SUBCUTANEOUS at 20:08

## 2018-10-22 RX ADMIN — SUCRALFATE 1 G: 1 TABLET ORAL at 08:48

## 2018-10-22 RX ADMIN — AMLODIPINE BESYLATE 5 MG: 5 TABLET ORAL at 08:49

## 2018-10-22 RX ADMIN — Medication 10 ML: at 20:20

## 2018-10-22 RX ADMIN — AMPICILLIN SODIUM 500 MG: 500 INJECTION, POWDER, FOR SOLUTION INTRAMUSCULAR; INTRAVENOUS at 03:16

## 2018-10-22 RX ADMIN — PREGABALIN 50 MG: 50 CAPSULE ORAL at 14:18

## 2018-10-22 RX ADMIN — AMPICILLIN SODIUM 500 MG: 500 INJECTION, POWDER, FOR SOLUTION INTRAMUSCULAR; INTRAVENOUS at 08:48

## 2018-10-22 RX ADMIN — Medication 81 MG: at 08:49

## 2018-10-22 RX ADMIN — HYDRALAZINE HYDROCHLORIDE 50 MG: 50 TABLET, FILM COATED ORAL at 14:18

## 2018-10-22 RX ADMIN — FERROUS SULFATE TAB EC 325 MG (65 MG FE EQUIVALENT) 325 MG: 325 (65 FE) TABLET DELAYED RESPONSE at 08:49

## 2018-10-22 RX ADMIN — PREDNISONE 40 MG: 20 TABLET ORAL at 08:48

## 2018-10-22 RX ADMIN — AMPICILLIN SODIUM 500 MG: 500 INJECTION, POWDER, FOR SOLUTION INTRAMUSCULAR; INTRAVENOUS at 20:21

## 2018-10-22 RX ADMIN — PREGABALIN 50 MG: 50 CAPSULE ORAL at 08:49

## 2018-10-22 RX ADMIN — SUCRALFATE 1 G: 1 TABLET ORAL at 20:04

## 2018-10-22 RX ADMIN — OXYCODONE HYDROCHLORIDE AND ACETAMINOPHEN 2 TABLET: 5; 325 TABLET ORAL at 01:09

## 2018-10-22 RX ADMIN — INSULIN GLARGINE 40 UNITS: 100 INJECTION, SOLUTION SUBCUTANEOUS at 08:49

## 2018-10-22 RX ADMIN — TAMSULOSIN HYDROCHLORIDE 0.4 MG: 0.4 CAPSULE ORAL at 08:49

## 2018-10-22 RX ADMIN — ALPRAZOLAM 0.5 MG: 0.5 TABLET ORAL at 01:04

## 2018-10-22 RX ADMIN — ALPRAZOLAM 0.5 MG: 0.5 TABLET ORAL at 09:45

## 2018-10-22 RX ADMIN — INSULIN LISPRO 9 UNITS: 100 INJECTION, SOLUTION INTRAVENOUS; SUBCUTANEOUS at 08:49

## 2018-10-22 RX ADMIN — MAGNESIUM SULFATE HEPTAHYDRATE 1 G: 1 INJECTION, SOLUTION INTRAVENOUS at 06:40

## 2018-10-22 RX ADMIN — INSULIN LISPRO 12 UNITS: 100 INJECTION, SOLUTION INTRAVENOUS; SUBCUTANEOUS at 14:18

## 2018-10-22 RX ADMIN — Medication 9 UNITS/KG/HR: at 06:46

## 2018-10-22 RX ADMIN — INSULIN LISPRO 6 UNITS: 100 INJECTION, SOLUTION INTRAVENOUS; SUBCUTANEOUS at 16:42

## 2018-10-22 RX ADMIN — DEXTROSE MONOHYDRATE 25 G: 25 INJECTION, SOLUTION INTRAVENOUS at 01:04

## 2018-10-22 RX ADMIN — PANTOPRAZOLE 20 MG: 20 TABLET, DELAYED RELEASE ORAL at 08:49

## 2018-10-22 RX ADMIN — INSULIN GLARGINE 40 UNITS: 100 INJECTION, SOLUTION SUBCUTANEOUS at 20:04

## 2018-10-22 RX ADMIN — FUROSEMIDE 40 MG: 40 TABLET ORAL at 08:48

## 2018-10-22 RX ADMIN — ATORVASTATIN CALCIUM 80 MG: 80 TABLET, FILM COATED ORAL at 20:04

## 2018-10-22 RX ADMIN — PREGABALIN 50 MG: 50 CAPSULE ORAL at 20:04

## 2018-10-22 RX ADMIN — ISOSORBIDE MONONITRATE 30 MG: 30 TABLET ORAL at 08:49

## 2018-10-22 RX ADMIN — CARVEDILOL 25 MG: 25 TABLET, FILM COATED ORAL at 16:42

## 2018-10-22 RX ADMIN — OXYCODONE HYDROCHLORIDE AND ACETAMINOPHEN 2 TABLET: 5; 325 TABLET ORAL at 11:11

## 2018-10-22 RX ADMIN — CARVEDILOL 25 MG: 25 TABLET, FILM COATED ORAL at 08:49

## 2018-10-22 RX ADMIN — CLOPIDOGREL 75 MG: 75 TABLET, FILM COATED ORAL at 08:49

## 2018-10-22 RX ADMIN — MAGNESIUM SULFATE HEPTAHYDRATE 1 G: 1 INJECTION, SOLUTION INTRAVENOUS at 05:28

## 2018-10-22 RX ADMIN — SODIUM POLYSTYRENE SULFONATE 15 G: 15 SUSPENSION ORAL; RECTAL at 01:03

## 2018-10-22 RX ADMIN — HYDRALAZINE HYDROCHLORIDE 50 MG: 50 TABLET, FILM COATED ORAL at 06:38

## 2018-10-22 ASSESSMENT — PAIN SCALES - GENERAL
PAINLEVEL_OUTOF10: 8
PAINLEVEL_OUTOF10: 7
PAINLEVEL_OUTOF10: 8
PAINLEVEL_OUTOF10: 7

## 2018-10-22 ASSESSMENT — PAIN DESCRIPTION - PROGRESSION
CLINICAL_PROGRESSION: NOT CHANGED
CLINICAL_PROGRESSION: NOT CHANGED

## 2018-10-22 ASSESSMENT — PAIN DESCRIPTION - FREQUENCY: FREQUENCY: CONTINUOUS

## 2018-10-22 ASSESSMENT — PAIN DESCRIPTION - LOCATION
LOCATION: SCROTUM
LOCATION: SCROTUM

## 2018-10-22 ASSESSMENT — PAIN DESCRIPTION - PAIN TYPE: TYPE: ACUTE PAIN

## 2018-10-22 ASSESSMENT — PAIN DESCRIPTION - ONSET: ONSET: ON-GOING

## 2018-10-22 ASSESSMENT — PAIN DESCRIPTION - DESCRIPTORS: DESCRIPTORS: SHARP

## 2018-10-22 NOTE — PROGRESS NOTES
COPD (chronic obstructive pulmonary disease) ([facility] Dysphagia; Equinus contracture of right ankle; Foot infection; Hand swelling; HTN (hypertension); Hyperglycemia without ketosis; Hyperkalemia; Leukocytosis; Mixed hyperlipidemia; MRSA infection; Nausea & vomiting; Neutrophilic leukocytosis; Osteomyelitis ([facility] Osteomyelitis of foot ([facility] Painful orthopaedic hardware [facility]); Scrotum swelling; Septic arthritis ([facility] Septic arthritis of wrist, left ([facility] Sickle-cell trait ([facility] Skin ulcers of foot, bilateral ([facility] Steroid-induced hyperglycemia; Streptococcal arthritis of left wrist ([facility] Type 2 diabetes mellitus, uncontrolled ([facility] Uncontrolled type 2 diabetes mellitus with diabetic nephropathy, with long-term current use of insulin ([facility] and Uncontrolled type 2 diabetes mellitus with retinopathy, with long-term current use of insulin ([facility] Social History:   reports that he quit smoking about 9 months ago. He has never used smokeless tobacco. He reports that he does not drink alcohol or use drugs. Family History:   Family History   Problem Relation Age of Onset    No Known Problems Mother     No Known Problems Father     No Known Problems Sister        Vitals:  /65   Pulse 82   Temp 97.7 °F (36.5 °C) (Oral)   Resp 18   Ht 6' 4\" (1.93 m)   Wt (!) 369 lb 4.3 oz (167.5 kg)   SpO2 96%   BMI 44.95 kg/m²   Temp (24hrs), Av.4 °F (36.3 °C), Min:97.1 °F (36.2 °C), Max:97.7 °F (36.5 °C)    Recent Labs      10/21/18   2045  10/22/18   0619  10/22/18   1407  10/22/18   1542   POCGLU  273*  299*  303*  304*       I/O (24Hr):     Intake/Output Summary (Last 24 hours) at 10/22/18 1601  Last data filed at 10/22/18 0500   Gross per 24 hour   Intake             1778 ml   Output             1175 ml   Net              603 ml       Labs:    Hematology:  Recent Labs      10/20/18   0435  10/21/18   0348  10/22/18   0356   PLT  290  256  See Reflexed IPF Result   INR  1.1  1.3  2.1     Chemistry:  Recent

## 2018-10-22 NOTE — PROGRESS NOTES
Yes  Response To Previous Treatment: Patient with no complaints from previous session. Family / Caregiver Present: No  Subjective  Subjective: Pt and RN agreeable to PT. Pt supine in bed upon arrival, c/o pain in scrotum from hernia. General Comment  Comments: Pt left in bed with call light within reach. Pain Screening  Patient Currently in Pain: Yes  Pain Assessment  Pain Assessment: 0-10  Pain Level: 8  Pain Type: Acute pain  Pain Location: Scrotum  Pain Descriptors: Sharp  Pain Frequency: Continuous (increasing while standing)  Pain Onset: On-going  Clinical Progression: Not changed  Pain Intervention(s): Ambulation/Increased activity; Therapeutic presence  Response to Pain Intervention: Patient Satisfied  Vital Signs  Patient Currently in Pain: Yes       Orientation  Orientation  Overall Orientation Status: Within Normal Limits  Objective   Bed mobility  Rolling to Left: Supervision  Rolling to Right: Supervision  Supine to Sit: Supervision  Sit to Supine: Supervision  Scooting: Supervision  Comment: increased time and effort needed to complete bed mob d/t pain in scrotum  Transfers  Sit to Stand: Stand by assistance  Stand to sit: Stand by assistance  Comment: good safety awareness, steady  Ambulation  Ambulation?: Yes  Ambulation 1  Surface: level tile  Device: Rolling Walker  Other Apparatus:  (IV pole)  Assistance: Stand by assistance  Quality of Gait: slow pace,wide MARIE  Distance: 15ft x2  Comments: pt declined further amb d/t abdominal/scrotal pain, pt demo's good safety awareness and balance while ambulating within room. Most limited at this time by scrotal swelling and pain. Stairs/Curb  Stairs?: No     Balance  Posture: Good  Sitting - Static: Good  Sitting - Dynamic: Good (EOB ~6 mins prior to amb)  Standing - Static: Good;-  Standing - Dynamic: Good;-  Comments: standing balance assessed w/ RW  Exercises  Supine Exercises:  Ankle Pumps, Gluteal sets, Hamstring Sets, Heel Slides, Hip ABD/ADD,  Felicita Shan Sets. Reps: 10x   HEP for B UE exs given and pt issued green theraband, all questions answered at this time  Comments:  ROM for B LE exs limited d/t bady habitus and scrotal swelling        Assessment   Body structures, Functions, Activity limitations: Decreased functional mobility ; Decreased strength;Decreased balance;Decreased endurance  Assessment: Pt. (prev)amb. w/' x 2 CGA. Able to amb with SBA and RW 15ft x2 this date d/t c/o increased pain in scrotum from hernia. Pt would benefit from short SNF stay at discharge prior to safe return home. Pt currently stating he wishes to return home, would need bariatric RW and 24hr assist.  Prognosis: Good  Patient Education: importance of amb and ther exercises  REQUIRES PT FOLLOW UP: Yes  Activity Tolerance  Activity Tolerance: Patient limited by fatigue;Patient limited by endurance; Patient limited by pain  Activity Tolerance: mobility most limited by size of hernia and pt report of pain while standing     Goals  Short term goals  Time Frame for Short term goals: 14 visits  Short term goal 1: Independent bed mobility  Short term goal 2: Independent transfers  Short term goal 3: Ambulate 400 ft with RW independently  Short term goal 4: Tolerate 30 minutes of exercise to improve endurance  Short term goal 5: Independent with home exercise program  Patient Goals   Patient goals :  To walk without getting tired    Plan    Plan  Times per week: 5x/week  Times per day: Daily  Current Treatment Recommendations: Strengthening, Balance Training, Gait Training, Home Exercise Program, Endurance Training, Safety Education & Training  Safety Devices  Type of devices: Call light within reach, Nurse notified, Left in bed, All fall risk precautions in place, Gait belt  Restraints  Initially in place: No     Therapy Time   Individual Concurrent Group Co-treatment   Time In 1129         Time Out 1155         Minutes [address]

## 2018-10-23 LAB
ANION GAP SERPL CALCULATED.3IONS-SCNC: 13 MMOL/L (ref 9–17)
BUN BLDV-MCNC: 54 MG/DL (ref 6–20)
BUN/CREAT BLD: ABNORMAL (ref 9–20)
CALCIUM SERPL-MCNC: 8.9 MG/DL (ref 8.6–10.4)
CHLORIDE BLD-SCNC: 100 MMOL/L (ref 98–107)
CO2: 28 MMOL/L (ref 20–31)
CREAT SERPL-MCNC: 1.77 MG/DL (ref 0.7–1.2)
GFR AFRICAN AMERICAN: 49 ML/MIN
GFR NON-AFRICAN AMERICAN: 40 ML/MIN
GFR SERPL CREATININE-BSD FRML MDRD: ABNORMAL ML/MIN/{1.73_M2}
GFR SERPL CREATININE-BSD FRML MDRD: ABNORMAL ML/MIN/{1.73_M2}
GLUCOSE BLD-MCNC: 239 MG/DL (ref 75–110)
GLUCOSE BLD-MCNC: 279 MG/DL (ref 75–110)
GLUCOSE BLD-MCNC: 284 MG/DL (ref 75–110)
GLUCOSE BLD-MCNC: 298 MG/DL (ref 75–110)
GLUCOSE BLD-MCNC: 306 MG/DL (ref 70–99)
INR BLD: 2.6
PARTIAL THROMBOPLASTIN TIME: 68.1 SEC (ref 20.5–30.5)
PLATELET # BLD: 275 K/UL (ref 138–453)
POTASSIUM SERPL-SCNC: 5.4 MMOL/L (ref 3.7–5.3)
POTASSIUM SERPL-SCNC: 5.4 MMOL/L (ref 3.7–5.3)
PROTHROMBIN TIME: 26.1 SEC (ref 9–12)
SODIUM BLD-SCNC: 141 MMOL/L (ref 135–144)

## 2018-10-23 PROCEDURE — 6360000002 HC RX W HCPCS: Performed by: NURSE PRACTITIONER

## 2018-10-23 PROCEDURE — 6370000000 HC RX 637 (ALT 250 FOR IP): Performed by: NURSE PRACTITIONER

## 2018-10-23 PROCEDURE — 2060000000 HC ICU INTERMEDIATE R&B

## 2018-10-23 PROCEDURE — 6370000000 HC RX 637 (ALT 250 FOR IP): Performed by: INTERNAL MEDICINE

## 2018-10-23 PROCEDURE — 82947 ASSAY GLUCOSE BLOOD QUANT: CPT

## 2018-10-23 PROCEDURE — 85049 AUTOMATED PLATELET COUNT: CPT

## 2018-10-23 PROCEDURE — 2580000003 HC RX 258: Performed by: NURSE PRACTITIONER

## 2018-10-23 PROCEDURE — 6370000000 HC RX 637 (ALT 250 FOR IP): Performed by: STUDENT IN AN ORGANIZED HEALTH CARE EDUCATION/TRAINING PROGRAM

## 2018-10-23 PROCEDURE — 36415 COLL VENOUS BLD VENIPUNCTURE: CPT

## 2018-10-23 PROCEDURE — 2580000003 HC RX 258: Performed by: STUDENT IN AN ORGANIZED HEALTH CARE EDUCATION/TRAINING PROGRAM

## 2018-10-23 PROCEDURE — 6360000002 HC RX W HCPCS: Performed by: INTERNAL MEDICINE

## 2018-10-23 PROCEDURE — 99232 SBSQ HOSP IP/OBS MODERATE 35: CPT | Performed by: INTERNAL MEDICINE

## 2018-10-23 PROCEDURE — 84132 ASSAY OF SERUM POTASSIUM: CPT

## 2018-10-23 PROCEDURE — 85610 PROTHROMBIN TIME: CPT

## 2018-10-23 PROCEDURE — 6370000000 HC RX 637 (ALT 250 FOR IP)

## 2018-10-23 PROCEDURE — 80048 BASIC METABOLIC PNL TOTAL CA: CPT

## 2018-10-23 PROCEDURE — 2700000000 HC OXYGEN THERAPY PER DAY

## 2018-10-23 PROCEDURE — 85730 THROMBOPLASTIN TIME PARTIAL: CPT

## 2018-10-23 RX ORDER — FUROSEMIDE 40 MG/1
80 TABLET ORAL 2 TIMES DAILY
Status: DISCONTINUED | OUTPATIENT
Start: 2018-10-24 | End: 2018-10-24 | Stop reason: HOSPADM

## 2018-10-23 RX ORDER — FUROSEMIDE 20 MG/1
TABLET ORAL
Status: COMPLETED
Start: 2018-10-23 | End: 2018-10-23

## 2018-10-23 RX ORDER — BENZONATATE 100 MG/1
100 CAPSULE ORAL 3 TIMES DAILY PRN
Status: DISCONTINUED | OUTPATIENT
Start: 2018-10-23 | End: 2018-10-24 | Stop reason: HOSPADM

## 2018-10-23 RX ORDER — WARFARIN SODIUM 2.5 MG/1
2.5 TABLET ORAL
Status: COMPLETED | OUTPATIENT
Start: 2018-10-23 | End: 2018-10-23

## 2018-10-23 RX ADMIN — AMPICILLIN SODIUM 500 MG: 500 INJECTION, POWDER, FOR SOLUTION INTRAMUSCULAR; INTRAVENOUS at 16:30

## 2018-10-23 RX ADMIN — PREGABALIN 50 MG: 50 CAPSULE ORAL at 14:05

## 2018-10-23 RX ADMIN — TAMSULOSIN HYDROCHLORIDE 0.4 MG: 0.4 CAPSULE ORAL at 09:34

## 2018-10-23 RX ADMIN — FUROSEMIDE 80 MG: 20 TABLET ORAL at 18:20

## 2018-10-23 RX ADMIN — INSULIN LISPRO 9 UNITS: 100 INJECTION, SOLUTION INTRAVENOUS; SUBCUTANEOUS at 09:35

## 2018-10-23 RX ADMIN — PREGABALIN 50 MG: 50 CAPSULE ORAL at 21:05

## 2018-10-23 RX ADMIN — CARVEDILOL 25 MG: 25 TABLET, FILM COATED ORAL at 09:34

## 2018-10-23 RX ADMIN — DOCUSATE SODIUM - SENNOSIDES 2 TABLET: 50; 8.6 TABLET, FILM COATED ORAL at 09:33

## 2018-10-23 RX ADMIN — INSULIN GLARGINE 40 UNITS: 100 INJECTION, SOLUTION SUBCUTANEOUS at 09:35

## 2018-10-23 RX ADMIN — AMPICILLIN SODIUM 500 MG: 500 INJECTION, POWDER, FOR SOLUTION INTRAMUSCULAR; INTRAVENOUS at 20:54

## 2018-10-23 RX ADMIN — HYDRALAZINE HYDROCHLORIDE 50 MG: 50 TABLET, FILM COATED ORAL at 05:45

## 2018-10-23 RX ADMIN — ALPRAZOLAM 0.5 MG: 0.5 TABLET ORAL at 20:54

## 2018-10-23 RX ADMIN — ALPRAZOLAM 0.5 MG: 0.5 TABLET ORAL at 09:46

## 2018-10-23 RX ADMIN — AMLODIPINE BESYLATE 5 MG: 5 TABLET ORAL at 09:32

## 2018-10-23 RX ADMIN — INSULIN LISPRO 5 UNITS: 100 INJECTION, SOLUTION INTRAVENOUS; SUBCUTANEOUS at 20:55

## 2018-10-23 RX ADMIN — PANTOPRAZOLE 20 MG: 20 TABLET, DELAYED RELEASE ORAL at 09:32

## 2018-10-23 RX ADMIN — AMPICILLIN SODIUM 500 MG: 500 INJECTION, POWDER, FOR SOLUTION INTRAMUSCULAR; INTRAVENOUS at 09:46

## 2018-10-23 RX ADMIN — FUROSEMIDE 40 MG: 40 TABLET ORAL at 09:33

## 2018-10-23 RX ADMIN — CARVEDILOL 25 MG: 25 TABLET, FILM COATED ORAL at 18:01

## 2018-10-23 RX ADMIN — PREGABALIN 50 MG: 50 CAPSULE ORAL at 09:32

## 2018-10-23 RX ADMIN — ISOSORBIDE MONONITRATE 30 MG: 30 TABLET ORAL at 09:32

## 2018-10-23 RX ADMIN — PREDNISONE 40 MG: 20 TABLET ORAL at 09:33

## 2018-10-23 RX ADMIN — SUCRALFATE 1 G: 1 TABLET ORAL at 21:00

## 2018-10-23 RX ADMIN — INSULIN LISPRO 9 UNITS: 100 INJECTION, SOLUTION INTRAVENOUS; SUBCUTANEOUS at 14:04

## 2018-10-23 RX ADMIN — HYDRALAZINE HYDROCHLORIDE 50 MG: 50 TABLET, FILM COATED ORAL at 14:05

## 2018-10-23 RX ADMIN — HYDRALAZINE HYDROCHLORIDE 50 MG: 50 TABLET, FILM COATED ORAL at 20:54

## 2018-10-23 RX ADMIN — FUROSEMIDE 80 MG: 40 TABLET ORAL at 18:20

## 2018-10-23 RX ADMIN — AMPICILLIN SODIUM 500 MG: 500 INJECTION, POWDER, FOR SOLUTION INTRAMUSCULAR; INTRAVENOUS at 03:12

## 2018-10-23 RX ADMIN — ATORVASTATIN CALCIUM 80 MG: 80 TABLET, FILM COATED ORAL at 20:54

## 2018-10-23 RX ADMIN — WARFARIN SODIUM 2.5 MG: 2.5 TABLET ORAL at 18:03

## 2018-10-23 RX ADMIN — Medication 11.08 UNITS/KG/HR: at 00:09

## 2018-10-23 RX ADMIN — CLOPIDOGREL 75 MG: 75 TABLET, FILM COATED ORAL at 09:34

## 2018-10-23 RX ADMIN — Medication 81 MG: at 09:32

## 2018-10-23 RX ADMIN — Medication 10 ML: at 09:35

## 2018-10-23 RX ADMIN — SUCRALFATE 1 G: 1 TABLET ORAL at 09:34

## 2018-10-23 RX ADMIN — INSULIN LISPRO 6 UNITS: 100 INJECTION, SOLUTION INTRAVENOUS; SUBCUTANEOUS at 18:02

## 2018-10-23 RX ADMIN — INSULIN GLARGINE 40 UNITS: 100 INJECTION, SOLUTION SUBCUTANEOUS at 20:55

## 2018-10-23 NOTE — PROGRESS NOTES
impaired venous return, likely  13. Evidence of pneumonia and chest x-ray    PLAN     1. On lasix 40 daily  2. Continue to monitor strict I's and O's and renal function   3. Will follow  4. Consider discharge once creatinine starts improving  5. Control blood sugars  6. Repeat potassium in the evening  7. Will follow       DR Gama Richardson MD  INTERNAL MEDICINE RESIDENT, PGY-1  [id number] [place] [address]  10/23/2018,10:23 AM     Attending Physician Statement  I have discussed the care of [place], including pertinent history and exam findings with the resident/fellow. I have reviewed the key elements of all parts of the encounter with the resident/fellow. I have seen and examined the patient with the resident/fellow. I agree with the assessment and plan and status of the problem list as documented. Addiitionally I recommend Discontinuing amlodipine in the setting of the severe edema. Await repeat potassium level. Increase Lasix dose to 80 mg oral twice daily in the setting of the severe edema. The patient is not yet ready for discharge.     Inge Young MD  Nephrology Attending Physician  [facility]

## 2018-10-23 NOTE — CARE COORDINATION
Transition planning [facility] called spoke to jaswant rice precert   76:15 received call from jaswant with [facility] have precert ps dr Hector Stafford not ready today await nephrology ok k still elevated

## 2018-10-23 NOTE — PLAN OF CARE
Problem: Pain:  Goal: Control of acute pain  Control of acute pain   Outcome: Ongoing    Goal: Control of chronic pain  Control of chronic pain   Outcome: Ongoing      Problem: Cardiac Output - Decreased:  Goal: Hemodynamic stability will improve  Hemodynamic stability will improve   Outcome: Ongoing      Problem: Musculor/Skeletal Functional Status  Goal: Highest potential functional level  Outcome: Ongoing    Goal: Absence of falls  Outcome: Ongoing

## 2018-10-23 NOTE — PROGRESS NOTES
Physical Therapy  DATE: 10/23/2018    NAME: Mario Carter  MRN: [id number]   : 1965    Patient not seen this date for Physical Therapy due to:  [] Blood transfusion in progress  [] Hemodialysis  [x]  Patient Declined---due to difficulty breathing, fatigue, pain   [] Spine Precautions   [] Strict Bedrest  [] Surgery/ Procedure  [] Testing      [] Other        [] PT being discontinued at this time. Patient independent. No further needs. [] PT being discontinued at this time as the patient has been transferred to palliative care. No further needs.     Link Bell, PTA

## 2018-10-24 VITALS
HEART RATE: 76 BPM | DIASTOLIC BLOOD PRESSURE: 68 MMHG | RESPIRATION RATE: 16 BRPM | WEIGHT: 315 LBS | TEMPERATURE: 98.2 F | OXYGEN SATURATION: 96 % | SYSTOLIC BLOOD PRESSURE: 134 MMHG | HEIGHT: 76 IN | BODY MASS INDEX: 38.36 KG/M2

## 2018-10-24 LAB
ANION GAP SERPL CALCULATED.3IONS-SCNC: 9 MMOL/L (ref 9–17)
BUN BLDV-MCNC: 53 MG/DL (ref 6–20)
BUN/CREAT BLD: ABNORMAL (ref 9–20)
CALCIUM SERPL-MCNC: 9 MG/DL (ref 8.6–10.4)
CHLORIDE BLD-SCNC: 101 MMOL/L (ref 98–107)
CO2: 31 MMOL/L (ref 20–31)
CREAT SERPL-MCNC: 1.61 MG/DL (ref 0.7–1.2)
GFR AFRICAN AMERICAN: 54 ML/MIN
GFR NON-AFRICAN AMERICAN: 45 ML/MIN
GFR SERPL CREATININE-BSD FRML MDRD: ABNORMAL ML/MIN/{1.73_M2}
GFR SERPL CREATININE-BSD FRML MDRD: ABNORMAL ML/MIN/{1.73_M2}
GLUCOSE BLD-MCNC: 163 MG/DL (ref 75–110)
GLUCOSE BLD-MCNC: 187 MG/DL (ref 75–110)
GLUCOSE BLD-MCNC: 222 MG/DL (ref 70–99)
GLUCOSE BLD-MCNC: 242 MG/DL (ref 75–110)
INR BLD: 2.2
PARTIAL THROMBOPLASTIN TIME: 31.4 SEC (ref 20.5–30.5)
PLATELET # BLD: 329 K/UL (ref 138–453)
POTASSIUM SERPL-SCNC: 4.9 MMOL/L (ref 3.7–5.3)
PROTHROMBIN TIME: 22.3 SEC (ref 9–12)
SODIUM BLD-SCNC: 141 MMOL/L (ref 135–144)

## 2018-10-24 PROCEDURE — 6370000000 HC RX 637 (ALT 250 FOR IP): Performed by: INTERNAL MEDICINE

## 2018-10-24 PROCEDURE — 6370000000 HC RX 637 (ALT 250 FOR IP): Performed by: NURSE PRACTITIONER

## 2018-10-24 PROCEDURE — 82947 ASSAY GLUCOSE BLOOD QUANT: CPT

## 2018-10-24 PROCEDURE — 99239 HOSP IP/OBS DSCHRG MGMT >30: CPT | Performed by: INTERNAL MEDICINE

## 2018-10-24 PROCEDURE — 94762 N-INVAS EAR/PLS OXIMTRY CONT: CPT

## 2018-10-24 PROCEDURE — 85610 PROTHROMBIN TIME: CPT

## 2018-10-24 PROCEDURE — 85049 AUTOMATED PLATELET COUNT: CPT

## 2018-10-24 PROCEDURE — 6360000002 HC RX W HCPCS: Performed by: NURSE PRACTITIONER

## 2018-10-24 PROCEDURE — 80048 BASIC METABOLIC PNL TOTAL CA: CPT

## 2018-10-24 PROCEDURE — 2580000003 HC RX 258: Performed by: STUDENT IN AN ORGANIZED HEALTH CARE EDUCATION/TRAINING PROGRAM

## 2018-10-24 PROCEDURE — 36415 COLL VENOUS BLD VENIPUNCTURE: CPT

## 2018-10-24 PROCEDURE — 2580000003 HC RX 258: Performed by: NURSE PRACTITIONER

## 2018-10-24 PROCEDURE — 94761 N-INVAS EAR/PLS OXIMETRY MLT: CPT

## 2018-10-24 PROCEDURE — 85730 THROMBOPLASTIN TIME PARTIAL: CPT

## 2018-10-24 PROCEDURE — 2580000003 HC RX 258: Performed by: EMERGENCY MEDICINE

## 2018-10-24 RX ORDER — ISOSORBIDE MONONITRATE 30 MG/1
30 TABLET, EXTENDED RELEASE ORAL DAILY
Qty: 30 TABLET | Refills: 3 | Status: SHIPPED | OUTPATIENT
Start: 2018-10-25

## 2018-10-24 RX ORDER — HYDRALAZINE HYDROCHLORIDE 50 MG/1
50 TABLET, FILM COATED ORAL EVERY 8 HOURS SCHEDULED
Qty: 90 TABLET | Refills: 3 | Status: SHIPPED | OUTPATIENT
Start: 2018-10-24

## 2018-10-24 RX ORDER — BENZONATATE 100 MG/1
100 CAPSULE ORAL 3 TIMES DAILY PRN
Qty: 30 CAPSULE | Refills: 0 | Status: ON HOLD | OUTPATIENT
Start: 2018-10-24 | End: 2018-10-31 | Stop reason: HOSPADM

## 2018-10-24 RX ORDER — WARFARIN SODIUM 6 MG/1
6 TABLET ORAL DAILY
Qty: 30 TABLET | Refills: 3 | Status: SHIPPED | OUTPATIENT
Start: 2018-10-24

## 2018-10-24 RX ORDER — ATORVASTATIN CALCIUM 80 MG/1
80 TABLET, FILM COATED ORAL NIGHTLY
Qty: 30 TABLET | Refills: 3 | Status: SHIPPED | OUTPATIENT
Start: 2018-10-24

## 2018-10-24 RX ORDER — CLOPIDOGREL BISULFATE 75 MG/1
75 TABLET ORAL DAILY
Qty: 30 TABLET | Refills: 3 | Status: SHIPPED | OUTPATIENT
Start: 2018-10-25

## 2018-10-24 RX ORDER — FUROSEMIDE 80 MG
80 TABLET ORAL 2 TIMES DAILY
Qty: 60 TABLET | Refills: 3 | Status: SHIPPED | OUTPATIENT
Start: 2018-10-24

## 2018-10-24 RX ORDER — TAMSULOSIN HYDROCHLORIDE 0.4 MG/1
0.4 CAPSULE ORAL DAILY
Qty: 30 CAPSULE | Refills: 3 | Status: SHIPPED | OUTPATIENT
Start: 2018-10-25

## 2018-10-24 RX ORDER — WARFARIN SODIUM 3 MG/1
6 TABLET ORAL DAILY
Status: DISCONTINUED | OUTPATIENT
Start: 2018-10-24 | End: 2018-10-24 | Stop reason: HOSPADM

## 2018-10-24 RX ORDER — NITROGLYCERIN 0.4 MG/1
TABLET SUBLINGUAL
Qty: 25 TABLET | Refills: 3 | Status: SHIPPED | OUTPATIENT
Start: 2018-10-24

## 2018-10-24 RX ADMIN — INSULIN LISPRO 6 UNITS: 100 INJECTION, SOLUTION INTRAVENOUS; SUBCUTANEOUS at 17:54

## 2018-10-24 RX ADMIN — ISOSORBIDE MONONITRATE 30 MG: 30 TABLET ORAL at 08:28

## 2018-10-24 RX ADMIN — PREGABALIN 50 MG: 50 CAPSULE ORAL at 08:26

## 2018-10-24 RX ADMIN — Medication 10 ML: at 08:24

## 2018-10-24 RX ADMIN — CARVEDILOL 25 MG: 25 TABLET, FILM COATED ORAL at 17:50

## 2018-10-24 RX ADMIN — INSULIN LISPRO 3 UNITS: 100 INJECTION, SOLUTION INTRAVENOUS; SUBCUTANEOUS at 08:41

## 2018-10-24 RX ADMIN — ALPRAZOLAM 0.5 MG: 0.5 TABLET ORAL at 08:39

## 2018-10-24 RX ADMIN — HYDRALAZINE HYDROCHLORIDE 50 MG: 50 TABLET, FILM COATED ORAL at 08:22

## 2018-10-24 RX ADMIN — Medication 10 ML: at 08:25

## 2018-10-24 RX ADMIN — HYDRALAZINE HYDROCHLORIDE 50 MG: 50 TABLET, FILM COATED ORAL at 14:36

## 2018-10-24 RX ADMIN — FUROSEMIDE 80 MG: 40 TABLET ORAL at 17:51

## 2018-10-24 RX ADMIN — CLOPIDOGREL 75 MG: 75 TABLET, FILM COATED ORAL at 08:30

## 2018-10-24 RX ADMIN — WARFARIN SODIUM 6 MG: 3 TABLET ORAL at 17:55

## 2018-10-24 RX ADMIN — PREDNISONE 40 MG: 20 TABLET ORAL at 08:27

## 2018-10-24 RX ADMIN — SUCRALFATE 1 G: 1 TABLET ORAL at 08:24

## 2018-10-24 RX ADMIN — Medication 81 MG: at 08:33

## 2018-10-24 RX ADMIN — FERROUS SULFATE TAB EC 325 MG (65 MG FE EQUIVALENT) 325 MG: 325 (65 FE) TABLET DELAYED RESPONSE at 17:50

## 2018-10-24 RX ADMIN — INSULIN LISPRO 3 UNITS: 100 INJECTION, SOLUTION INTRAVENOUS; SUBCUTANEOUS at 12:19

## 2018-10-24 RX ADMIN — PREGABALIN 50 MG: 50 CAPSULE ORAL at 14:30

## 2018-10-24 RX ADMIN — AMPICILLIN SODIUM 500 MG: 500 INJECTION, POWDER, FOR SOLUTION INTRAMUSCULAR; INTRAVENOUS at 08:39

## 2018-10-24 RX ADMIN — INSULIN GLARGINE 40 UNITS: 100 INJECTION, SOLUTION SUBCUTANEOUS at 08:42

## 2018-10-24 RX ADMIN — CARVEDILOL 25 MG: 25 TABLET, FILM COATED ORAL at 08:31

## 2018-10-24 RX ADMIN — TAMSULOSIN HYDROCHLORIDE 0.4 MG: 0.4 CAPSULE ORAL at 08:23

## 2018-10-24 RX ADMIN — PANTOPRAZOLE 20 MG: 20 TABLET, DELAYED RELEASE ORAL at 08:28

## 2018-10-24 ASSESSMENT — PAIN SCALES - GENERAL: PAINLEVEL_OUTOF10: 0

## 2018-10-24 NOTE — CARE COORDINATION
Transition planning:  Dr. Maxine Santiago spoke with pt, he will go home, declines home care. Order sent to [facility] to schedule f/u. Pt told to reschedule pcp appt for earlier, office closed now.   Pt asking about home o2, PS to Dr. Maxine Santiago, will get home o2 eval.

## 2018-10-24 NOTE — DISCHARGE INSTR - COC
Continuity of Care Form    Patient Name: Reena Braxton   :  1965  MRN:  [id number]    Admit date:  10/14/2018  Discharge date:  ***    Code Status Order: Full Code   Advance Directives:   Advance Care Flowsheet Documentation     Date/Time Healthcare Directive Type of Healthcare Directive Copy in [facility] [address] Agent's Name Healthcare Agent's Phone Number    10/15/18 1501  No, patient does not have an advance directive for healthcare treatment -- -- -- -- --          Admitting Physician:  Coretta Jacobson MD  PCP: No primary care provider on file.     Discharging Nurse: [facility] Unit/Room#: 3002/3002-01  Discharging Unit Phone Number: ***    Emergency Contact:   Extended Emergency Contact Information  Primary Emergency Contact: Sridhar Pope [address] Phone: [phone]  Relation: Child  Secondary Emergency Contact: Deonte Lalo [address] Phone: [phone]  Relation: Child    Past Surgical History:  Past Surgical History:   Procedure Laterality Date    AMPUTATION      left foot, 3rd toe    AMPUTATION      right foot, 2nd toe    CARDIAC SURGERY         Immunization History:   Immunization History   Administered Date(s) Administered    Influenza Whole 2012    Influenza, Cleavon Delay, 3 Years and older, IM (Fluzone 3 yrs and older or Afluria 5 yrs and older) 10/12/2018    Pneumococcal 13-valent Conjugate ([id number]) 10/12/2018    Pneumococcal Polysaccharide ([id number]) 2012       Active Problems:  Patient Active Problem List   Diagnosis Code    Abdominal pain R10.9    Acute osteomyelitis of toe of left foot ([facility] M86.172    Acute osteomyelitis of toe of right foot ([address] M86.171    Acute renal failure superimposed on stage 3 chronic kidney disease ([address] N17.9, N18.3    ALLISON (acute kidney injury) ([facility] N17.9    Altered mental status R41.82    Anemia D64.9    Cellulitis and abscess of foot L03.119, Assessment:  Last Vital Signs: BP (!) 149/71   Pulse 76   Temp 98.2 °F (36.8 °C) (Oral)   Resp 16   Ht 6' 4\" (1.93 m)   Wt (!) 369 lb 4.3 oz (167.5 kg)   SpO2 96%   BMI 44.95 kg/m²     Last documented pain score (0-10 scale): Pain Level: 0  Last Weight:   Wt Readings from Last 1 Encounters:   10/21/18 (!) 369 lb 4.3 oz (167.5 kg)     Mental Status:  {IP PT MENTAL STATUS:77864}    IV Access:  { MAITE IV ACCESS:21963}    Nursing Mobility/ADLs:  Walking   {P DME YFYF:671403725}  Transfer  {P DME TZRD:795939158}  Bathing  {P DME ZQHM:803835922}  Dressing  {CHP DME LIIS:807665690}  Toileting  {P DME HKDD:867597552}  Feeding  {[facility] DME TNMM:476047108}  Med Admin  {[facility] DME HHTC:553886993}  Med Delivery   { MAITE MED Delivery:489826688}    Wound Care Documentation and Therapy:  Wound 10/15/18 Foot Lower; Other (Comment) (Active)   Wound Type Wound 10/24/2018  8:00 AM   Wound Diabetic 10/23/2018  4:30 PM   Dressing Status Clean;Dry; Intact 10/24/2018  8:00 AM   Dressing/Treatment Protective barrier 10/24/2018  8:00 AM   Wound Assessment Other (Comment) 10/20/2018  5:00 PM   Drainage Amount None 10/21/2018  4:00 PM   Culture Taken No 10/16/2018  1:45 PM   Number of days: 8       Incision 10/17/18 Groin Left (Active)   Wound Assessment Clean;Dry; Intact 10/24/2018  8:00 AM   Tiana-wound Assessment Clean;Dry; Intact 10/24/2018  8:00 AM   Closure Adhesive bandage 10/21/2018  4:00 PM   Drainage Amount None 10/24/2018  8:00 AM   Odor None 10/18/2018  4:00 PM   Dressing/Treatment Dry dressing 10/20/2018 12:00 PM   Dressing Status Clean;Dry; Intact 10/24/2018  8:00 AM   Number of days: 6       Incision 10/17/18 Wrist Right (Active)   Wound Assessment Clean;Dry; Intact 10/24/2018  8:00 AM   Tiana-wound Assessment Clean;Dry; Intact 10/24/2018  8:00 AM   Closure None 10/24/2018  8:00 AM   Drainage Amount None 10/24/2018  8:00 AM   Odor None 10/20/2018 12:00 PM   Dressing/Treatment Other (Comment); Adhesive bandage 10/19/2018

## 2018-10-24 NOTE — PROGRESS NOTES
daily  insulin glargine (LANTUS) 100 UNIT/ML injection vial, Inject 40 Units into the skin 2 times daily   aspirin 81 MG tablet, Take 81 mg by mouth daily  [DISCONTINUED] insulin NPH (HUMULIN N;NOVOLIN N) 100 UNIT/ML injection vial, While on 50 mg prednisone, last dose 3/14/18 80 units of NPH in the AM, 55 units of NPH with dinner 15 of Regular with meals  Regular insulin 2 units per every 50 mg/dl greater than 150 mg/dl   3/15 return to previous insulin doses: 50 units of NPH with breakfast, 35 units of NPH with dinner 10 units of Regular insulin with meals Regular in    INVESTIGATIONS     Last 3 CMP:    Recent Labs      10/22/18   0356  10/23/18   0428  10/23/18   1504  10/24/18   0512   NA  134*  141   --   141   K  5.1  5.4*  5.4*  4.9   CL  96*  100   --   101   CO2  28  28   --   31   BUN  48*  54*   --   53*   CREATININE  1.96*  1.77*   --   1.61*   CALCIUM  8.9  8.9   --   9.0       Last 3 CBC:  Recent Labs      10/22/18   0356  10/23/18   0428  10/24/18   0512   PLT  See Reflexed IPF Result  275  329       ASSESSMENT     1. ALLISON likely secondary to contrast-induced nephropathy. May have an obstructive component with hernia. Creatinine 1.49>2.10>1.82>1.92>1.96>1.77 >1.6 improving  2. Chronic kidney disease stage III secondary to diabetic nephrosclerosis with baseline creatinine 1.3-1.6  3. NSTEMI status post PCI BMS-LAD, with preserved LV function  4. DM2, poor control hba1c 11.6, uncontrolled BS also due to steroids. 5.  HTN  6. Morbid Obesity  7. +ve large scrotal swelling- ventral hernia, on flomax, f/up with gen surgery in 6 wks  8. RA  9. Hyperkalemia- potassium at 4.9, repeating potassium  10. DVT left leg-  bridged to coumadin INR 2.6  11. UTI - on ampicillin  12. Chronic edema, right greater than line, partly secondary to amlodipine Partly secondary to impaired venous return, likely  13. Evidence of pneumonia and chest x-ray    PLAN     1. Amlodipine d/c,  lasix increased to 80 mg twice a day.

## 2018-10-25 NOTE — DISCHARGE SUMMARY
thickening. Colonic diverticulosis without evidence for acute diverticulitis. Note that the lower most bowel loops right hemiscrotum are beyond field of view of this study. Pelvis: The urinary bladder appears unremarkable. The pelvic organs demonstrate no acute abnormality. Peritoneum/Retroperitoneum: Bowel containing right inguinal hernia. Fat containing left inguinal.  The abdominal aorta is normal in caliber. Mild to moderate atherosclerotic change. No fluid collections. No lymphadenopathy. No free air. Bones/Soft Tissues: Mild soft tissue edema in the flanks. No acute osseous abnormalities. Large bowel containing right inguinal hernia and fat containing left inguinal.  No evidence of bowel obstruction or strangulation. Xr Chest Standard (2 Vw)    Result Date: 10/17/2018  EXAMINATION: TWO VIEWS OF THE CHEST 10/17/2018 10:03 am COMPARISON: 10/15/2018 HISTORY: ORDERING SYSTEM PROVIDED HISTORY: cough TECHNOLOGIST PROVIDED HISTORY: cough Acute symptoms. Initial examination. FINDINGS: There are bibasilar airspace opacities, left greater than right. No pleural effusion or pneumothorax is seen. There is ill-defined nodular opacity in the right upper lung. The heart is normal in size. No acute osseous abnormality is seen. 1. New bibasilar airspace disease, left greater than right, concerning for pneumonia given the history. Aspiration is a consideration given the distribution. 2. Ill-defined right upper lobe nodular opacity. This may be related to scarring from pneumonia in 2007, although, this finding is more prominent on the current radiographs. Further evaluation with a noncontrast chest CT is recommended. Nm Lung Vent/perfusion (vq)    Result Date: 10/15/2018  EXAMINATION: NUCLEAR MEDICINE VENTILATION PERFUSION SCAN. 10/15/2018 TECHNIQUE: 37 millicuries aerosolized Tc99m pyrophosphate was administered via mask prior to planar imaging of the lungs in multiple projections.   Then, 6 +------------------------------------------+----------+--------------------+ ! Diagnosis                                 ! Date      ! Comments            ! +------------------------------------------+----------+--------------------+ ! Chronic lung disease->COPD                !          !                    ! +------------------------------------------+----------+--------------------+ ! Previous Scan                             !07/10/2007! WNL                 ! +------------------------------------------+----------+--------------------+ ! CAD                                       ! !recent stents       ! +------------------------------------------+----------+--------------------+   - The patient's risk factor(s) include: chronic lung disease,     insulin-treated diabetes mellitus, dyslipidemia, obesity and treated     arterial hypertension.   - The patient has a former tobacco history. - The patient's last creatinine was 1.4 mg/dl. Allergies   - Allergy:NSAIDS(Drug). Velocities are measured in cm/s ; Diameters are measured in cm Right Lower Extremities DVT Study Measurements Right 2D Measurements +------------------------------------+----------+---------------+----------+ ! Location                            ! Visualized! Compressibility! Thrombosis! +------------------------------------+----------+---------------+----------+ ! Common Femoral                      !Yes       ! Yes            ! None      ! +------------------------------------+----------+---------------+----------+ ! Prox Femoral                        !Yes       ! Yes            ! None      ! +------------------------------------+----------+---------------+----------+ ! Mid Femoral                         !Yes       ! Yes            ! None      ! +------------------------------------+----------+---------------+----------+ ! Dist Femoral                        !Yes       ! Yes            ! None      ! !Yes       !Yes            ! None      ! +------------------------------------+----------+---------------+----------+ ! GSV Knee                            ! Yes       ! Yes            ! None      ! +------------------------------------+----------+---------------+----------+ ! GSV Ankle                           ! Yes       ! Yes            ! None      ! +------------------------------------+----------+---------------+----------+ ! SSV                                 ! Yes       ! Yes            ! None      ! +------------------------------------+----------+---------------+----------+ Left Doppler Measurements +---------------------------+------+------+--------------------------------+ ! Location                   ! Signal!Reflux! Reflux (msec)                   ! +---------------------------+------+------+--------------------------------+ ! Common Femoral             !Phasic!      !                                ! +---------------------------+------+------+--------------------------------+ ! Prox Femoral               !Phasic!      !                                ! +---------------------------+------+------+--------------------------------+ ! Popliteal                  !Phasic!      !                                ! +---------------------------+------+------+--------------------------------+        Consultations:    Consults:     Final Specialist Recommendations/Findings:   IP CONSULT TO CARDIOLOGY  PHARMACY TO DOSE VANCOMYCIN  IP CONSULT TO CARDIOLOGY  IP CONSULT TO NEPHROLOGY  IP CONSULT TO IV TEAM  IP CONSULT TO UROLOGY  IP CONSULT TO GENERAL SURGERY  IP CONSULT TO IV TEAM  IP CONSULT TO SOCIAL WORK  IP CONSULT TO IV TEAM  IP CONSULT TO IV TEAM  IP CONSULT TO IV TEAM      The patient was seen and examined on day of discharge and this discharge summary is in conjunction with any daily progress note from day of discharge.     Discharge plan:     Disposition: Home    Physician Follow Up:     Will Huitron MD  [address] [place] [address] [address]    Schedule an appointment as soon as possible for a visit in 1 week         Requiring Further Evaluation/Follow Up POST HOSPITALIZATION/Incidental Findings: BMP in 1 week, follow up with CHF clinic     Diet: cardiac diet    Activity: As tolerated    Instructions to Patient: return if symptoms worsen, follow up with nephrology and CHF clinic     Discharge Medications:      Medication List      START taking these medications    benzonatate 100 MG capsule  Commonly known as:  TESSALON  Take 1 capsule by mouth 3 times daily as needed for Cough     clopidogrel 75 MG tablet  Commonly known as:  PLAVIX  Take 1 tablet by mouth daily     furosemide 80 MG tablet  Commonly known as:  LASIX  Take 1 tablet by mouth 2 times daily     hydrALAZINE 50 MG tablet  Commonly known as:  APRESOLINE  Take 1 tablet by mouth every 8 hours     isosorbide mononitrate 30 MG extended release tablet  Commonly known as:  IMDUR  Take 1 tablet by mouth daily     nitroGLYCERIN 0.4 MG SL tablet  Commonly known as:  NITROSTAT  up to max of 3 total doses.  If no relief after 1 dose, call 911.     tamsulosin 0.4 MG capsule  Commonly known as:  FLOMAX  Take 1 capsule by mouth daily     warfarin 6 MG tablet  Commonly known as:  COUMADIN  Take 1 tablet by mouth daily        CHANGE how you take these medications    atorvastatin 80 MG tablet  Commonly known as:  LIPITOR  Take 1 tablet by mouth nightly  What changed:  · medication strength  · how much to take  · when to take this        CONTINUE taking these medications    ammonium lactate 12 % cream  Commonly known as:  AMLACTIN     aspirin 81 MG tablet     carvedilol 25 MG tablet  Commonly known as:  COREG     docusate sodium 100 MG capsule  Commonly known as:  COLACE     ferrous sulfate 325 (65 Fe) MG tablet     insulin glargine 100 UNIT/ML injection vial  Commonly known as:  LANTUS     insulin lispro 100 UNIT/ML injection vial  Commonly known as:  HUMALOG     ondansetron

## 2018-10-27 ENCOUNTER — APPOINTMENT (OUTPATIENT)
Dept: GENERAL RADIOLOGY | Age: 53
DRG: 194 | End: 2018-10-27
Payer: COMMERCIAL

## 2018-10-27 ENCOUNTER — HOSPITAL ENCOUNTER (INPATIENT)
Age: 53
LOS: 4 days | Discharge: HOME | DRG: 194 | End: 2018-10-31
Attending: EMERGENCY MEDICINE | Admitting: INTERNAL MEDICINE
Payer: COMMERCIAL

## 2018-10-27 ENCOUNTER — APPOINTMENT (OUTPATIENT)
Dept: CT IMAGING | Age: 53
DRG: 194 | End: 2018-10-27
Payer: COMMERCIAL

## 2018-10-27 ENCOUNTER — TELEPHONE (OUTPATIENT)
Dept: OTHER | Facility: CLINIC | Age: 53
End: 2018-10-27

## 2018-10-27 DIAGNOSIS — R06.02 SHORTNESS OF BREATH: ICD-10-CM

## 2018-10-27 DIAGNOSIS — N18.30 STAGE 3 CHRONIC KIDNEY DISEASE (HCC): Primary | ICD-10-CM

## 2018-10-27 DIAGNOSIS — E66.01 SEVERE OBESITY WITH BODY MASS INDEX (BMI) OF 35.0 TO 39.9 WITH SERIOUS COMORBIDITY (HCC): ICD-10-CM

## 2018-10-27 DIAGNOSIS — I47.1 SVT (SUPRAVENTRICULAR TACHYCARDIA) (HCC): ICD-10-CM

## 2018-10-27 DIAGNOSIS — J18.9 PNEUMONIA DUE TO ORGANISM: ICD-10-CM

## 2018-10-27 PROBLEM — N17.9 AKI (ACUTE KIDNEY INJURY) (HCC): Status: RESOLVED | Noted: 2017-08-01 | Resolved: 2018-10-27

## 2018-10-27 PROBLEM — B95.2 UTI (URINARY TRACT INFECTION) DUE TO ENTEROCOCCUS: Status: RESOLVED | Noted: 2018-10-16 | Resolved: 2018-10-27

## 2018-10-27 PROBLEM — R41.82 ALTERED MENTAL STATUS: Status: RESOLVED | Noted: 2017-08-24 | Resolved: 2018-10-27

## 2018-10-27 PROBLEM — I24.9 ACUTE CORONARY SYNDROME (HCC): Status: RESOLVED | Noted: 2018-10-15 | Resolved: 2018-10-27

## 2018-10-27 PROBLEM — N39.0 UTI (URINARY TRACT INFECTION) DUE TO ENTEROCOCCUS: Status: RESOLVED | Noted: 2018-10-16 | Resolved: 2018-10-27

## 2018-10-27 PROBLEM — E11.10 DKA (DIABETIC KETOACIDOSES): Status: RESOLVED | Noted: 2017-07-31 | Resolved: 2018-10-27

## 2018-10-27 PROBLEM — M00.9 SEPTIC ARTHRITIS OF WRIST, LEFT (HCC): Status: RESOLVED | Noted: 2017-08-02 | Resolved: 2018-10-27

## 2018-10-27 LAB
ABSOLUTE EOS #: 0.57 K/UL (ref 0–0.44)
ABSOLUTE IMMATURE GRANULOCYTE: 0.07 K/UL (ref 0–0.3)
ABSOLUTE LYMPH #: 2.06 K/UL (ref 1.1–3.7)
ABSOLUTE MONO #: 0.86 K/UL (ref 0.1–1.2)
ALLEN TEST: ABNORMAL
ANION GAP SERPL CALCULATED.3IONS-SCNC: 14 MMOL/L (ref 9–17)
ANION GAP: 6 MMOL/L (ref 7–16)
BASOPHILS # BLD: 0 % (ref 0–2)
BASOPHILS ABSOLUTE: <0.03 K/UL (ref 0–0.2)
BNP INTERPRETATION: ABNORMAL
BUN BLDV-MCNC: 25 MG/DL (ref 6–20)
BUN/CREAT BLD: ABNORMAL (ref 9–20)
CALCIUM SERPL-MCNC: 8.8 MG/DL (ref 8.6–10.4)
CHLORIDE BLD-SCNC: 104 MMOL/L (ref 98–107)
CO2: 31 MMOL/L (ref 20–31)
CREAT SERPL-MCNC: 1.33 MG/DL (ref 0.7–1.2)
DIFFERENTIAL TYPE: ABNORMAL
EOSINOPHILS RELATIVE PERCENT: 5 % (ref 1–4)
FIO2: ABNORMAL
GFR AFRICAN AMERICAN: >60 ML/MIN
GFR NON-AFRICAN AMERICAN: 56 ML/MIN
GFR NON-AFRICAN AMERICAN: 57 ML/MIN
GFR SERPL CREATININE-BSD FRML MDRD: >60 ML/MIN
GFR SERPL CREATININE-BSD FRML MDRD: ABNORMAL ML/MIN/{1.73_M2}
GLUCOSE BLD-MCNC: 187 MG/DL (ref 74–100)
GLUCOSE BLD-MCNC: 195 MG/DL (ref 70–99)
GLUCOSE BLD-MCNC: 317 MG/DL (ref 75–110)
HCO3 VENOUS: 34.7 MMOL/L (ref 22–29)
HCT VFR BLD CALC: 41.8 % (ref 40.7–50.3)
HEMOGLOBIN: 12.7 G/DL (ref 13–17)
IMMATURE GRANULOCYTES: 1 %
INR BLD: 1.9
LYMPHOCYTES # BLD: 19 % (ref 24–43)
MAGNESIUM: 1.4 MG/DL (ref 1.6–2.6)
MCH RBC QN AUTO: 24.5 PG (ref 25.2–33.5)
MCHC RBC AUTO-ENTMCNC: 30.4 G/DL (ref 28.4–34.8)
MCV RBC AUTO: 80.5 FL (ref 82.6–102.9)
MODE: ABNORMAL
MONOCYTES # BLD: 8 % (ref 3–12)
NEGATIVE BASE EXCESS, VEN: ABNORMAL (ref 0–2)
NRBC AUTOMATED: 0 PER 100 WBC
O2 DEVICE/FLOW/%: ABNORMAL
O2 SAT, VEN: 55 % (ref 60–85)
PARTIAL THROMBOPLASTIN TIME: 29.3 SEC (ref 20.5–30.5)
PATIENT TEMP: ABNORMAL
PCO2, VEN: 44.5 MM HG (ref 41–51)
PDW BLD-RTO: 17.7 % (ref 11.8–14.4)
PH VENOUS: 7.5 (ref 7.32–7.43)
PHOSPHORUS: 2.9 MG/DL (ref 2.5–4.5)
PLATELET # BLD: 375 K/UL (ref 138–453)
PLATELET ESTIMATE: ABNORMAL
PMV BLD AUTO: 10.1 FL (ref 8.1–13.5)
PO2, VEN: 26.7 MM HG (ref 30–50)
POC CHLORIDE: 106 MMOL/L (ref 98–107)
POC CREATININE: 1.31 MG/DL (ref 0.51–1.19)
POC HEMATOCRIT: 44 % (ref 41–53)
POC HEMOGLOBIN: 14.9 G/DL (ref 13.5–17.5)
POC IONIZED CALCIUM: 1.08 MMOL/L (ref 1.15–1.33)
POC LACTIC ACID: 1.83 MMOL/L (ref 0.56–1.39)
POC PCO2 TEMP: ABNORMAL MM HG
POC PH TEMP: ABNORMAL
POC PO2 TEMP: ABNORMAL MM HG
POC POTASSIUM: 4.1 MMOL/L (ref 3.5–4.5)
POC SODIUM: 147 MMOL/L (ref 138–146)
POC TROPONIN I: 0.09 NG/ML (ref 0–0.1)
POC TROPONIN INTERP: NORMAL
POSITIVE BASE EXCESS, VEN: 10 (ref 0–3)
POTASSIUM SERPL-SCNC: 4.4 MMOL/L (ref 3.7–5.3)
PRO-BNP: 7820 PG/ML
PROCALCITONIN: 0.05 NG/ML
PROTHROMBIN TIME: 19.4 SEC (ref 9–12)
RBC # BLD: 5.19 M/UL (ref 4.21–5.77)
RBC # BLD: ABNORMAL 10*6/UL
SAMPLE SITE: ABNORMAL
SEG NEUTROPHILS: 66 % (ref 36–65)
SEGMENTED NEUTROPHILS ABSOLUTE COUNT: 7.06 K/UL (ref 1.5–8.1)
SODIUM BLD-SCNC: 149 MMOL/L (ref 135–144)
TOTAL CO2, VENOUS: 36 MMOL/L (ref 23–30)
TROPONIN INTERP: ABNORMAL
TROPONIN T: 0.73 NG/ML
TROPONIN T: 0.75 NG/ML
TROPONIN T: 0.85 NG/ML
WBC # BLD: 10.6 K/UL (ref 3.5–11.3)
WBC # BLD: ABNORMAL 10*3/UL

## 2018-10-27 PROCEDURE — 99223 1ST HOSP IP/OBS HIGH 75: CPT | Performed by: INTERNAL MEDICINE

## 2018-10-27 PROCEDURE — 6360000002 HC RX W HCPCS: Performed by: INTERNAL MEDICINE

## 2018-10-27 PROCEDURE — 71260 CT THORAX DX C+: CPT

## 2018-10-27 PROCEDURE — 99285 EMERGENCY DEPT VISIT HI MDM: CPT

## 2018-10-27 PROCEDURE — 6360000004 HC RX CONTRAST MEDICATION: Performed by: EMERGENCY MEDICINE

## 2018-10-27 PROCEDURE — 94762 N-INVAS EAR/PLS OXIMTRY CONT: CPT

## 2018-10-27 PROCEDURE — 84100 ASSAY OF PHOSPHORUS: CPT

## 2018-10-27 PROCEDURE — 2580000003 HC RX 258: Performed by: INTERNAL MEDICINE

## 2018-10-27 PROCEDURE — 6370000000 HC RX 637 (ALT 250 FOR IP): Performed by: INTERNAL MEDICINE

## 2018-10-27 PROCEDURE — 84295 ASSAY OF SERUM SODIUM: CPT

## 2018-10-27 PROCEDURE — 6370000000 HC RX 637 (ALT 250 FOR IP): Performed by: EMERGENCY MEDICINE

## 2018-10-27 PROCEDURE — 82435 ASSAY OF BLOOD CHLORIDE: CPT

## 2018-10-27 PROCEDURE — 96365 THER/PROPH/DIAG IV INF INIT: CPT

## 2018-10-27 PROCEDURE — 84132 ASSAY OF SERUM POTASSIUM: CPT

## 2018-10-27 PROCEDURE — 6360000002 HC RX W HCPCS: Performed by: EMERGENCY MEDICINE

## 2018-10-27 PROCEDURE — 6370000000 HC RX 637 (ALT 250 FOR IP): Performed by: NURSE PRACTITIONER

## 2018-10-27 PROCEDURE — 2060000000 HC ICU INTERMEDIATE R&B

## 2018-10-27 PROCEDURE — 84145 PROCALCITONIN (PCT): CPT

## 2018-10-27 PROCEDURE — 36415 COLL VENOUS BLD VENIPUNCTURE: CPT

## 2018-10-27 PROCEDURE — 82803 BLOOD GASES ANY COMBINATION: CPT

## 2018-10-27 PROCEDURE — 83735 ASSAY OF MAGNESIUM: CPT

## 2018-10-27 PROCEDURE — 2580000003 HC RX 258: Performed by: EMERGENCY MEDICINE

## 2018-10-27 PROCEDURE — 82565 ASSAY OF CREATININE: CPT

## 2018-10-27 PROCEDURE — 2700000000 HC OXYGEN THERAPY PER DAY

## 2018-10-27 PROCEDURE — 85014 HEMATOCRIT: CPT

## 2018-10-27 PROCEDURE — 71045 X-RAY EXAM CHEST 1 VIEW: CPT

## 2018-10-27 PROCEDURE — 96375 TX/PRO/DX INJ NEW DRUG ADDON: CPT

## 2018-10-27 PROCEDURE — 83605 ASSAY OF LACTIC ACID: CPT

## 2018-10-27 PROCEDURE — 85730 THROMBOPLASTIN TIME PARTIAL: CPT

## 2018-10-27 PROCEDURE — 85025 COMPLETE CBC W/AUTO DIFF WBC: CPT

## 2018-10-27 PROCEDURE — 93005 ELECTROCARDIOGRAM TRACING: CPT

## 2018-10-27 PROCEDURE — 85610 PROTHROMBIN TIME: CPT

## 2018-10-27 PROCEDURE — 84484 ASSAY OF TROPONIN QUANT: CPT

## 2018-10-27 PROCEDURE — 83880 ASSAY OF NATRIURETIC PEPTIDE: CPT

## 2018-10-27 PROCEDURE — 80048 BASIC METABOLIC PNL TOTAL CA: CPT

## 2018-10-27 PROCEDURE — 82330 ASSAY OF CALCIUM: CPT

## 2018-10-27 PROCEDURE — 82947 ASSAY GLUCOSE BLOOD QUANT: CPT

## 2018-10-27 RX ORDER — BISACODYL 10 MG
10 SUPPOSITORY, RECTAL RECTAL DAILY PRN
Status: DISCONTINUED | OUTPATIENT
Start: 2018-10-27 | End: 2018-10-31 | Stop reason: HOSPADM

## 2018-10-27 RX ORDER — PANTOPRAZOLE SODIUM 20 MG/1
20 TABLET, DELAYED RELEASE ORAL DAILY
Status: DISCONTINUED | OUTPATIENT
Start: 2018-10-27 | End: 2018-10-31 | Stop reason: HOSPADM

## 2018-10-27 RX ORDER — SODIUM CHLORIDE 0.9 % (FLUSH) 0.9 %
10 SYRINGE (ML) INJECTION PRN
Status: DISCONTINUED | OUTPATIENT
Start: 2018-10-27 | End: 2018-10-31 | Stop reason: HOSPADM

## 2018-10-27 RX ORDER — MAGNESIUM SULFATE 1 G/100ML
1 INJECTION INTRAVENOUS
Status: COMPLETED | OUTPATIENT
Start: 2018-10-27 | End: 2018-10-27

## 2018-10-27 RX ORDER — MORPHINE SULFATE 4 MG/ML
4 INJECTION, SOLUTION INTRAMUSCULAR; INTRAVENOUS ONCE
Status: COMPLETED | OUTPATIENT
Start: 2018-10-27 | End: 2018-10-27

## 2018-10-27 RX ORDER — ASPIRIN 81 MG/1
81 TABLET ORAL DAILY
Status: DISCONTINUED | OUTPATIENT
Start: 2018-10-27 | End: 2018-10-31 | Stop reason: HOSPADM

## 2018-10-27 RX ORDER — POTASSIUM CHLORIDE 20MEQ/15ML
40 LIQUID (ML) ORAL PRN
Status: DISCONTINUED | OUTPATIENT
Start: 2018-10-27 | End: 2018-10-31 | Stop reason: HOSPADM

## 2018-10-27 RX ORDER — ACETAMINOPHEN 325 MG/1
650 TABLET ORAL EVERY 4 HOURS PRN
Status: DISCONTINUED | OUTPATIENT
Start: 2018-10-27 | End: 2018-10-31 | Stop reason: HOSPADM

## 2018-10-27 RX ORDER — CLOPIDOGREL BISULFATE 75 MG/1
75 TABLET ORAL DAILY
Status: DISCONTINUED | OUTPATIENT
Start: 2018-10-27 | End: 2018-10-31 | Stop reason: HOSPADM

## 2018-10-27 RX ORDER — ONDANSETRON 2 MG/ML
4 INJECTION INTRAMUSCULAR; INTRAVENOUS EVERY 6 HOURS PRN
Status: DISCONTINUED | OUTPATIENT
Start: 2018-10-27 | End: 2018-10-31 | Stop reason: HOSPADM

## 2018-10-27 RX ORDER — 0.9 % SODIUM CHLORIDE 0.9 %
500 INTRAVENOUS SOLUTION INTRAVENOUS ONCE
Status: COMPLETED | OUTPATIENT
Start: 2018-10-27 | End: 2018-10-27

## 2018-10-27 RX ORDER — ASPIRIN 81 MG/1
324 TABLET, CHEWABLE ORAL ONCE
Status: COMPLETED | OUTPATIENT
Start: 2018-10-27 | End: 2018-10-27

## 2018-10-27 RX ORDER — POTASSIUM CHLORIDE 20 MEQ/1
40 TABLET, EXTENDED RELEASE ORAL PRN
Status: DISCONTINUED | OUTPATIENT
Start: 2018-10-27 | End: 2018-10-31 | Stop reason: HOSPADM

## 2018-10-27 RX ORDER — DEXTROSE MONOHYDRATE 50 MG/ML
100 INJECTION, SOLUTION INTRAVENOUS PRN
Status: DISCONTINUED | OUTPATIENT
Start: 2018-10-27 | End: 2018-10-31 | Stop reason: HOSPADM

## 2018-10-27 RX ORDER — CARVEDILOL 25 MG/1
25 TABLET ORAL 2 TIMES DAILY WITH MEALS
Status: DISCONTINUED | OUTPATIENT
Start: 2018-10-27 | End: 2018-10-31 | Stop reason: HOSPADM

## 2018-10-27 RX ORDER — DEXTROSE MONOHYDRATE 25 G/50ML
12.5 INJECTION, SOLUTION INTRAVENOUS PRN
Status: DISCONTINUED | OUTPATIENT
Start: 2018-10-27 | End: 2018-10-31 | Stop reason: HOSPADM

## 2018-10-27 RX ORDER — NICOTINE POLACRILEX 4 MG
15 LOZENGE BUCCAL PRN
Status: DISCONTINUED | OUTPATIENT
Start: 2018-10-27 | End: 2018-10-31 | Stop reason: HOSPADM

## 2018-10-27 RX ORDER — LEVOFLOXACIN 5 MG/ML
750 INJECTION, SOLUTION INTRAVENOUS EVERY 24 HOURS
Status: DISCONTINUED | OUTPATIENT
Start: 2018-10-27 | End: 2018-10-30

## 2018-10-27 RX ORDER — SODIUM CHLORIDE 0.9 % (FLUSH) 0.9 %
10 SYRINGE (ML) INJECTION EVERY 12 HOURS SCHEDULED
Status: DISCONTINUED | OUTPATIENT
Start: 2018-10-27 | End: 2018-10-31 | Stop reason: HOSPADM

## 2018-10-27 RX ORDER — CALCIUM GLUCONATE 94 MG/ML
1 INJECTION, SOLUTION INTRAVENOUS ONCE
Status: DISCONTINUED | OUTPATIENT
Start: 2018-10-27 | End: 2018-10-27

## 2018-10-27 RX ORDER — INSULIN GLARGINE 100 [IU]/ML
40 INJECTION, SOLUTION SUBCUTANEOUS 2 TIMES DAILY
Status: DISCONTINUED | OUTPATIENT
Start: 2018-10-27 | End: 2018-10-30

## 2018-10-27 RX ORDER — HEPARIN SODIUM 5000 [USP'U]/ML
5000 INJECTION, SOLUTION INTRAVENOUS; SUBCUTANEOUS EVERY 8 HOURS SCHEDULED
Status: DISCONTINUED | OUTPATIENT
Start: 2018-10-27 | End: 2018-10-28

## 2018-10-27 RX ORDER — POTASSIUM CHLORIDE 7.45 MG/ML
10 INJECTION INTRAVENOUS PRN
Status: DISCONTINUED | OUTPATIENT
Start: 2018-10-27 | End: 2018-10-31 | Stop reason: HOSPADM

## 2018-10-27 RX ORDER — WARFARIN SODIUM 5 MG/1
5 TABLET ORAL DAILY
Status: DISCONTINUED | OUTPATIENT
Start: 2018-10-27 | End: 2018-10-28

## 2018-10-27 RX ORDER — ATORVASTATIN CALCIUM 80 MG/1
80 TABLET, FILM COATED ORAL NIGHTLY
Status: DISCONTINUED | OUTPATIENT
Start: 2018-10-27 | End: 2018-10-31 | Stop reason: HOSPADM

## 2018-10-27 RX ORDER — MAGNESIUM SULFATE 1 G/100ML
1 INJECTION INTRAVENOUS PRN
Status: DISCONTINUED | OUTPATIENT
Start: 2018-10-27 | End: 2018-10-31 | Stop reason: HOSPADM

## 2018-10-27 RX ADMIN — BENZOCAINE, MENTHOL 1 LOZENGE: 15; 3.6 LOZENGE ORAL at 21:58

## 2018-10-27 RX ADMIN — MORPHINE SULFATE 4 MG: 4 INJECTION INTRAVENOUS at 12:19

## 2018-10-27 RX ADMIN — INSULIN GLARGINE 40 UNITS: 100 INJECTION, SOLUTION SUBCUTANEOUS at 20:34

## 2018-10-27 RX ADMIN — Medication 1500 MG: at 23:37

## 2018-10-27 RX ADMIN — INSULIN LISPRO 8 UNITS: 100 INJECTION, SOLUTION INTRAVENOUS; SUBCUTANEOUS at 20:33

## 2018-10-27 RX ADMIN — Medication 10 ML: at 20:25

## 2018-10-27 RX ADMIN — SODIUM CHLORIDE 500 ML: 9 INJECTION, SOLUTION INTRAVENOUS at 12:19

## 2018-10-27 RX ADMIN — LEVOFLOXACIN 750 MG: 5 INJECTION, SOLUTION INTRAVENOUS at 20:26

## 2018-10-27 RX ADMIN — Medication 1500 MG: at 15:43

## 2018-10-27 RX ADMIN — MAGNESIUM SULFATE HEPTAHYDRATE 1 G: 1 INJECTION, SOLUTION INTRAVENOUS at 14:10

## 2018-10-27 RX ADMIN — BENZOCAINE, MENTHOL 1 LOZENGE: 15; 3.6 LOZENGE ORAL at 23:37

## 2018-10-27 RX ADMIN — HEPARIN SODIUM 5000 UNITS: 5000 INJECTION, SOLUTION INTRAVENOUS; SUBCUTANEOUS at 20:25

## 2018-10-27 RX ADMIN — CARVEDILOL 25 MG: 25 TABLET, FILM COATED ORAL at 20:33

## 2018-10-27 RX ADMIN — ASPIRIN 324 MG: 81 TABLET, CHEWABLE ORAL at 12:19

## 2018-10-27 RX ADMIN — IOPAMIDOL 75 ML: 755 INJECTION, SOLUTION INTRAVENOUS at 14:59

## 2018-10-27 RX ADMIN — MAGNESIUM SULFATE HEPTAHYDRATE 1 G: 1 INJECTION, SOLUTION INTRAVENOUS at 13:39

## 2018-10-27 RX ADMIN — ATORVASTATIN CALCIUM 80 MG: 80 TABLET, FILM COATED ORAL at 20:33

## 2018-10-27 RX ADMIN — WARFARIN SODIUM 5 MG: 5 TABLET ORAL at 20:25

## 2018-10-27 RX ADMIN — PIPERACILLIN SODIUM,TAZOBACTAM SODIUM 4.5 G: 4; .5 INJECTION, POWDER, FOR SOLUTION INTRAVENOUS at 15:45

## 2018-10-27 ASSESSMENT — PAIN SCALES - GENERAL
PAINLEVEL_OUTOF10: 8
PAINLEVEL_OUTOF10: 8
PAINLEVEL_OUTOF10: 10
PAINLEVEL_OUTOF10: 0
PAINLEVEL_OUTOF10: 0

## 2018-10-27 ASSESSMENT — ENCOUNTER SYMPTOMS
EYE PAIN: 0
ABDOMINAL PAIN: 0
NAUSEA: 0
VOMITING: 0
RHINORRHEA: 0
SORE THROAT: 0
COUGH: 0
COLOR CHANGE: 0
SHORTNESS OF BREATH: 1

## 2018-10-27 ASSESSMENT — PAIN DESCRIPTION - PAIN TYPE: TYPE: ACUTE PAIN

## 2018-10-27 ASSESSMENT — PAIN DESCRIPTION - LOCATION: LOCATION: ABDOMEN

## 2018-10-27 ASSESSMENT — PAIN DESCRIPTION - PROGRESSION: CLINICAL_PROGRESSION: NOT CHANGED

## 2018-10-27 NOTE — PROGRESS NOTES
Elevated troponin    Systolic dysfunction, left ventricle    UTI (urinary tract infection) due to Enterococcus    Left leg DVT (HCC)    Ischemic cardiomyopathy    Anxiety       Allergies:  Nsaids     Temp max: afebrile    Recent Labs      10/27/18   1152   BUN  25*       Recent Labs      10/27/18   1149  10/27/18   1152   CREATININE  1.31*  1.33*       Recent Labs      10/27/18   1152   WBC  10.6         Intake/Output Summary (Last 24 hours) at 10/27/18 1540  Last data filed at 10/27/18 1339   Gross per 24 hour   Intake 500 ml   Output 0 ml   Net 500 ml       Culture Date      Source                       Results      Ht Readings from Last 1 Encounters:   10/27/18 6' 4\" (1.93 m)        Wt Readings from Last 1 Encounters:   10/27/18 (!) 350 lb (158.8 kg)         Body mass index is 42.6 kg/m². Estimated Creatinine Clearance: 104 mL/min (A) (based on SCr of 1.33 mg/dL (H)). Goal Trough Level: 10-20 mcg/mL    Assessment/Plan:  Will initiate vancomycin 1500 mg IV every 8 hours. Timing of trough level will be determined based on culture results, renal function, and clinical response. Vancomycin trough ordered for Joni 10/18/18 at 1500. Thank you for the consult. Will continue to follow.   Butch Monday [facility]  10/27/2018  3:41 PM

## 2018-10-27 NOTE — ED NOTES
Pt resting on cart with call light within reach. NAD noted, RR even and NL. Per Dr. Matt Johnson, hold calcium gluconate, will continue to monitor.      Damion Dave RN  10/27/18 5581

## 2018-10-27 NOTE — H&P
reports that he quit smoking about 9 months ago. He has never used smokeless tobacco.  Alcohol:      reports that he does not drink alcohol. Drug Use:  reports that he does not use drugs. Family History:     Family History   Problem Relation Age of Onset    No Known Problems Mother     No Known Problems Father     No Known Problems Sister        Review of Systems:     Positive and Negative as described in HPI.     CONSTITUTIONAL:  negative for fevers, chills, sweats, fatigue, weight loss  HEENT:  negative for vision, hearing changes, runny nose, throat pain  RESPIRATORY:  Positive for shortness of breath, cough, yellow sputum production   CARDIOVASCULAR:  negative for chest pain, positive for palpitations   GASTROINTESTINAL:  negative for nausea, vomiting, diarrhea, constipation, abdominal pain   GENITOURINARY:  negative for difficulty of urination, burning with urination, frequency   INTEGUMENT:  negative for rash, skin lesions, easy bruising  ALLERGIC/IMMUNOLOGIC:  negative for urticaria , itching  ENDOCRINE:  negative increase in drinking, increase in urination, hot or cold intolerance  MUSCULOSKELETAL:  negative joint pains, muscle aches, swelling of joints  NEUROLOGICAL:  negative for headaches, dizziness, lightheadedness, numbness, pain, tingling extremities  BEHAVIOR/PSYCH:  negative for depression, anxiety    Physical Exam:   /83   Pulse 74   Temp 97.8 °F (36.6 °C)   Resp 16   Ht 6' 4\" (1.93 m)   Wt (!) 350 lb (158.8 kg)   SpO2 97%   BMI 42.60 kg/m²   Temp (24hrs), Av.8 °F (36.6 °C), Min:97.8 °F (36.6 °C), Max:97.8 °F (36.6 °C)    Recent Labs      10/27/18   1149   POCGLU  187*       Intake/Output Summary (Last 24 hours) at 10/27/18 1736  Last data filed at 10/27/18 1708   Gross per 24 hour   Intake              600 ml   Output                0 ml   Net              600 ml       General Appearance:  alert, well appearing, and in no acute distress, on nasal canula   Mental status: oriented to person, place, and time with normal affect  Head:  normocephalic, atraumatic. Eye: no icterus, redness, pupils equal and reactive, extraocular eye movements intact, conjunctiva clear  Ear: normal external ear, no discharge, hearing intact  Nose:  no drainage noted  Mouth: mucous membranes moist  Neck: supple, no carotid bruits, thyroid not palpable  Lungs: Bilateral equal air entry, clear to ausculation, no wheezing  Cardiovascular: normal rate, regular rhythm  Abdomen: Soft, nontender, nondistended, normal bowel sounds  Neurologic: There are no new focal motor or sensory deficits, normal muscle tone and bulk, no abnormal sensation, normal speech, cranial nerves II through XII grossly intact  Skin: No gross lesions, rashes, bruising or bleeding on exposed skin area  Extremities:  peripheral pulses palpable  Psych: normal affect    Investigations:      Laboratory Testing:  Recent Results (from the past 24 hour(s))   POCT troponin    Collection Time: 10/27/18 11:44 AM   Result Value Ref Range    POC Troponin I 0.09 0.00 - 0.10 ng/mL    POC Troponin Interp       The Troponin-I (POC) results cannot be compared to the Troponin-T results.    EKG 12 Lead    Collection Time: 10/27/18 11:47 AM   Result Value Ref Range    Ventricular Rate 146 BPM    Atrial Rate 70 BPM    QRS Duration 122 ms    Q-T Interval 316 ms    QTc Calculation (Bazett) 492 ms    R Axis -45 degrees    T Axis 21 degrees   Venous Blood Gas, POC    Collection Time: 10/27/18 11:49 AM   Result Value Ref Range    pH, Matt 7.499 (H) 7.320 - 7.430    pCO2, Matt 44.5 41.0 - 51.0 mm Hg    pO2, Matt 26.7 (L) 30.0 - 50.0 mm Hg    HCO3, Venous 34.7 (H) 22.0 - 29.0 mmol/L    Total CO2, Venous 36 (H) 23.0 - 30.0 mmol/L    Negative Base Excess, Matt NOT REPORTED 0.0 - 2.0    Positive Base Excess, Matt 10 (H) 0.0 - 3.0    O2 Sat, Matt 55 (L) 60.0 - 85.0 %    O2 Device/Flow/% NOT REPORTED     Eliceo Test NOT REPORTED     Sample Site NOT REPORTED     Mode NOT REPORTED FIO2 NOT REPORTED     Pt Temp NOT REPORTED     POC pH Temp NOT REPORTED     POC pCO2 Temp NOT REPORTED mm Hg    POC pO2 Temp NOT REPORTED mm Hg   Hemoglobin and hematocrit, blood    Collection Time: 10/27/18 11:49 AM   Result Value Ref Range    POC Hemoglobin 14.9 13.5 - 17.5 g/dL    POC Hematocrit 44 41 - 53 %   Creatinine W/GFR Point of Care    Collection Time: 10/27/18 11:49 AM   Result Value Ref Range    POC Creatinine 1.31 (H) 0.51 - 1.19 mg/dL    GFR Comment >60 >60 mL/min    GFR Non- 57 (L) >60 mL/min    GFR Comment         SODIUM (POC)    Collection Time: 10/27/18 11:49 AM   Result Value Ref Range    POC Sodium 147 (H) 138 - 146 mmol/L   POTASSIUM (POC)    Collection Time: 10/27/18 11:49 AM   Result Value Ref Range    POC Potassium 4.1 3.5 - 4.5 mmol/L   CHLORIDE (POC)    Collection Time: 10/27/18 11:49 AM   Result Value Ref Range    POC Chloride 106 98 - 107 mmol/L   CALCIUM, IONIC (POC)    Collection Time: 10/27/18 11:49 AM   Result Value Ref Range    POC Ionized Calcium 1.08 (L) 1.15 - 1.33 mmol/L   Lactic Acid, POC    Collection Time: 10/27/18 11:49 AM   Result Value Ref Range    POC Lactic Acid 1.83 (H) 0.56 - 1.39 mmol/L   POCT Glucose    Collection Time: 10/27/18 11:49 AM   Result Value Ref Range    POC Glucose 187 (H) 74 - 100 mg/dL   Anion Gap (Calc) POC    Collection Time: 10/27/18 11:49 AM   Result Value Ref Range    Anion Gap 6 (L) 7 - 16 mmol/L   CBC Auto Differential    Collection Time: 10/27/18 11:52 AM   Result Value Ref Range    WBC 10.6 3.5 - 11.3 k/uL    RBC 5.19 4.21 - 5.77 m/uL    Hemoglobin 12.7 (L) 13.0 - 17.0 g/dL    Hematocrit 41.8 40.7 - 50.3 %    MCV 80.5 (L) 82.6 - 102.9 fL    MCH 24.5 (L) 25.2 - 33.5 pg    MCHC 30.4 28.4 - 34.8 g/dL    RDW 17.7 (H) 11.8 - 14.4 %    Platelets 385 621 - 858 k/uL    MPV 10.1 8.1 - 13.5 fL    NRBC Automated 0.0 0.0 per 100 WBC    Differential Type NOT REPORTED     WBC Morphology NOT REPORTED     RBC Morphology ANISOCYTOSIS PRESENT QTc Calculation (Bazett) 460 ms    P Axis 51 degrees    R Axis -77 degrees    T Axis 43 degrees   Troponin    Collection Time: 10/27/18  2:28 PM   Result Value Ref Range    Troponin T 0.75 () <0.03 ng/mL    Troponin Interp                 Assessment :      Primary Problem  SVT (supraventricular tachycardia) [facility])    Active Hospital Problems    Diagnosis Date Noted    Stage 3 chronic kidney disease ([facility] [N18.3] 10/27/2018    Pneumonia of right upper lobe due to infectious organism ([facility] [J18.1] 10/27/2018    Left leg DVT ([facility] [I82.402] 10/17/2018    [facility] [I47.1] 10/15/2018    Elevated troponin [R74.8] 10/15/2018    Uncontrolled type 2 diabetes mellitus with diabetic nephropathy, with long-term current use of insulin (HCC) [E11.21, E11.65, Z79.4] 03/09/2018    HTN (hypertension) [I10] 12/27/2012    Diabetic foot ulcer associated with type 2 diabetes mellitus ([facility] [R21.174, L97.509] 07/20/2012       Plan:     Patient status Admit as inpatient in the  Progressive Unit/Step down    - Cardiology evaluation for recurrent SVT  - IV abx  - Check procalcitonin  - sputum culture  - Monitor creatinine post CTA  - Hold diuretics pending creatinine  - Monitor intake/output  - Monitor WBC/fevers  - Supplemental O2  - Resume coumadin for DVT  - Resume home medications - DAPT, statin, insulin   - Insulin correction scale  - PT/OT  - SW for dc planning      Consultations:   IP CONSULT TO CARDIOLOGY  PHARMACY TO DOSE VANCOMYCIN  IP CONSULT TO HOSPITALIST  IP CONSULT TO SOCIAL WORK     Patient is admitted as inpatient status because of co-morbidities listed above, severity of signs and symptoms as outlined, requirement for current medical therapies and most importantly because of direct risk to patient if care not provided in a hospital setting.     Ashtyn Cameron MD  10/27/2018  5:36 PM

## 2018-10-27 NOTE — PROGRESS NOTES
use of insulin ([facility] 3/9/2018    Uncontrolled type 2 diabetes mellitus with retinopathy, with long-term current use of insulin ([facility] [facility] 3/9/2018                Recent Labs      10/27/18   1152   INR  1.9     Recent Labs      10/27/18   1152   HGB  12.7*   HCT  41.8   PLT  375       Current warfarin drug-drug interactions: Levaquin (reduce dose by 20%)      Date             INR        Dose   10/27/2018            1.9       5 mg    Daily PT/INR while inpatient. Thank you for the consult. Will continue to follow.     Cesilia Crowe D.  10/27/2018  6:36 PM

## 2018-10-27 NOTE — ED PROVIDER NOTES
lispro (HUMALOG) 100 UNIT/ML injection vial Inject 10 Units into the skin 3 times daily (before meals) 10 units before meals in addition to sliding scale dose    Historical Provider, MD   docusate sodium (COLACE) 100 MG capsule Take 100 mg by mouth 2 times daily    Historical Provider, MD   ferrous sulfate 325 (65 Fe) MG tablet Take 325 mg by mouth 2 times daily (with meals)    Historical Provider, MD   ammonium lactate (AMLACTIN) 12 % cream Apply topically    Historical Provider, MD   pregabalin (LYRICA) 50 MG capsule Take 50 mg by mouth 2 times daily. Coby Snow Historical Provider, MD   sucralfate (CARAFATE) 1 GM tablet Take 1 tablet by mouth 4 times daily 10/7/18   OLIVER Cooper CNP   ondansetron (ZOFRAN ODT) 4 MG disintegrating tablet Take 1 tablet by mouth every 8 hours as needed for Nausea 10/7/18   OLIVER Cooper CNP   carvedilol (COREG) 25 MG tablet Take 25 mg by mouth 2 times daily (with meals)    Historical Provider, MD   pantoprazole (PROTONIX) 20 MG tablet Take 20 mg by mouth daily    Historical Provider, MD   insulin glargine (LANTUS) 100 UNIT/ML injection vial Inject 40 Units into the skin 2 times daily     Historical Provider, MD   aspirin 81 MG tablet Take 81 mg by mouth daily    Historical Provider, MD       REVIEW OF SYSTEMS    (2-9 systems for level 4, 10 or more for level 5)      Review of Systems   Constitutional: Negative for chills and fever. HENT: Negative for rhinorrhea and sore throat. Eyes: Negative for pain and visual disturbance. Respiratory: Positive for shortness of breath. Negative for cough. Cardiovascular: Positive for chest pain. Negative for palpitations. Gastrointestinal: Negative for abdominal pain, nausea and vomiting. Genitourinary: Negative for difficulty urinating and dysuria. Musculoskeletal: Negative for arthralgias and myalgias. Skin: Negative for color change and wound. Neurological: Negative for weakness, numbness and headaches. Gap (Calc) POC    EKG 12 Lead    EKG 12 Lead    EKG 12 Lead    Insert peripheral IV    PATIENT STATUS (FROM ED OR OR/PROCEDURAL) Inpatient       MEDICATIONS ORDERED:  Orders Placed This Encounter   Medications    DISCONTD: calcium gluconate 10 % injection 1 g    0.9 % sodium chloride bolus    calcium gluconate 1 g in dextrose 5% 100 mL IVPB    morphine (PF) injection 4 mg    aspirin chewable tablet 324 mg    DISCONTD: magnesium sulfate 2 g in dextrose 5 % 100 mL IVPB    magnesium sulfate 1 g in dextrose 5% 100 mL IVPB    iopamidol (ISOVUE-370) 76 % injection 75 mL    piperacillin-tazobactam (ZOSYN) 4.5 g in dextrose 5 % 100 mL IVPB (mini-bag)    vancomycin (VANCOCIN) 1500 mg in dextrose 5 % 250 mL IVPB    vancomycin (VANCOCIN) intermittent dosing (placeholder)       DIAGNOSTIC RESULTS / EMERGENCY DEPARTMENT COURSE / MDM     LABS:  Results for orders placed or performed during the hospital encounter of 10/27/18   CBC Auto Differential   Result Value Ref Range    WBC 10.6 3.5 - 11.3 k/uL    RBC 5.19 4.21 - 5.77 m/uL    Hemoglobin 12.7 (L) 13.0 - 17.0 g/dL    Hematocrit 41.8 40.7 - 50.3 %    MCV 80.5 (L) 82.6 - 102.9 fL    MCH 24.5 (L) 25.2 - 33.5 pg    MCHC 30.4 28.4 - 34.8 g/dL    RDW 17.7 (H) 11.8 - 14.4 %    Platelets 854 097 - 647 k/uL    MPV 10.1 8.1 - 13.5 fL    NRBC Automated 0.0 0.0 per 100 WBC    Differential Type NOT REPORTED     WBC Morphology NOT REPORTED     RBC Morphology ANISOCYTOSIS PRESENT     Platelet Estimate NOT REPORTED     Seg Neutrophils 66 (H) 36 - 65 %    Lymphocytes 19 (L) 24 - 43 %    Monocytes 8 3 - 12 %    Eosinophils % 5 (H) 1 - 4 %    Basophils 0 0 - 2 %    Immature Granulocytes 1 (H) 0 %    Segs Absolute 7.06 1.50 - 8.10 k/uL    Absolute Lymph # 2.06 1.10 - 3.70 k/uL    Absolute Mono # 0.86 0.10 - 1.20 k/uL    Absolute Eos # 0.57 (H) 0.00 - 0.44 k/uL    Basophils # <0.03 0.00 - 0.20 k/uL    Absolute Immature Granulocyte 0.07 0.00 - 0.30 k/uL   Basic Metabolic Panel medications, the patient's heart rate had gone from the 150s down to a sinus rhythm in the 80s and 90s. Patient states that the symptoms had improved. Patient was updated on lab work and imaging studies. The CT was negative for PE, but showed that he had pneumonia. As such, he was started on vancomycin and Zosyn. Patient was admitted to the Intermed service. PROCEDURES:  None    CONSULTS:  IP CONSULT TO CARDIOLOGY  PHARMACY TO DOSE VANCOMYCIN  IP CONSULT TO HOSPITALIST  IP CONSULT TO SOCIAL WORK    CRITICAL CARE:  None    FINAL IMPRESSION      1. Pneumonia due to organism    2. Shortness of breath          DISPOSITION / PLAN     DISPOSITION Admitted 10/27/2018 03:47:19 PM      PATIENT REFERRED TO:  No follow-up provider specified.     DISCHARGE MEDICATIONS:  New Prescriptions    No medications on file       Corrinne Battles, MD  Emergency Medicine Resident    (Please note that portions of thisnote were completed with a voice recognition program.  Efforts were made to edit the dictations but occasionally words are mis-transcribed.)       Corrinne Battles, MD  Resident  10/27/18 1673

## 2018-10-27 NOTE — PROGRESS NOTES
Cardiac Testing     CATH 10/17/18: Successful PTCA/BMS to proximal LAD from left femoral approach. Catheters unable to reach LM via right radial approach due to brachiocephalic tortuosity. ECHO 10/15/18: EF 35-40%, +WMA.

## 2018-10-27 NOTE — ED PROVIDER NOTES
[facility] ED  Emergency Department  Emergency Medicine Resident Sign-out     Care of Mario Carter was assumed from Dr. Chuck Miguel and is being seen for Shortness of Breath (Pt reports SOB for the past several days, had stents placed 10/15/2018)  . The patient's initial evaluation and plan have been discussed with the prior provider who initially evaluated the patient.      EMERGENCY DEPARTMENT COURSE / MEDICAL DECISION MAKING:       MEDICATIONS GIVEN:  Orders Placed This Encounter   Medications    DISCONTD: calcium gluconate 10 % injection 1 g    0.9 % sodium chloride bolus    calcium gluconate 1 g in dextrose 5% 100 mL IVPB    morphine (PF) injection 4 mg    aspirin chewable tablet 324 mg    DISCONTD: magnesium sulfate 2 g in dextrose 5 % 100 mL IVPB    magnesium sulfate 1 g in dextrose 5% 100 mL IVPB    iopamidol (ISOVUE-370) 76 % injection 75 mL    piperacillin-tazobactam (ZOSYN) 4.5 g in dextrose 5 % 100 mL IVPB (mini-bag)    vancomycin (VANCOCIN) 1500 mg in dextrose 5 % 250 mL IVPB    vancomycin (VANCOCIN) intermittent dosing (placeholder)       LABS / RADIOLOGY:     Labs Reviewed   CBC WITH AUTO DIFFERENTIAL - Abnormal; Notable for the following:        Result Value    Hemoglobin 12.7 (*)     MCV 80.5 (*)     MCH 24.5 (*)     RDW 17.7 (*)     Seg Neutrophils 66 (*)     Lymphocytes 19 (*)     Eosinophils % 5 (*)     Immature Granulocytes 1 (*)     Absolute Eos # 0.57 (*)     All other components within normal limits   BASIC METABOLIC PANEL - Abnormal; Notable for the following:     Glucose 195 (*)     BUN 25 (*)     CREATININE 1.33 (*)     Sodium 149 (*)     GFR Non- 56 (*)     All other components within normal limits   PROTIME-INR - Abnormal; Notable for the following:     Protime 19.4 (*)     All other components within normal limits   BRAIN NATRIURETIC PEPTIDE - Abnormal; Notable for the following:     Pro-BNP 7,820 (*)     All other components within normal and/or weight based adjustment of the mA/kV was utilized to reduce the radiation dose to as low as reasonably achievable. COMPARISON: None. HISTORY: ORDERING SYSTEM PROVIDED HISTORY: Periumbelical abd tenderness, epigastric tenderness. Hernia. FINDINGS: Lower Chest:  Visualized portion of the lower chest demonstrates no acute abnormality. Scarring and probable partial atelectasis in the right lower lobe. Organs: Evaluation of abdominal organs and bowel is limited due to lack of IV contrast.  Liver, spleen, pancreas and adrenal glands are within normal limits. Unremarkable gallbladder. No enlarged lymph nodes identified in the abdomen and pelvis. No hydronephrosis. GI/Bowel: Please see below for description of inguinal hernia contents. .  No focal enterocolitis. No bowel obstruction. Colonic diverticulosis. No evidence of appendicitis. No free air. Pelvis: Large right inguinal hernia containing cecum and proximal right colon as well as small bowel. Appendix is nondilated. Concurrent hydrocele in the right inguinal hernia sac. There is no associated bowel obstruction. There is a smaller left inguinal hernia containing fat only. Minimal wall thickening and pericolonic edema adjacent to the proximal right colon. Peritoneum/Retroperitoneum: No abdominal aortic aneurysm. Bones/Soft Tissues: No acute osseous findings identified. 1. Large right inguinal hernia containing proximal right colon, appendix and small bowel. There is associated hydrocele. No bowel obstruction. 2. Slight wall thickening and pericolonic edema in the intrapelvic portion of the proximal right colon. Correlate for possible mild colitis. 3. Small left inguinal hernia containing fat only.      Ct Abdomen Pelvis Wo Contrast    Result Date: 10/7/2018  EXAMINATION: CT OF THE ABDOMEN AND PELVIS WITHOUT CONTRAST 10/6/2018 11:43 pm TECHNIQUE: CT of the abdomen and pelvis was performed without the administration of intravenous contrast. Multiplanar reformatted images are provided for review. Dose modulation, iterative reconstruction, and/or weight based adjustment of the mA/kV was utilized to reduce the radiation dose to as low as reasonably achievable. COMPARISON: 04/12/2008 HISTORY: ORDERING SYSTEM PROVIDED HISTORY: abdominal pain FINDINGS: Lower Chest: Segmental fibrotic changes again noted right lateral lung base. Organs: The visualized portions of the liver, gallbladder, spleen, pancreas and adrenal glands appear unremarkable within the constraints of a noncontrast exam.  No biliary ductal dilatation. The kidneys appear normal in size without evidence of a contour distorting mass. No renal stone or hydronephrosis. No perinephric stranding. GI/Bowel: Broad-based right inguinal hernia contains loops of nonobstructed non strangulated large and small bowel. No evidence of obstruction. No bowel wall thickening. Colonic diverticulosis without evidence for acute diverticulitis. Note that the lower most bowel loops right hemiscrotum are beyond field of view of this study. Pelvis: The urinary bladder appears unremarkable. The pelvic organs demonstrate no acute abnormality. Peritoneum/Retroperitoneum: Bowel containing right inguinal hernia. Fat containing left inguinal.  The abdominal aorta is normal in caliber. Mild to moderate atherosclerotic change. No fluid collections. No lymphadenopathy. No free air. Bones/Soft Tissues: Mild soft tissue edema in the flanks. No acute osseous abnormalities. Large bowel containing right inguinal hernia and fat containing left inguinal.  No evidence of bowel obstruction or strangulation. Xr Chest Standard (2 Vw)    Result Date: 10/17/2018  EXAMINATION: TWO VIEWS OF THE CHEST 10/17/2018 10:03 am COMPARISON: 10/15/2018 HISTORY: ORDERING SYSTEM PROVIDED HISTORY: cough TECHNOLOGIST PROVIDED HISTORY: cough Acute symptoms. Initial examination.  FINDINGS: There are bibasilar airspace opacities, left Number   [id number]        Height:                 76 inch, 193.04 cm   Corporate ID  [id number]  Weight:                 347 pounds, 157.4 kg  #   Patient Acct  [de-identified]   BSA:        2.8 m^2     BMI:       42.24 kg/m^2  #   MR #          [id number]     Sonographer             Barbara Viera   Accession #   [id number]   Interpreting Physician  [address] [facility]   Referring                 Referring Physician     [facility]  Nurse  Practitioner  Procedure Type of Study:   Veins: Lower Extremities DVT Study, Venous Scan Lower Bilateral.  Indications for Study:Edema. Patient Status: In Patient. Technical Quality:Limited visualization. Limitation reason:Due to depth edema. - Critical Result: Rom Simpson RN at 3:50 pm.  Conclusions   Summary   Acute deep vein thrombosis of the left leg involving the gastrocnemius  vein. Signature   ----------------------------------------------------------------  Electronically signed by Barbara Viera(Sonographer) on  10/17/2018 04:08 PM  ----------------------------------------------------------------   ----------------------------------------------------------------  Electronically signed by Barbera Speed Reyes,Arthur(Interpreting  physician) on 10/17/2018 08:12 PM  ----------------------------------------------------------------  Findings:   Right Impression:                    Left Impression:  The common femoral, femoral,         The common femoral, femoral,  popliteal and tibial veins           popliteal and tibial veins  demonstrate normal compressibility   demonstrate normal compressibility  and augmentation. and augmentation. Normal compressibility of the great  Normal compressibility of the great  saphenous vein. saphenous vein. Normal compressibility of the small  Normal compressibility of the small  saphenous vein. saphenous vein.   Limited visualization of the         Limited visualization of the  posterior tibial and peroneal veins. posterior and peroneal veins. The deep calf muscle vein is non                                       compressible with hypoechoic echoes. Risk Factors History +------------------------------------------+----------+--------------------+ ! Diagnosis                                 ! Date      ! Comments            ! +------------------------------------------+----------+--------------------+ ! Chronic lung disease->COPD                !          !                    ! +------------------------------------------+----------+--------------------+ ! Previous Scan                             !07/10/2007! WNL                 ! +------------------------------------------+----------+--------------------+ ! CAD                                       ! !recent stents       ! +------------------------------------------+----------+--------------------+   - The patient's risk factor(s) include: chronic lung disease,     insulin-treated diabetes mellitus, dyslipidemia, obesity and treated     arterial hypertension.   - The patient has a former tobacco history. - The patient's last creatinine was 1.4 mg/dl. Allergies   - Allergy:NSAIDS(Drug). Velocities are measured in cm/s ; Diameters are measured in cm Right Lower Extremities DVT Study Measurements Right 2D Measurements +------------------------------------+----------+---------------+----------+ ! Location                            ! Visualized! Compressibility! Thrombosis! +------------------------------------+----------+---------------+----------+ ! Common Femoral                      !Yes       ! Yes            ! None      ! +------------------------------------+----------+---------------+----------+ ! Prox Femoral                        !Yes       ! Yes            ! None      ! +------------------------------------+----------+---------------+----------+ ! Mid Femoral                         !Yes       ! Yes            ! None      ! on bactrim and zosyn for hospital-acquired pneumonia. patient has been admitted to [facility]. OUTSTANDING TASKS / RECOMMENDATIONS:    1. Awaiting bed placement     FINAL IMPRESSION:     1. Pneumonia due to organism    2. Shortness of breath        DISPOSITION:         DISPOSITION:  []  Discharge   []  Transfer -    [x]  Admission -  Intermed    []  Against Medical Advice   []  Eloped   FOLLOW-UP: No follow-up provider specified.    DISCHARGE MEDICATIONS: New Prescriptions    No medications on file           Deneen Bell MD  Emergency Medicine Resident  [address] [facility]       Deneen Bell MD  Resident  10/27/18 0942

## 2018-10-27 NOTE — TELEPHONE ENCOUNTER
Writer contacted Dr. Jumana Stephenson, ED provider to inform of 30 day readmission risk. ED provider informed writer of readmission.

## 2018-10-28 PROBLEM — I50.23 ACUTE ON CHRONIC SYSTOLIC (CONGESTIVE) HEART FAILURE (HCC): Status: ACTIVE | Noted: 2018-10-28

## 2018-10-28 LAB
ANION GAP SERPL CALCULATED.3IONS-SCNC: 15 MMOL/L (ref 9–17)
BUN BLDV-MCNC: 24 MG/DL (ref 6–20)
BUN/CREAT BLD: ABNORMAL (ref 9–20)
CALCIUM SERPL-MCNC: 8.2 MG/DL (ref 8.6–10.4)
CHLORIDE BLD-SCNC: 102 MMOL/L (ref 98–107)
CO2: 28 MMOL/L (ref 20–31)
CREAT SERPL-MCNC: 1.29 MG/DL (ref 0.7–1.2)
GFR AFRICAN AMERICAN: >60 ML/MIN
GFR NON-AFRICAN AMERICAN: 58 ML/MIN
GFR SERPL CREATININE-BSD FRML MDRD: ABNORMAL ML/MIN/{1.73_M2}
GFR SERPL CREATININE-BSD FRML MDRD: ABNORMAL ML/MIN/{1.73_M2}
GLUCOSE BLD-MCNC: 190 MG/DL (ref 75–110)
GLUCOSE BLD-MCNC: 273 MG/DL (ref 75–110)
GLUCOSE BLD-MCNC: 324 MG/DL (ref 75–110)
GLUCOSE BLD-MCNC: 340 MG/DL (ref 70–99)
GLUCOSE BLD-MCNC: 385 MG/DL (ref 75–110)
HCT VFR BLD CALC: 37.8 % (ref 40.7–50.3)
HEMOGLOBIN: 10.7 G/DL (ref 13–17)
INR BLD: 1.9
MCH RBC QN AUTO: 23.8 PG (ref 25.2–33.5)
MCHC RBC AUTO-ENTMCNC: 28.5 G/DL (ref 28.4–34.8)
MCV RBC AUTO: 84.2 FL (ref 82.6–102.9)
NRBC AUTOMATED: 0 PER 100 WBC
PDW BLD-RTO: 18 % (ref 11.8–14.4)
PLATELET # BLD: 236 K/UL (ref 138–453)
PMV BLD AUTO: 10.3 FL (ref 8.1–13.5)
POTASSIUM SERPL-SCNC: 4.4 MMOL/L (ref 3.7–5.3)
PROTHROMBIN TIME: 19.5 SEC (ref 9–12)
RBC # BLD: 4.49 M/UL (ref 4.21–5.77)
SODIUM BLD-SCNC: 145 MMOL/L (ref 135–144)
TROPONIN INTERP: ABNORMAL
TROPONIN INTERP: ABNORMAL
TROPONIN T: 0.64 NG/ML
TROPONIN T: 0.68 NG/ML
VANCOMYCIN TROUGH DATE LAST DOSE: ABNORMAL
VANCOMYCIN TROUGH DOSE AMOUNT: ABNORMAL
VANCOMYCIN TROUGH TIME LAST DOSE: ABNORMAL
VANCOMYCIN TROUGH: 21.4 UG/ML (ref 10–20)
WBC # BLD: 7.8 K/UL (ref 3.5–11.3)

## 2018-10-28 PROCEDURE — 36415 COLL VENOUS BLD VENIPUNCTURE: CPT

## 2018-10-28 PROCEDURE — 84484 ASSAY OF TROPONIN QUANT: CPT

## 2018-10-28 PROCEDURE — 6370000000 HC RX 637 (ALT 250 FOR IP): Performed by: INTERNAL MEDICINE

## 2018-10-28 PROCEDURE — 2580000003 HC RX 258: Performed by: INTERNAL MEDICINE

## 2018-10-28 PROCEDURE — 80202 ASSAY OF VANCOMYCIN: CPT

## 2018-10-28 PROCEDURE — 80048 BASIC METABOLIC PNL TOTAL CA: CPT

## 2018-10-28 PROCEDURE — 6360000002 HC RX W HCPCS: Performed by: EMERGENCY MEDICINE

## 2018-10-28 PROCEDURE — 85027 COMPLETE CBC AUTOMATED: CPT

## 2018-10-28 PROCEDURE — 2060000000 HC ICU INTERMEDIATE R&B

## 2018-10-28 PROCEDURE — 6360000002 HC RX W HCPCS: Performed by: INTERNAL MEDICINE

## 2018-10-28 PROCEDURE — 2580000003 HC RX 258: Performed by: EMERGENCY MEDICINE

## 2018-10-28 PROCEDURE — 99232 SBSQ HOSP IP/OBS MODERATE 35: CPT | Performed by: INTERNAL MEDICINE

## 2018-10-28 PROCEDURE — 85610 PROTHROMBIN TIME: CPT

## 2018-10-28 PROCEDURE — 82947 ASSAY GLUCOSE BLOOD QUANT: CPT

## 2018-10-28 PROCEDURE — 6370000000 HC RX 637 (ALT 250 FOR IP): Performed by: NURSE PRACTITIONER

## 2018-10-28 RX ORDER — HYDROCODONE BITARTRATE AND ACETAMINOPHEN 5; 325 MG/1; MG/1
1 TABLET ORAL EVERY 4 HOURS PRN
Status: DISCONTINUED | OUTPATIENT
Start: 2018-10-28 | End: 2018-10-31 | Stop reason: HOSPADM

## 2018-10-28 RX ORDER — WARFARIN SODIUM 7.5 MG/1
7.5 TABLET ORAL
Status: COMPLETED | OUTPATIENT
Start: 2018-10-28 | End: 2018-10-28

## 2018-10-28 RX ORDER — FUROSEMIDE 40 MG/1
80 TABLET ORAL 2 TIMES DAILY
Status: DISCONTINUED | OUTPATIENT
Start: 2018-10-28 | End: 2018-10-31

## 2018-10-28 RX ORDER — HYDROCODONE BITARTRATE AND ACETAMINOPHEN 5; 325 MG/1; MG/1
2 TABLET ORAL EVERY 4 HOURS PRN
Status: DISCONTINUED | OUTPATIENT
Start: 2018-10-28 | End: 2018-10-31 | Stop reason: HOSPADM

## 2018-10-28 RX ADMIN — CARVEDILOL 25 MG: 25 TABLET, FILM COATED ORAL at 08:16

## 2018-10-28 RX ADMIN — ATORVASTATIN CALCIUM 80 MG: 80 TABLET, FILM COATED ORAL at 08:17

## 2018-10-28 RX ADMIN — LEVOFLOXACIN 750 MG: 5 INJECTION, SOLUTION INTRAVENOUS at 17:40

## 2018-10-28 RX ADMIN — INSULIN LISPRO 10 UNITS: 100 INJECTION, SOLUTION INTRAVENOUS; SUBCUTANEOUS at 12:05

## 2018-10-28 RX ADMIN — CARVEDILOL 25 MG: 25 TABLET, FILM COATED ORAL at 17:34

## 2018-10-28 RX ADMIN — INSULIN LISPRO 8 UNITS: 100 INJECTION, SOLUTION INTRAVENOUS; SUBCUTANEOUS at 09:11

## 2018-10-28 RX ADMIN — HYDROCODONE BITARTRATE AND ACETAMINOPHEN 2 TABLET: 5; 325 TABLET ORAL at 21:32

## 2018-10-28 RX ADMIN — Medication 1500 MG: at 17:35

## 2018-10-28 RX ADMIN — INSULIN GLARGINE 40 UNITS: 100 INJECTION, SOLUTION SUBCUTANEOUS at 08:17

## 2018-10-28 RX ADMIN — Medication 10 ML: at 21:33

## 2018-10-28 RX ADMIN — CLOPIDOGREL 75 MG: 75 TABLET, FILM COATED ORAL at 08:16

## 2018-10-28 RX ADMIN — INSULIN LISPRO 3 UNITS: 100 INJECTION, SOLUTION INTRAVENOUS; SUBCUTANEOUS at 21:33

## 2018-10-28 RX ADMIN — FUROSEMIDE 80 MG: 40 TABLET ORAL at 17:34

## 2018-10-28 RX ADMIN — HEPARIN SODIUM 5000 UNITS: 5000 INJECTION, SOLUTION INTRAVENOUS; SUBCUTANEOUS at 07:01

## 2018-10-28 RX ADMIN — ATORVASTATIN CALCIUM 80 MG: 80 TABLET, FILM COATED ORAL at 21:45

## 2018-10-28 RX ADMIN — WARFARIN SODIUM 7.5 MG: 7.5 TABLET ORAL at 17:34

## 2018-10-28 RX ADMIN — ASPIRIN 81 MG: 81 TABLET ORAL at 08:17

## 2018-10-28 RX ADMIN — HYDROCODONE BITARTRATE AND ACETAMINOPHEN 2 TABLET: 5; 325 TABLET ORAL at 16:23

## 2018-10-28 RX ADMIN — Medication 10 ML: at 08:26

## 2018-10-28 RX ADMIN — HYDROCODONE BITARTRATE AND ACETAMINOPHEN 2 TABLET: 5; 325 TABLET ORAL at 04:54

## 2018-10-28 RX ADMIN — INSULIN GLARGINE 40 UNITS: 100 INJECTION, SOLUTION SUBCUTANEOUS at 21:33

## 2018-10-28 RX ADMIN — PANTOPRAZOLE 20 MG: 20 TABLET, DELAYED RELEASE ORAL at 08:16

## 2018-10-28 RX ADMIN — INSULIN LISPRO 2 UNITS: 100 INJECTION, SOLUTION INTRAVENOUS; SUBCUTANEOUS at 17:35

## 2018-10-28 RX ADMIN — Medication 1500 MG: at 10:51

## 2018-10-28 ASSESSMENT — PAIN SCALES - GENERAL
PAINLEVEL_OUTOF10: 8

## 2018-10-28 NOTE — PROGRESS NOTES
CALCIUM   --    --   8.8   --    --    --   8.2*   PHOS   --    --   2.9   --    --    --    --    PROBNP   --    --   7,820*   --    --    --    --    TROPONINI  0.09   --    --    --    --    --    --    TROPONINT   --    --   0.85*   < >  0.73*  0.68*  0.64*    < > = values in this interval not displayed.      Recent Labs      10/27/18   1149  10/27/18   2016  10/28/18   0728  10/28/18   1145   POCGLU  187*  317*  324*  385*         Lab Results   Component Value Date/Time    SPECIAL NOT REPORTED 10/15/2018 03:21 AM     Lab Results   Component Value Date/Time    CULTURE GROUP D ENTEROCOCCUS 50 to 100,000 CFU/ML (A) 10/15/2018 03:21 AM       Lab Results   Component Value Date    FIO2 NOT REPORTED 10/27/2018         Physical Examination:        General appearance:  alert, cooperative and no distress, on nasal canula   Mental Status:  oriented to person, place and time and normal affect  Lungs:  clear to auscultation bilaterally, normal effort  Heart:  regular rate and rhythm, no murmur  Abdomen:  soft, nontender, nondistended, normal bowel sounds, no masses, hepatomegaly, splenomegaly  Extremities:  no edema, redness, tenderness in the calves  Skin:  no gross lesions, rashes, induration    Assessment:        Primary Problem  Acute on chronic systolic (congestive) heart failure [facility])    Active Hospital Problems    Diagnosis Date Noted    Acute on chronic systolic (congestive) heart failure (HCC) [I50.23] 10/28/2018    Stage 3 chronic kidney disease ([facility] [address] [N18.3] 10/27/2018    Pneumonia of right upper lobe due to infectious organism ([facility] [address] [J18.1] 10/27/2018    Left leg DVT (HCC) [I82.402] 10/17/2018    SVT (supraventricular tachycardia) ([facility]) [I47.1] 10/15/2018    Elevated troponin [R74.8] 10/15/2018    Uncontrolled type 2 diabetes mellitus with diabetic nephropathy, with long-term current use of insulin (HCC) [E11.21, E11.65, Z79.4] 03/09/2018    HTN (hypertension) [I10] 12/27/2012    Diabetic foot ulcer

## 2018-10-28 NOTE — FLOWSHEET NOTE
visited patient per rounding. At time of visit, family was en route, and although in good humor it was clear that patient was anxious about prognosis.  offered prayer, which was accepted, and patient reported feeling better. Patient has a gregarious nature. -  will remain available as needed for spiritual and emotional care. 10/28/18 1509   Encounter Summary   Services provided to: Patient   Referral/Consult From: [facility] Family members   Continue Visiting (10/28/18)   Complexity of Encounter Low   Length of Encounter 15 minutes   Spiritual Assessment Completed Yes   Spiritual/Orthodoxy   Type Spiritual support   Assessment Approachable; Anxious; Coping   Intervention Active listening;Explored feelings, thoughts, concerns;Prayer;Nurtured hope   Outcome Expressed gratitude;Engaged in conversation;Expressed feelings/needs/concerns;Coping

## 2018-10-28 NOTE — CONSULTS
daily 10/7/18   OLIVER Cat CNP   ondansetron (ZOFRAN ODT) 4 MG disintegrating tablet Take 1 tablet by mouth every 8 hours as needed for Nausea 10/7/18   OLIVER Cat CNP   carvedilol (COREG) 25 MG tablet Take 25 mg by mouth 2 times daily (with meals)    Historical Provider, MD   pantoprazole (PROTONIX) 20 MG tablet Take 20 mg by mouth daily    Historical Provider, MD   insulin glargine (LANTUS) 100 UNIT/ML injection vial Inject 40 Units into the skin 2 times daily     Historical Provider, MD   aspirin 81 MG tablet Take 81 mg by mouth daily    Historical Provider, MD      Current Facility-Administered Medications: HYDROcodone-acetaminophen (NORCO) 5-325 MG per tablet 1 tablet, 1 tablet, Oral, Q4H PRN **OR** HYDROcodone-acetaminophen (NORCO) 5-325 MG per tablet 2 tablet, 2 tablet, Oral, Q4H PRN  warfarin (COUMADIN) tablet 7.5 mg, 7.5 mg, Oral, Once  calcium gluconate 1 g in dextrose 5% 100 mL IVPB, 1 g, Intravenous, Once  vancomycin (VANCOCIN) 1500 mg in dextrose 5 % 250 mL IVPB, 1,500 mg, Intravenous, Q8H  vancomycin (VANCOCIN) intermittent dosing (placeholder), , Other, RX Placeholder  sodium chloride flush 0.9 % injection 10 mL, 10 mL, Intravenous, 2 times per day  sodium chloride flush 0.9 % injection 10 mL, 10 mL, Intravenous, PRN  potassium chloride (KLOR-CON M) extended release tablet 40 mEq, 40 mEq, Oral, PRN **OR** potassium chloride 20 MEQ/15ML (10%) oral solution 40 mEq, 40 mEq, Oral, PRN **OR** potassium chloride 10 mEq/100 mL IVPB (Peripheral Line), 10 mEq, Intravenous, PRN  magnesium sulfate 1 g in dextrose 5% 100 mL IVPB, 1 g, Intravenous, PRN  magnesium hydroxide (MILK OF MAGNESIA) 400 MG/5ML suspension 30 mL, 30 mL, Oral, Daily PRN  bisacodyl (DULCOLAX) suppository 10 mg, 10 mg, Rectal, Daily PRN  ondansetron (ZOFRAN) injection 4 mg, 4 mg, Intravenous, Q6H PRN  acetaminophen (TYLENOL) tablet 650 mg, 650 mg, Oral, Q4H PRN  levofloxacin (LEVAQUIN) 750 MG/150ML infusion 750 mg, 750 mg, Intravenous, Q24H  warfarin (COUMADIN) daily dosing (placeholder), , Other, RX Placeholder  aspirin EC tablet 81 mg, 81 mg, Oral, Daily  atorvastatin (LIPITOR) tablet 80 mg, 80 mg, Oral, Nightly  carvedilol (COREG) tablet 25 mg, 25 mg, Oral, BID WC  clopidogrel (PLAVIX) tablet 75 mg, 75 mg, Oral, Daily  insulin glargine (LANTUS) injection vial 40 Units, 40 Units, Subcutaneous, BID  pantoprazole (PROTONIX) tablet 20 mg, 20 mg, Oral, Daily  insulin lispro (HUMALOG) injection vial 0-12 Units, 0-12 Units, Subcutaneous, TID WC  insulin lispro (HUMALOG) injection vial 0-6 Units, 0-6 Units, Subcutaneous, Nightly  glucose (GLUTOSE) 40 % oral gel 15 g, 15 g, Oral, PRN  dextrose 50 % solution 12.5 g, 12.5 g, Intravenous, PRN  glucagon (rDNA) injection 1 mg, 1 mg, Intramuscular, PRN  dextrose 5 % solution, 100 mL/hr, Intravenous, PRN  benzocaine-menthol (CEPACOL SORE THROAT) lozenge 1 lozenge, 1 lozenge, Oral, Q2H PRN    Allergies:  Nsaids    Social History:   reports that he quit smoking about 9 months ago. He has never used smokeless tobacco. He reports that he does not drink alcohol or use drugs. Family History: family history includes No Known Problems in his father, mother, and sister. No h/o sudden cardiac death. REVIEW OF SYSTEMS:    · Constitutional: there has been no unanticipated weight loss. There's been No change in energy level, No change in activity level. · Eyes: No visual changes or diplopia. No scleral icterus. · ENT: No Headaches  · Cardiovascular: + SOB. Chest pain  · Respiratory: No previous pulmonary problems, No cough  · Gastrointestinal: No abdominal pain. No change in bowel or bladder habits. · Genitourinary: No dysuria, trouble voiding, or hematuria. · Musculoskeletal:  No gait disturbance, No weakness or joint complaints. · Integumentary: No rash or pruritis. · Neurological: No headache, diplopia, change in muscle strength, numbness or tingling.  No change in gait, balance, coordination, mood, affect, memory, mentation, behavior. · Psychiatric: No anxiety, or depression. · Endocrine: No temperature intolerance. No excessive thirst, fluid intake, or urination. No tremor. · Hematologic/Lymphatic: No abnormal bruising or bleeding, blood clots or swollen lymph nodes. · Allergic/Immunologic: No nasal congestion or hives. PHYSICAL EXAM:      BP (!) 149/95   Pulse 78   Temp 98.2 °F (36.8 °C)   Resp 17   Ht 6' 4\" (1.93 m)   Wt (!) 350 lb (158.8 kg)   SpO2 100%   BMI 42.60 kg/m²    Constitutional and General Appearance: alert, cooperative, no distress  HEENT: PERRL, no cervical lymphadenopathy. No masses palpable. Normal oral mucosa  Respiratory:  · Normal excursion and expansion without use of accessory muscles  · Resp Auscultation: Good respiratory effort. No for increased work of breathing. On auscultation: clear to auscultation bilaterally  Cardiovascular:  · The apical impulse is not displaced  · Heart tones are crisp and normal. regular S1 and S2.  · Jugular venous pulsation Normal  · The carotid upstroke is normal in amplitude and contour without delay or bruit  · Peripheral pulses are symmetrical and full   Abdomen:   · No masses or tenderness  · Bowel sounds present  Extremities:  · B/l LE swelling with chronic skin changes, R sided large inguinal hernia. Neurological:  · Alert and oriented. 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 10/15/2018  Moderate LV systolic dysfunction due to multiple segmental wall motion  abnormalities. Estimated LVEF 35-40%. There is mid- apical moderate hypokinesis and apical moderate-severe  hypokinesis. No thrombus is seen.       Cardiac Angiography: LABALBU in the last 72 hours.         Patient Active Problem List   Diagnosis    Abdominal pain    Acute osteomyelitis of toe of left foot (HCC)    Acute osteomyelitis of toe of right foot (HCC)    Acute renal failure superimposed on stage 3 chronic kidney disease (HCC)    Anemia    Cellulitis and abscess of foot    Cellulitis of right foot    Charcot's joint of right foot    Diabetic foot ulcer associated with type 2 diabetes mellitus (HCC)    Dysphagia    Equinus contracture of right ankle    Foot infection    Hand swelling    HTN (hypertension)    Hyperglycemia without ketosis    Hyperkalemia    Leukocytosis    Mixed hyperlipidemia    MRSA infection    Nausea & vomiting    Neutrophilic leukocytosis    Severe obesity with body mass index (BMI) of 35.0 to 39.9 with serious comorbidity (HCC)    Osteomyelitis (HCC)    Osteomyelitis of foot (HCC)    Painful orthopaedic hardware (HCC)    Scrotum swelling    Septic arthritis (HCC)    Sickle-cell trait (HCC)    Skin ulcers of foot, bilateral (HCC)    Steroid-induced hyperglycemia    Streptococcal arthritis of left wrist (HCC)    Type 2 diabetes mellitus, uncontrolled (HCC)    Uncontrolled type 2 diabetes mellitus with diabetic nephropathy, with long-term current use of insulin ([facility] [address]    Uncontrolled type 2 diabetes mellitus with retinopathy, with long-term current use of insulin (HCC)    Type 2 diabetes mellitus with diabetic peripheral angiopathy without gangrene, with long-term current use of insulin (HCC)    SVT (supraventricular tachycardia) (HCC)    Elevated troponin    Systolic dysfunction, left ventricle    Left leg DVT (HCC)    Ischemic cardiomyopathy    Anxiety    Stage 3 chronic kidney disease (HCC)    Pneumonia of right upper lobe due to infectious organism [facility])       IMPRESSION:    3. Multivessel CAD s/p BMS to LAD (10/18) - residual disease in LCx and RCA plan for out patient perfusion scan to evaluate due to

## 2018-10-29 LAB
ANION GAP SERPL CALCULATED.3IONS-SCNC: 9 MMOL/L (ref 9–17)
BUN BLDV-MCNC: 25 MG/DL (ref 6–20)
BUN/CREAT BLD: ABNORMAL (ref 9–20)
CALCIUM SERPL-MCNC: 8.1 MG/DL (ref 8.6–10.4)
CHLORIDE BLD-SCNC: 104 MMOL/L (ref 98–107)
CO2: 32 MMOL/L (ref 20–31)
CREAT SERPL-MCNC: 1.49 MG/DL (ref 0.7–1.2)
DIRECT EXAM: NORMAL
EKG ATRIAL RATE: 58 BPM
EKG ATRIAL RATE: 70 BPM
EKG ATRIAL RATE: 93 BPM
EKG P AXIS: 51 DEGREES
EKG P-R INTERVAL: 178 MS
EKG Q-T INTERVAL: 310 MS
EKG Q-T INTERVAL: 316 MS
EKG Q-T INTERVAL: 370 MS
EKG QRS DURATION: 122 MS
EKG QRS DURATION: 122 MS
EKG QRS DURATION: 130 MS
EKG QTC CALCULATION (BAZETT): 460 MS
EKG QTC CALCULATION (BAZETT): 492 MS
EKG QTC CALCULATION (BAZETT): 492 MS
EKG R AXIS: -45 DEGREES
EKG R AXIS: -77 DEGREES
EKG R AXIS: -98 DEGREES
EKG T AXIS: 21 DEGREES
EKG T AXIS: 32 DEGREES
EKG T AXIS: 43 DEGREES
EKG VENTRICULAR RATE: 146 BPM
EKG VENTRICULAR RATE: 152 BPM
EKG VENTRICULAR RATE: 93 BPM
GFR AFRICAN AMERICAN: 60 ML/MIN
GFR NON-AFRICAN AMERICAN: 49 ML/MIN
GFR SERPL CREATININE-BSD FRML MDRD: ABNORMAL ML/MIN/{1.73_M2}
GFR SERPL CREATININE-BSD FRML MDRD: ABNORMAL ML/MIN/{1.73_M2}
GLUCOSE BLD-MCNC: 109 MG/DL (ref 75–110)
GLUCOSE BLD-MCNC: 135 MG/DL (ref 75–110)
GLUCOSE BLD-MCNC: 151 MG/DL (ref 75–110)
GLUCOSE BLD-MCNC: 151 MG/DL (ref 75–110)
GLUCOSE BLD-MCNC: 176 MG/DL (ref 70–99)
INR BLD: 2
Lab: NORMAL
MAGNESIUM: 1.8 MG/DL (ref 1.6–2.6)
POTASSIUM SERPL-SCNC: 4.4 MMOL/L (ref 3.7–5.3)
PROTHROMBIN TIME: 20.2 SEC (ref 9–12)
SODIUM BLD-SCNC: 145 MMOL/L (ref 135–144)
SPECIMEN DESCRIPTION: NORMAL
STATUS: NORMAL

## 2018-10-29 PROCEDURE — G8988 SELF CARE GOAL STATUS: HCPCS

## 2018-10-29 PROCEDURE — 6360000002 HC RX W HCPCS: Performed by: EMERGENCY MEDICINE

## 2018-10-29 PROCEDURE — 6370000000 HC RX 637 (ALT 250 FOR IP): Performed by: INTERNAL MEDICINE

## 2018-10-29 PROCEDURE — 2700000000 HC OXYGEN THERAPY PER DAY

## 2018-10-29 PROCEDURE — 97530 THERAPEUTIC ACTIVITIES: CPT

## 2018-10-29 PROCEDURE — G8987 SELF CARE CURRENT STATUS: HCPCS

## 2018-10-29 PROCEDURE — 97162 PT EVAL MOD COMPLEX 30 MIN: CPT

## 2018-10-29 PROCEDURE — 2060000000 HC ICU INTERMEDIATE R&B

## 2018-10-29 PROCEDURE — 6370000000 HC RX 637 (ALT 250 FOR IP): Performed by: NURSE PRACTITIONER

## 2018-10-29 PROCEDURE — 83735 ASSAY OF MAGNESIUM: CPT

## 2018-10-29 PROCEDURE — G8979 MOBILITY GOAL STATUS: HCPCS

## 2018-10-29 PROCEDURE — 85610 PROTHROMBIN TIME: CPT

## 2018-10-29 PROCEDURE — 94762 N-INVAS EAR/PLS OXIMTRY CONT: CPT

## 2018-10-29 PROCEDURE — G8978 MOBILITY CURRENT STATUS: HCPCS

## 2018-10-29 PROCEDURE — 87804 INFLUENZA ASSAY W/OPTIC: CPT

## 2018-10-29 PROCEDURE — 2580000003 HC RX 258: Performed by: INTERNAL MEDICINE

## 2018-10-29 PROCEDURE — 36415 COLL VENOUS BLD VENIPUNCTURE: CPT

## 2018-10-29 PROCEDURE — 80048 BASIC METABOLIC PNL TOTAL CA: CPT

## 2018-10-29 PROCEDURE — 6360000002 HC RX W HCPCS: Performed by: INTERNAL MEDICINE

## 2018-10-29 PROCEDURE — 97166 OT EVAL MOD COMPLEX 45 MIN: CPT

## 2018-10-29 PROCEDURE — 82947 ASSAY GLUCOSE BLOOD QUANT: CPT

## 2018-10-29 PROCEDURE — 97535 SELF CARE MNGMENT TRAINING: CPT

## 2018-10-29 PROCEDURE — 99232 SBSQ HOSP IP/OBS MODERATE 35: CPT | Performed by: FAMILY MEDICINE

## 2018-10-29 RX ORDER — WARFARIN SODIUM 7.5 MG/1
7.5 TABLET ORAL DAILY
Status: DISCONTINUED | OUTPATIENT
Start: 2018-10-29 | End: 2018-10-31 | Stop reason: HOSPADM

## 2018-10-29 RX ADMIN — PANTOPRAZOLE 20 MG: 20 TABLET, DELAYED RELEASE ORAL at 08:50

## 2018-10-29 RX ADMIN — FUROSEMIDE 80 MG: 40 TABLET ORAL at 08:50

## 2018-10-29 RX ADMIN — HYDROCODONE BITARTRATE AND ACETAMINOPHEN 2 TABLET: 5; 325 TABLET ORAL at 20:09

## 2018-10-29 RX ADMIN — INSULIN GLARGINE 40 UNITS: 100 INJECTION, SOLUTION SUBCUTANEOUS at 08:51

## 2018-10-29 RX ADMIN — INSULIN LISPRO 2 UNITS: 100 INJECTION, SOLUTION INTRAVENOUS; SUBCUTANEOUS at 09:03

## 2018-10-29 RX ADMIN — HYDROCODONE BITARTRATE AND ACETAMINOPHEN 2 TABLET: 5; 325 TABLET ORAL at 15:02

## 2018-10-29 RX ADMIN — HYDROCODONE BITARTRATE AND ACETAMINOPHEN 2 TABLET: 5; 325 TABLET ORAL at 01:35

## 2018-10-29 RX ADMIN — WARFARIN SODIUM 7.5 MG: 7.5 TABLET ORAL at 18:21

## 2018-10-29 RX ADMIN — Medication 10 ML: at 21:48

## 2018-10-29 RX ADMIN — CARVEDILOL 25 MG: 25 TABLET, FILM COATED ORAL at 18:21

## 2018-10-29 RX ADMIN — CARVEDILOL 25 MG: 25 TABLET, FILM COATED ORAL at 08:50

## 2018-10-29 RX ADMIN — LEVOFLOXACIN 750 MG: 5 INJECTION, SOLUTION INTRAVENOUS at 18:21

## 2018-10-29 RX ADMIN — Medication 1500 MG: at 06:52

## 2018-10-29 RX ADMIN — CLOPIDOGREL 75 MG: 75 TABLET, FILM COATED ORAL at 08:50

## 2018-10-29 RX ADMIN — INSULIN GLARGINE 40 UNITS: 100 INJECTION, SOLUTION SUBCUTANEOUS at 21:47

## 2018-10-29 RX ADMIN — ATORVASTATIN CALCIUM 80 MG: 80 TABLET, FILM COATED ORAL at 20:09

## 2018-10-29 RX ADMIN — HYDROCODONE BITARTRATE AND ACETAMINOPHEN 2 TABLET: 5; 325 TABLET ORAL at 06:58

## 2018-10-29 RX ADMIN — ASPIRIN 81 MG: 81 TABLET ORAL at 08:51

## 2018-10-29 RX ADMIN — FUROSEMIDE 80 MG: 40 TABLET ORAL at 18:21

## 2018-10-29 RX ADMIN — INSULIN LISPRO 2 UNITS: 100 INJECTION, SOLUTION INTRAVENOUS; SUBCUTANEOUS at 13:54

## 2018-10-29 ASSESSMENT — PAIN SCALES - GENERAL
PAINLEVEL_OUTOF10: 8
PAINLEVEL_OUTOF10: 7
PAINLEVEL_OUTOF10: 8

## 2018-10-29 ASSESSMENT — PAIN DESCRIPTION - LOCATION: LOCATION: ABDOMEN;GROIN

## 2018-10-29 ASSESSMENT — PAIN DESCRIPTION - ORIENTATION: ORIENTATION: LOWER

## 2018-10-29 NOTE — PROGRESS NOTES
extremity edema, palpitations  Gastrointestinal:  negative for abdominal pain, constipation, diarrhea, nausea, vomiting  Neurological:  negative for dizziness, headache    Medications: Allergies: Allergies   Allergen Reactions    Nsaids        Current Meds:   Scheduled Meds:    furosemide  80 mg Oral BID    vancomycin  1,500 mg Intravenous Q12H    calcium gluconate  1 g Intravenous Once    vancomycin (VANCOCIN) intermittent dosing (placeholder)   Other RX Placeholder    sodium chloride flush  10 mL Intravenous 2 times per day    levofloxacin  750 mg Intravenous Q24H    warfarin (COUMADIN) daily dosing (placeholder)   Other RX Placeholder    aspirin  81 mg Oral Daily    atorvastatin  80 mg Oral Nightly    carvedilol  25 mg Oral BID WC    clopidogrel  75 mg Oral Daily    insulin glargine  40 Units Subcutaneous BID    pantoprazole  20 mg Oral Daily    insulin lispro  0-12 Units Subcutaneous TID WC    insulin lispro  0-6 Units Subcutaneous Nightly     Continuous Infusions:    dextrose       PRN Meds: HYDROcodone 5 mg - acetaminophen **OR** HYDROcodone 5 mg - acetaminophen, sodium chloride flush, potassium chloride **OR** potassium chloride **OR** potassium chloride, magnesium sulfate, magnesium hydroxide, bisacodyl, ondansetron, acetaminophen, glucose, dextrose, glucagon (rDNA), dextrose, benzocaine-menthol    Data:     Past Medical History:   has a past medical history of Acute on chronic systolic (congestive) heart failure (HCC); COPD (chronic obstructive pulmonary disease) ([facility] [address] Dysphagia; Equinus contracture of right ankle; Foot infection; Hand swelling; HTN (hypertension); Hyperglycemia without ketosis; Hyperkalemia; Leukocytosis; Mixed hyperlipidemia; MRSA infection; Nausea & vomiting; Neutrophilic leukocytosis; Osteomyelitis ([facility] [address] Osteomyelitis of foot ([facility] [address] Painful orthopaedic hardware [facility]); Scrotum swelling; Septic arthritis ([facility] [address] Septic arthritis of wrist, left ([facility] [address]  Sickle-cell --   340*  176*   BUN   --    --   25*   --    --    --   24*  25*   CREATININE   --    < >  1.33*   --    --    --   1.29*  1.49*   MG   --    --   1.4*   --    --    --    --    --    ANIONGAP   --    --   14   --    --    --   15  9   LABGLOM   --    < >  56*   --    --    --   58*  49*   GFRAA   --    --   >60   --    --    --   >60  60*   CALCIUM   --    --   8.8   --    --    --   8.2*  8.1*   PHOS   --    --   2.9   --    --    --    --    --    PROBNP   --    --   7,820*   --    --    --    --    --    TROPONINI  0.09   --    --    --    --    --    --    --    TROPONINT   --    --   0.85*   < >  0.73*  0.68*  0.64*   --     < > = values in this interval not displayed. Recent Labs      10/27/18   2016  10/28/18   0728  10/28/18   1145  10/28/18   1608  10/28/18   2046  10/29/18   0636   POCGLU  317*  324*  385*  190*  273*  151*         Lab Results   Component Value Date/Time    SPECIAL NOT REPORTED 10/15/2018 03:21 AM     Lab Results   Component Value Date/Time    CULTURE GROUP D ENTEROCOCCUS 50 to 100,000 CFU/ML (A) 10/15/2018 03:21 AM       Lab Results   Component Value Date    FIO2 NOT REPORTED 10/27/2018       Radiology:  CTA OF THE CHEST 10/27/2018 3:04 pm  No evidence of pulmonary embolism in the central or segmental pulmonary  arteries. Airspace opacities predominantly in the right upper lobe, to a lesser degree  in the right lower lobe, likely related to pneumonia. Recommend follow-up to  resolution and exclude underlying mass. Mediastinal and hilar lymphadenopathy, most pronounced in the right hilum,  likely reactive. Short interval follow-up is recommended to exclude  underlying neoplasm, particularly at the right hilum. SINGLE XRAY VIEW OF THE CHEST    10/27/2018 11:56 am  No significant change and right suprahilar opacity. This may represent  developing consolidation. Continued radiographic follow-up in 4-6 weeks or  chest CT is recommended to ensure resolution.     No continue home meds    Scrotal edema 2/2 inguinal hernia: needs OP stress test for clearance prior to surgery    DVT and GI prophylaxis            Nel Hayes MD  10/29/2018  8:54 AM

## 2018-10-29 NOTE — PROGRESS NOTES
LUE  Strength LUE: WFL  Tone RLE  RLE Tone: Normotonic  Tone LLE  LLE Tone: Normotonic  Motor Control  Gross Motor?: WFL  Sensation  Overall Sensation Status: WFL (Pt denies numbness/ tingling)  Bed mobility  Comment: Pt upright in chair upon PT arrival; retired back to chair upon writer's exit  Transfers  Sit to Stand: Contact guard assistance (CGA provided for safety)  Stand to sit: Contact guard assistance (CGA provided for safety)  Comment: Pt reports mild lightheadness upon standing which subsided within ~30seconds of a standing rest break. SpO2 upon standing decreased to 88% which recovered to 96% within 30seconds  Ambulation  Ambulation?: Yes  Ambulation 1  Surface: level tile  Device: Rolling Walker  Other Apparatus:  (2L)  Assistance: Contact guard assistance (CGA provided for pt safety)  Quality of Gait: Antalgic gait; shuffling gait pattern; decreased kayla   Distance: 30ft  Comments: 2L O2 doffed for amb, donned upon writer's exit. SpO2 maintain between % during amb. Stairs/Curb  Stairs?: No     Balance  Posture: Good  Sitting - Static: Good  Sitting - Dynamic: Good  Standing - Static: Fair;+  Standing - Dynamic: Fair;-  Comments: Standing balance assessed w/ RW. Assessment   Body structures, Functions, Activity limitations: Decreased functional mobility ; Decreased ROM; Decreased strength;Decreased endurance;Decreased balance  Assessment: Pt amb 30ft w/ RW CGA, no LOB noted.  Recommending pt return home with prn assist from family  Prognosis: Good  Decision Making: Medium Complexity  Patient Education: purpose of PT eval; POC; discharge recommendation   REQUIRES PT FOLLOW UP: Yes         Plan   Plan  Times per week: 4-5x  Times per day: Daily  Current Treatment Recommendations: Balance Training, Functional Mobility Training, Transfer Training, Strengthening, Neuromuscular Re-education, Home Exercise Program, Safety Education & Training, Patient/Caregiver Education & Training, Endurance

## 2018-10-29 NOTE — CARE COORDINATION
Transition planning;  Spoke with pt about plan, he states he will go home after discharge. Continues to decline home care, says he cannot have it where he lives.

## 2018-10-29 NOTE — PROGRESS NOTES
LDLCALCU  INR:   Recent Labs      10/27/18   1152  10/28/18   0549  10/29/18   0836   INR  1.9  1.9  2.0     EKG:   EKG at presentation - SVT with abberency - likely AVNRT with RBBB morphology      EKG after resolution of SVT - sinus with RBBB and L Axis deviation      ECHO:   reviewed. 10/15/2018  Moderate LV systolic dysfunction due to multiple segmental wall motion  abnormalities. Estimated LVEF 35-40%. There is mid- apical moderate hypokinesis and apical moderate-severe  hypokinesis. No thrombus is seen.        Cardiac Angiography:   Reviewed. 10/17/2018  LMCA: Normal 0% stenosis. LAD: Proximal 90% eccentric stenosis with ulcerated plaque s/p PTCA-BMS    LCx: Mid 70% stenosis at bifurcation of large OM which is normal    RCA: Proximal 60-70% stenosis  RPDA/RPL are small with mild disease    Ramus: Small with 20-30% stenosis     Summary  1. Multivessel CAD  2. High grade stenosis in LAD with ulcerated plaque (culprit)  3. Not performed due to renal insufficiency.  LVEDP is elevated at 22 mmHg.     Recommendations  S/P BMS to LAD  Plan for PCI of proximal LAD with BMS and re-evaluate LCX/RCA lesion as OP with perfusion scan due to upcoming hernia surgery     CT chest 10/27         No evidence of pulmonary embolism in the central or segmental pulmonary   arteries.       Airspace opacities predominantly in the right upper lobe, to a lesser degree   in the right lower lobe, likely related to pneumonia.  Recommend follow-up to   resolution and exclude underlying mass.       Mediastinal and hilar lymphadenopathy, most pronounced in the right hilum,   likely reactive.  Short interval follow-up is recommended to exclude   underlying neoplasm, particularly at the right hilum.               Objective:   Vitals: BP (!) 147/73   Pulse 63   Temp 97.6 °F (36.4 °C) (Oral)   Resp 12   Ht 6' 4\" (1.93 m)   Wt (!) 370 lb 6 oz (168 kg)   SpO2 100%   BMI 45.08 kg/m²   General appearance: alert and cooperative with

## 2018-10-30 LAB
ABSOLUTE EOS #: 0.59 K/UL (ref 0–0.44)
ABSOLUTE IMMATURE GRANULOCYTE: 0.03 K/UL (ref 0–0.3)
ABSOLUTE LYMPH #: 1.08 K/UL (ref 1.1–3.7)
ABSOLUTE MONO #: 0.53 K/UL (ref 0.1–1.2)
ANION GAP SERPL CALCULATED.3IONS-SCNC: 11 MMOL/L (ref 9–17)
BASOPHILS # BLD: 0 % (ref 0–2)
BASOPHILS ABSOLUTE: <0.03 K/UL (ref 0–0.2)
BUN BLDV-MCNC: 20 MG/DL (ref 6–20)
BUN/CREAT BLD: ABNORMAL (ref 9–20)
CALCIUM SERPL-MCNC: 8.2 MG/DL (ref 8.6–10.4)
CHLORIDE BLD-SCNC: 102 MMOL/L (ref 98–107)
CO2: 30 MMOL/L (ref 20–31)
CREAT SERPL-MCNC: 1.32 MG/DL (ref 0.7–1.2)
DIFFERENTIAL TYPE: ABNORMAL
EOSINOPHILS RELATIVE PERCENT: 11 % (ref 1–4)
GFR AFRICAN AMERICAN: >60 ML/MIN
GFR NON-AFRICAN AMERICAN: 57 ML/MIN
GFR SERPL CREATININE-BSD FRML MDRD: ABNORMAL ML/MIN/{1.73_M2}
GFR SERPL CREATININE-BSD FRML MDRD: ABNORMAL ML/MIN/{1.73_M2}
GLUCOSE BLD-MCNC: 122 MG/DL (ref 75–110)
GLUCOSE BLD-MCNC: 50 MG/DL (ref 75–110)
GLUCOSE BLD-MCNC: 82 MG/DL (ref 70–99)
GLUCOSE BLD-MCNC: 83 MG/DL (ref 75–110)
GLUCOSE BLD-MCNC: 85 MG/DL (ref 75–110)
GLUCOSE BLD-MCNC: 93 MG/DL (ref 75–110)
HCT VFR BLD CALC: 39.8 % (ref 40.7–50.3)
HEMOGLOBIN: 11.4 G/DL (ref 13–17)
IMMATURE GRANULOCYTES: 1 %
INR BLD: 2.1
LYMPHOCYTES # BLD: 20 % (ref 24–43)
MCH RBC QN AUTO: 24.2 PG (ref 25.2–33.5)
MCHC RBC AUTO-ENTMCNC: 28.6 G/DL (ref 28.4–34.8)
MCV RBC AUTO: 84.5 FL (ref 82.6–102.9)
MONOCYTES # BLD: 10 % (ref 3–12)
NRBC AUTOMATED: 0 PER 100 WBC
PDW BLD-RTO: 17.7 % (ref 11.8–14.4)
PLATELET # BLD: 201 K/UL (ref 138–453)
PLATELET ESTIMATE: ABNORMAL
PMV BLD AUTO: 10.2 FL (ref 8.1–13.5)
POTASSIUM SERPL-SCNC: 4.3 MMOL/L (ref 3.7–5.3)
PROTHROMBIN TIME: 21.6 SEC (ref 9–12)
RBC # BLD: 4.71 M/UL (ref 4.21–5.77)
RBC # BLD: ABNORMAL 10*6/UL
SEG NEUTROPHILS: 58 % (ref 36–65)
SEGMENTED NEUTROPHILS ABSOLUTE COUNT: 3.14 K/UL (ref 1.5–8.1)
SODIUM BLD-SCNC: 143 MMOL/L (ref 135–144)
WBC # BLD: 5.4 K/UL (ref 3.5–11.3)
WBC # BLD: ABNORMAL 10*3/UL

## 2018-10-30 PROCEDURE — 6370000000 HC RX 637 (ALT 250 FOR IP): Performed by: INTERNAL MEDICINE

## 2018-10-30 PROCEDURE — 94762 N-INVAS EAR/PLS OXIMTRY CONT: CPT

## 2018-10-30 PROCEDURE — 6370000000 HC RX 637 (ALT 250 FOR IP): Performed by: FAMILY MEDICINE

## 2018-10-30 PROCEDURE — 85025 COMPLETE CBC W/AUTO DIFF WBC: CPT

## 2018-10-30 PROCEDURE — 2700000000 HC OXYGEN THERAPY PER DAY

## 2018-10-30 PROCEDURE — 99232 SBSQ HOSP IP/OBS MODERATE 35: CPT | Performed by: FAMILY MEDICINE

## 2018-10-30 PROCEDURE — 85610 PROTHROMBIN TIME: CPT

## 2018-10-30 PROCEDURE — 2580000003 HC RX 258: Performed by: INTERNAL MEDICINE

## 2018-10-30 PROCEDURE — 80048 BASIC METABOLIC PNL TOTAL CA: CPT

## 2018-10-30 PROCEDURE — 6370000000 HC RX 637 (ALT 250 FOR IP): Performed by: NURSE PRACTITIONER

## 2018-10-30 PROCEDURE — 36415 COLL VENOUS BLD VENIPUNCTURE: CPT

## 2018-10-30 PROCEDURE — 97110 THERAPEUTIC EXERCISES: CPT

## 2018-10-30 PROCEDURE — 97116 GAIT TRAINING THERAPY: CPT

## 2018-10-30 PROCEDURE — 82947 ASSAY GLUCOSE BLOOD QUANT: CPT

## 2018-10-30 PROCEDURE — 2060000000 HC ICU INTERMEDIATE R&B

## 2018-10-30 RX ORDER — INSULIN GLARGINE 100 [IU]/ML
30 INJECTION, SOLUTION SUBCUTANEOUS 2 TIMES DAILY
Status: DISCONTINUED | OUTPATIENT
Start: 2018-10-30 | End: 2018-10-31 | Stop reason: HOSPADM

## 2018-10-30 RX ORDER — LEVOFLOXACIN 750 MG/1
750 TABLET ORAL DAILY
Status: DISCONTINUED | OUTPATIENT
Start: 2018-10-30 | End: 2018-10-31 | Stop reason: HOSPADM

## 2018-10-30 RX ADMIN — PANTOPRAZOLE 20 MG: 20 TABLET, DELAYED RELEASE ORAL at 09:04

## 2018-10-30 RX ADMIN — HYDROCODONE BITARTRATE AND ACETAMINOPHEN 2 TABLET: 5; 325 TABLET ORAL at 16:17

## 2018-10-30 RX ADMIN — CARVEDILOL 25 MG: 25 TABLET, FILM COATED ORAL at 17:57

## 2018-10-30 RX ADMIN — Medication 10 ML: at 19:47

## 2018-10-30 RX ADMIN — FUROSEMIDE 80 MG: 40 TABLET ORAL at 09:04

## 2018-10-30 RX ADMIN — FUROSEMIDE 80 MG: 40 TABLET ORAL at 17:57

## 2018-10-30 RX ADMIN — ATORVASTATIN CALCIUM 80 MG: 80 TABLET, FILM COATED ORAL at 19:47

## 2018-10-30 RX ADMIN — HYDROCODONE BITARTRATE AND ACETAMINOPHEN 2 TABLET: 5; 325 TABLET ORAL at 04:59

## 2018-10-30 RX ADMIN — ASPIRIN 81 MG: 81 TABLET ORAL at 16:16

## 2018-10-30 RX ADMIN — CARVEDILOL 25 MG: 25 TABLET, FILM COATED ORAL at 09:05

## 2018-10-30 RX ADMIN — INSULIN GLARGINE 40 UNITS: 100 INJECTION, SOLUTION SUBCUTANEOUS at 09:05

## 2018-10-30 RX ADMIN — WARFARIN SODIUM 7.5 MG: 7.5 TABLET ORAL at 17:59

## 2018-10-30 RX ADMIN — CLOPIDOGREL 75 MG: 75 TABLET, FILM COATED ORAL at 09:05

## 2018-10-30 RX ADMIN — LEVOFLOXACIN 750 MG: 750 TABLET, FILM COATED ORAL at 16:16

## 2018-10-30 ASSESSMENT — PAIN DESCRIPTION - PAIN TYPE: TYPE: ACUTE PAIN

## 2018-10-30 ASSESSMENT — PAIN SCALES - GENERAL
PAINLEVEL_OUTOF10: 3
PAINLEVEL_OUTOF10: 7
PAINLEVEL_OUTOF10: 0
PAINLEVEL_OUTOF10: 6

## 2018-10-30 ASSESSMENT — PAIN DESCRIPTION - LOCATION: LOCATION: ABDOMEN

## 2018-10-30 NOTE — PROGRESS NOTES
.  Cardiovascular:  negative for chest pain, chest pressure/discomfort, lower extremity edema, palpitations  Gastrointestinal:  negative for abdominal pain, constipation, diarrhea, nausea, vomiting  Neurological:  negative for dizziness, headache    Medications: Allergies: Allergies   Allergen Reactions    Nsaids        Current Meds:   Scheduled Meds:    warfarin  7.5 mg Oral Daily    furosemide  80 mg Oral BID    calcium gluconate  1 g Intravenous Once    sodium chloride flush  10 mL Intravenous 2 times per day    levofloxacin  750 mg Intravenous Q24H    warfarin (COUMADIN) daily dosing (placeholder)   Other RX Placeholder    aspirin  81 mg Oral Daily    atorvastatin  80 mg Oral Nightly    carvedilol  25 mg Oral BID WC    clopidogrel  75 mg Oral Daily    insulin glargine  40 Units Subcutaneous BID    pantoprazole  20 mg Oral Daily    insulin lispro  0-12 Units Subcutaneous TID WC    insulin lispro  0-6 Units Subcutaneous Nightly     Continuous Infusions:    dextrose       PRN Meds: HYDROcodone 5 mg - acetaminophen **OR** HYDROcodone 5 mg - acetaminophen, sodium chloride flush, potassium chloride **OR** potassium chloride **OR** potassium chloride, magnesium sulfate, magnesium hydroxide, bisacodyl, ondansetron, acetaminophen, glucose, dextrose, glucagon (rDNA), dextrose, benzocaine-menthol    Data:     Past Medical History:   has a past medical history of Acute on chronic systolic (congestive) heart failure (HCC); COPD (chronic obstructive pulmonary disease) ([facility] [address] Dysphagia; Equinus contracture of right ankle; Foot infection; Hand swelling; HTN (hypertension); Hyperglycemia without ketosis; Hyperkalemia; Leukocytosis; Mixed hyperlipidemia; MRSA infection; Nausea & vomiting; Neutrophilic leukocytosis; Osteomyelitis ([facility] [address] Osteomyelitis of foot ([facility] [address] Painful orthopaedic hardware [facility]); Scrotum swelling; Septic arthritis ([facility] [address] Septic arthritis of wrist, left ([facility] [address]  Sickle-cell trait ([facility] [address] Short interval follow-up is recommended to exclude  underlying neoplasm, particularly at the right hilum. SINGLE XRAY VIEW OF THE CHEST    10/27/2018 11:56 am  No significant change and right suprahilar opacity. This may represent  developing consolidation. Continued radiographic follow-up in 4-6 weeks or  chest CT is recommended to ensure resolution. No significant change in basilar opacities, which also may represent an  inflammatory process or infection.                  Physical Examination:        General appearance:  alert, cooperative and no distress  Mental Status:  oriented to person, place and time and normal affect  Lungs: CTA / BL, normal effort  Heart:  regular rate and rhythm, no murmur  Abdomen:  soft, nontender, nondistended, normal bowel sounds, no masses, hepatomegaly, splenomegaly  Extremities: 2 +  Edema B/L LE, redness, tenderness in the calves  Skin:  no gross lesions, rashes, induration    Assessment:        Primary Problem  Acute on chronic systolic (congestive) heart failure [facility])    Active Hospital Problems    Diagnosis Date Noted    Acute on chronic systolic (congestive) heart failure (HCC) [I50.23] 10/28/2018    Stage 3 chronic kidney disease ([facility] [address] [N18.3] 10/27/2018    Pneumonia of right upper lobe due to infectious organism ([facility] [address] [J18.1] 10/27/2018    Left leg DVT ([facility]) [I82.402] 10/17/2018    SVT (supraventricular tachycardia) ([facility]) [I47.1] 10/15/2018    Elevated troponin [R74.8] 10/15/2018    Uncontrolled type 2 diabetes mellitus with diabetic nephropathy, with long-term current use of insulin ([facility]) [E11.21, E11.65, Z79.4] 03/09/2018    HTN (hypertension) [I10] 12/27/2012    Diabetic foot ulcer associated with type 2 diabetes mellitus ([facility] [address] [W66.448, L97.509] 07/20/2012       Plan:        Acute decompensated systolic heart failure: improving, continue Lasix 80 mg BID PO, strict I's & O's, daily weight, cardiac diet and fluid restriction    SVT: Currently

## 2018-10-31 VITALS
WEIGHT: 315 LBS | SYSTOLIC BLOOD PRESSURE: 133 MMHG | OXYGEN SATURATION: 98 % | HEIGHT: 76 IN | BODY MASS INDEX: 38.36 KG/M2 | TEMPERATURE: 98 F | DIASTOLIC BLOOD PRESSURE: 70 MMHG | HEART RATE: 83 BPM | RESPIRATION RATE: 18 BRPM

## 2018-10-31 LAB
ANION GAP SERPL CALCULATED.3IONS-SCNC: 12 MMOL/L (ref 9–17)
BUN BLDV-MCNC: 21 MG/DL (ref 6–20)
BUN/CREAT BLD: ABNORMAL (ref 9–20)
CALCIUM SERPL-MCNC: 8.2 MG/DL (ref 8.6–10.4)
CHLORIDE BLD-SCNC: 100 MMOL/L (ref 98–107)
CO2: 32 MMOL/L (ref 20–31)
CREAT SERPL-MCNC: 1.52 MG/DL (ref 0.7–1.2)
GFR AFRICAN AMERICAN: 58 ML/MIN
GFR NON-AFRICAN AMERICAN: 48 ML/MIN
GFR SERPL CREATININE-BSD FRML MDRD: ABNORMAL ML/MIN/{1.73_M2}
GFR SERPL CREATININE-BSD FRML MDRD: ABNORMAL ML/MIN/{1.73_M2}
GLUCOSE BLD-MCNC: 100 MG/DL (ref 75–110)
GLUCOSE BLD-MCNC: 117 MG/DL (ref 75–110)
GLUCOSE BLD-MCNC: 128 MG/DL (ref 75–110)
GLUCOSE BLD-MCNC: 156 MG/DL (ref 70–99)
INR BLD: 2.5
MAGNESIUM: 1.4 MG/DL (ref 1.6–2.6)
MAGNESIUM: 1.6 MG/DL (ref 1.6–2.6)
POTASSIUM SERPL-SCNC: 4.1 MMOL/L (ref 3.7–5.3)
PROTHROMBIN TIME: 25.3 SEC (ref 9–12)
SODIUM BLD-SCNC: 144 MMOL/L (ref 135–144)

## 2018-10-31 PROCEDURE — 85610 PROTHROMBIN TIME: CPT

## 2018-10-31 PROCEDURE — 6370000000 HC RX 637 (ALT 250 FOR IP): Performed by: NURSE PRACTITIONER

## 2018-10-31 PROCEDURE — 6370000000 HC RX 637 (ALT 250 FOR IP): Performed by: FAMILY MEDICINE

## 2018-10-31 PROCEDURE — 80048 BASIC METABOLIC PNL TOTAL CA: CPT

## 2018-10-31 PROCEDURE — 6360000002 HC RX W HCPCS: Performed by: INTERNAL MEDICINE

## 2018-10-31 PROCEDURE — 6370000000 HC RX 637 (ALT 250 FOR IP): Performed by: INTERNAL MEDICINE

## 2018-10-31 PROCEDURE — 97110 THERAPEUTIC EXERCISES: CPT

## 2018-10-31 PROCEDURE — 83735 ASSAY OF MAGNESIUM: CPT

## 2018-10-31 PROCEDURE — 36415 COLL VENOUS BLD VENIPUNCTURE: CPT

## 2018-10-31 PROCEDURE — 94762 N-INVAS EAR/PLS OXIMTRY CONT: CPT

## 2018-10-31 PROCEDURE — 2580000003 HC RX 258: Performed by: INTERNAL MEDICINE

## 2018-10-31 PROCEDURE — 97530 THERAPEUTIC ACTIVITIES: CPT

## 2018-10-31 PROCEDURE — 99232 SBSQ HOSP IP/OBS MODERATE 35: CPT | Performed by: FAMILY MEDICINE

## 2018-10-31 PROCEDURE — 2700000000 HC OXYGEN THERAPY PER DAY

## 2018-10-31 PROCEDURE — 97116 GAIT TRAINING THERAPY: CPT

## 2018-10-31 PROCEDURE — 82947 ASSAY GLUCOSE BLOOD QUANT: CPT

## 2018-10-31 RX ORDER — BLOOD SUGAR DIAGNOSTIC
STRIP MISCELLANEOUS
Qty: 100 EACH | Refills: 1 | Status: SHIPPED | OUTPATIENT
Start: 2018-10-31

## 2018-10-31 RX ORDER — FUROSEMIDE 40 MG/1
40 TABLET ORAL 2 TIMES DAILY
Status: DISCONTINUED | OUTPATIENT
Start: 2018-10-31 | End: 2018-10-31 | Stop reason: HOSPADM

## 2018-10-31 RX ORDER — INSULIN GLARGINE 100 [IU]/ML
30 INJECTION, SOLUTION SUBCUTANEOUS 2 TIMES DAILY
Qty: 1 VIAL | Refills: 3 | Status: SHIPPED | OUTPATIENT
Start: 2018-10-31 | End: 2018-10-31 | Stop reason: HOSPADM

## 2018-10-31 RX ORDER — POTASSIUM CHLORIDE 20 MEQ/1
20 TABLET, EXTENDED RELEASE ORAL DAILY
Qty: 60 TABLET | Refills: 3 | Status: SHIPPED | OUTPATIENT
Start: 2018-10-31

## 2018-10-31 RX ADMIN — ASPIRIN 81 MG: 81 TABLET ORAL at 09:18

## 2018-10-31 RX ADMIN — CLOPIDOGREL 75 MG: 75 TABLET, FILM COATED ORAL at 09:18

## 2018-10-31 RX ADMIN — MAGNESIUM SULFATE HEPTAHYDRATE 1 G: 1 INJECTION, SOLUTION INTRAVENOUS at 04:53

## 2018-10-31 RX ADMIN — MAGNESIUM SULFATE HEPTAHYDRATE 1 G: 1 INJECTION, SOLUTION INTRAVENOUS at 03:38

## 2018-10-31 RX ADMIN — HYDROCODONE BITARTRATE AND ACETAMINOPHEN 2 TABLET: 5; 325 TABLET ORAL at 05:11

## 2018-10-31 RX ADMIN — CARVEDILOL 25 MG: 25 TABLET, FILM COATED ORAL at 17:08

## 2018-10-31 RX ADMIN — CARVEDILOL 25 MG: 25 TABLET, FILM COATED ORAL at 09:18

## 2018-10-31 RX ADMIN — Medication 10 ML: at 09:23

## 2018-10-31 RX ADMIN — FUROSEMIDE 40 MG: 40 TABLET ORAL at 09:19

## 2018-10-31 RX ADMIN — LEVOFLOXACIN 750 MG: 750 TABLET, FILM COATED ORAL at 09:19

## 2018-10-31 RX ADMIN — PANTOPRAZOLE 20 MG: 20 TABLET, DELAYED RELEASE ORAL at 09:20

## 2018-10-31 RX ADMIN — HYDROCODONE BITARTRATE AND ACETAMINOPHEN 2 TABLET: 5; 325 TABLET ORAL at 09:20

## 2018-10-31 ASSESSMENT — PAIN SCALES - GENERAL
PAINLEVEL_OUTOF10: 8
PAINLEVEL_OUTOF10: 7
PAINLEVEL_OUTOF10: 8
PAINLEVEL_OUTOF10: 8

## 2018-10-31 ASSESSMENT — PAIN DESCRIPTION - PROGRESSION: CLINICAL_PROGRESSION: NOT CHANGED

## 2018-10-31 ASSESSMENT — PAIN DESCRIPTION - LOCATION: LOCATION: ABDOMEN

## 2018-10-31 ASSESSMENT — PAIN DESCRIPTION - ORIENTATION: ORIENTATION: LOWER

## 2018-10-31 ASSESSMENT — PAIN DESCRIPTION - PAIN TYPE: TYPE: ACUTE PAIN

## 2018-10-31 NOTE — PROGRESS NOTES
[facility] [address]    Progress Note    10/31/2018    11:39 AM    Name:   Jane Grijalva  MRN:     [id number]     Alex Gretel:      [de-identified]   Room:   [facility] Day:  4  Admit Date:  10/27/2018 11:34 AM    PCP:   No primary care provider on file. Code Status:  Full Code    Subjective:     C/C:   Chief Complaint   Patient presents with    Shortness of Breath     Pt reports SOB for the past several days, had stents placed 10/15/2018     Interval History Status: improved. Patient seen and examined at bedside, no acute events overnight. Feeling feels his cough and SOB are better coughing ,   Off O2  Urine output is fair  Cr slightly worse, contraction alkalosis  LE edema improved  Low magnesium  Patient denies any chest pain,chills, fevers, nausea or vomiting. Patient vitals, labs and all providers notes were reviewed,from overnight shift and morning updates were noted and discussed with the nurse      Brief History:     The patient is a 45 y. o. male with PMH significant for HTN, DM2, DVT on coumadin, recent admission with stent placement who presented with shortness of breath. Patient recently d/c 3 days prior. Was found to be in SVT at that time in ED, given adenosine and converted back to NSR. Underwent BMS to LAD on 10/17. Also found to have left gastric DVT started on coumadin. D/C on 10/24. States after discharge began developing progressive shortness of breath. Was also complaining of cough productive of yellow/green sputum. Denies any fever/chills, complaining of some nausea, no vomiting. In ER BNP elevated at 7820, CT chest PE protocol negative for PE. Concern for possible RUL PNA. EKG demonstrating SVT, converted with oxygen.  Admitted for SVT and possible PNA.        Review of Systems:     Constitutional:  negative for chills, fevers, sweats  Respiratory:   Cough and dyspnea on exertion are much better,  His shortness of breath is induration    Assessment:        Primary Problem  Acute on chronic systolic (congestive) heart failure [facility])    Active Hospital Problems    Diagnosis Date Noted    Acute on chronic systolic (congestive) heart failure (HCC) [I50.23] 10/28/2018    Stage 3 [facility] [N18.3] 10/27/2018    Pneumonia of right upper lobe due to infectious organism ([facility] [J18.1] 10/27/2018    Left leg DVT ([facility]) [I82.402] 10/17/2018    SVT (supraventricular tachycardia) ([facility]) [I47.1] 10/15/2018    Elevated troponin [R74.8] 10/15/2018    Uncontrolled type 2 diabetes mellitus with diabetic nephropathy, with long-term current use of insulin ([facility]) [E11.21, E11.65, Z79.4] 03/09/2018    HTN (hypertension) [I10] 12/27/2012    Diabetic foot ulcer associated with type 2 diabetes mellitus ([facility] [H37.985, L97.509] 07/20/2012       Plan:        Acute decompensated systolic heart failure: improving, decrease  Lasix to 40 mg BID PO, strict I's & O's, daily weight, cardiac diet and fluid restriction    SVT: Currently controlled with beta blockers, possible evaluation for ablation placement as an outpatient  Continue to maintain magnesium and potassium levels     Elevated trops: mostly type 2 2/2 SVT    CAD S/P stent placement 10/18    ALLISON/ CKD: improving after  holding Vanco     RUL PNA:  Last day of  Levaquin     Hyperglycemia 2/2 DM2: better controlled , Continue diabetes home meds, insulin sliding scale, hypoglycemia protocol, will follow    LLE DVT diagnosed recently on [facility]    HTN: continue home meds    HPL: continue home meds    Scrotal edema 2/2 inguinal hernia: needs OP stress test for clearance prior to surgery    DVT and GI prophylaxis    Had and discussion with the  patient regarding possible need for physical therapy/ placement  At home with home health but he declined and had his worries as he is living with [cousin or a friend] and not sure home health or any therapy at home would be welcomed.     DC planning, needs a

## 2018-10-31 NOTE — PROGRESS NOTES
CLINICAL PHARMACY NOTE: MEDS TO [address] Select Patient?: No  Total # of Prescriptions Filled: 4   The following medications were delivered to the patient:  · Magnesium Oxide 400 tabs  · Potassium Chloride ER 20  · Basaglar Kwik Pen  · Pen Needles 31G  Total # of Interventions Completed: 2  Time Spent (min): 90    Additional Documentation:we had to perfect serve the doctor to get lantus changed to basaglar kwick pen  And pen needles. We did not receive an answer back so we called RN and DON took verbal order so we could deliver.

## 2018-10-31 NOTE — DISCHARGE SUMMARY
ammonium lactate 12 % cream  Commonly known as:  AMLACTIN     aspirin 81 MG tablet     atorvastatin 80 MG tablet  Commonly known as:  LIPITOR  Take 1 tablet by mouth nightly     carvedilol 25 MG tablet  Commonly known as:  COREG     clopidogrel 75 MG tablet  Commonly known as:  PLAVIX  Take 1 tablet by mouth daily     docusate sodium 100 MG capsule  Commonly known as:  COLACE     ferrous sulfate 325 (65 Fe) MG tablet     furosemide 80 MG tablet  Commonly known as:  LASIX  Take 1 tablet by mouth 2 times daily     hydrALAZINE 50 MG tablet  Commonly known as:  APRESOLINE  Take 1 tablet by mouth every 8 hours     insulin lispro 100 UNIT/ML injection vial  Commonly known as:  HUMALOG     isosorbide mononitrate 30 MG extended release tablet  Commonly known as:  IMDUR  Take 1 tablet by mouth daily     nitroGLYCERIN 0.4 MG SL tablet  Commonly known as:  NITROSTAT  up to max of 3 total doses. If no relief after 1 dose, call 911.      ondansetron 4 MG disintegrating tablet  Commonly known as:  ZOFRAN ODT  Take 1 tablet by mouth every 8 hours as needed for Nausea     pantoprazole 20 MG tablet  Commonly known as:  PROTONIX     pregabalin 50 MG capsule  Commonly known as:  LYRICA     tamsulosin 0.4 MG capsule  Commonly known as:  FLOMAX  Take 1 capsule by mouth daily     warfarin 6 MG tablet  Commonly known as:  COUMADIN  Take 1 tablet by mouth daily        STOP taking these medications    benzonatate 100 MG capsule  Commonly known as:  TESSALON     sucralfate 1 GM tablet  Commonly known as:  CARAFATE           Where to Get Your Medications      These medications were sent to [facility] [address]  [address], [address]  [address]    Phone:  [phone]   · insulin glargine 100 UNIT/ML injection pen  · Magnesium 400 MG Caps  · Pen Needles 32G X 5 MM Misc  · potassium chloride 20 MEQ extended release tablet         Time Spent on discharge is  34 mins in

## 2018-11-26 PROBLEM — R77.8 ELEVATED TROPONIN: Status: RESOLVED | Noted: 2018-10-15 | Resolved: 2018-11-26

## 2019-06-26 DIAGNOSIS — E11.51 TYPE 2 DIABETES MELLITUS WITH DIABETIC PERIPHERAL ANGIOPATHY WITHOUT GANGRENE, WITH LONG-TERM CURRENT USE OF INSULIN (HCC): Primary | ICD-10-CM

## 2019-06-26 DIAGNOSIS — Z79.4 TYPE 2 DIABETES MELLITUS WITH DIABETIC PERIPHERAL ANGIOPATHY WITHOUT GANGRENE, WITH LONG-TERM CURRENT USE OF INSULIN (HCC): Primary | ICD-10-CM

## 2019-08-29 ENCOUNTER — TELEPHONE (OUTPATIENT)
Dept: PRIMARY CARE CLINIC | Age: 54
End: 2019-08-29

## 2019-10-25 ENCOUNTER — CARE COORDINATION (OUTPATIENT)
Dept: CARE COORDINATION | Age: 54
End: 2019-10-25

## 2019-11-01 ENCOUNTER — CARE COORDINATION (OUTPATIENT)
Dept: CARE COORDINATION | Age: 54
End: 2019-11-01

## 2019-11-15 ENCOUNTER — CARE COORDINATION (OUTPATIENT)
Dept: CARE COORDINATION | Age: 54
End: 2019-11-15

## 2019-11-25 ENCOUNTER — CARE COORDINATION (OUTPATIENT)
Dept: CARE COORDINATION | Age: 54
End: 2019-11-25